# Patient Record
Sex: FEMALE | Race: WHITE | Employment: FULL TIME | URBAN - METROPOLITAN AREA
[De-identification: names, ages, dates, MRNs, and addresses within clinical notes are randomized per-mention and may not be internally consistent; named-entity substitution may affect disease eponyms.]

---

## 2017-08-30 PROBLEM — I35.8 AORTIC VALVE SCLEROSIS: Status: ACTIVE | Noted: 2017-08-30

## 2018-02-19 ENCOUNTER — TELEPHONE (OUTPATIENT)
Dept: CARDIOLOGY UNIT | Facility: HOSPITAL | Age: 63
End: 2018-02-19

## 2018-02-19 ENCOUNTER — HOSPITAL ENCOUNTER (OUTPATIENT)
Facility: HOSPITAL | Age: 63
Setting detail: OBSERVATION
Discharge: HOME | End: 2018-02-19
Attending: EMERGENCY MEDICINE | Admitting: HOSPITALIST
Payer: COMMERCIAL

## 2018-02-19 ENCOUNTER — APPOINTMENT (OUTPATIENT)
Dept: CV DIAGNOSTICS | Facility: HOSPITAL | Age: 63
End: 2018-02-19
Attending: HOSPITALIST
Payer: COMMERCIAL

## 2018-02-19 ENCOUNTER — APPOINTMENT (OUTPATIENT)
Dept: GENERAL RADIOLOGY | Facility: HOSPITAL | Age: 63
End: 2018-02-19
Attending: EMERGENCY MEDICINE
Payer: COMMERCIAL

## 2018-02-19 VITALS
WEIGHT: 189 LBS | OXYGEN SATURATION: 98 % | DIASTOLIC BLOOD PRESSURE: 85 MMHG | RESPIRATION RATE: 18 BRPM | TEMPERATURE: 98 F | HEART RATE: 67 BPM | HEIGHT: 63 IN | SYSTOLIC BLOOD PRESSURE: 143 MMHG | BODY MASS INDEX: 33.49 KG/M2

## 2018-02-19 DIAGNOSIS — R07.9 ACUTE CHEST PAIN: Primary | ICD-10-CM

## 2018-02-19 LAB
ALBUMIN SERPL-MCNC: 3.4 G/DL (ref 3.5–4.8)
ALP LIVER SERPL-CCNC: 88 U/L (ref 50–130)
ALT SERPL-CCNC: 24 U/L (ref 14–54)
AST SERPL-CCNC: 23 U/L (ref 15–41)
ATRIAL RATE: 80 BPM
BASOPHILS # BLD AUTO: 0.03 X10(3) UL (ref 0–0.1)
BASOPHILS NFR BLD AUTO: 0.4 %
BILIRUB SERPL-MCNC: 0.6 MG/DL (ref 0.1–2)
BUN BLD-MCNC: 16 MG/DL (ref 8–20)
CALCIUM BLD-MCNC: 8.9 MG/DL (ref 8.3–10.3)
CHLORIDE: 107 MMOL/L (ref 101–111)
CO2: 26 MMOL/L (ref 22–32)
CREAT BLD-MCNC: 0.98 MG/DL (ref 0.55–1.02)
D-DIMER: 0.29 UG/ML FEU (ref 0–0.49)
EOSINOPHIL # BLD AUTO: 0.25 X10(3) UL (ref 0–0.3)
EOSINOPHIL NFR BLD AUTO: 3.2 %
ERYTHROCYTE [DISTWIDTH] IN BLOOD BY AUTOMATED COUNT: 12.8 % (ref 11.5–16)
GLUCOSE BLD-MCNC: 112 MG/DL (ref 70–99)
HCT VFR BLD AUTO: 37.5 % (ref 34–50)
HGB BLD-MCNC: 12.7 G/DL (ref 12–16)
IMMATURE GRANULOCYTE COUNT: 0.02 X10(3) UL (ref 0–1)
IMMATURE GRANULOCYTE RATIO %: 0.3 %
LYMPHOCYTES # BLD AUTO: 2.18 X10(3) UL (ref 0.9–4)
LYMPHOCYTES NFR BLD AUTO: 27.8 %
M PROTEIN MFR SERPL ELPH: 7.3 G/DL (ref 6.1–8.3)
MCH RBC QN AUTO: 30.8 PG (ref 27–33.2)
MCHC RBC AUTO-ENTMCNC: 33.9 G/DL (ref 31–37)
MCV RBC AUTO: 90.8 FL (ref 81–100)
MONOCYTES # BLD AUTO: 0.85 X10(3) UL (ref 0.1–1)
MONOCYTES NFR BLD AUTO: 10.8 %
NEUTROPHIL ABS PRELIM: 4.51 X10 (3) UL (ref 1.3–6.7)
NEUTROPHILS # BLD AUTO: 4.51 X10(3) UL (ref 1.3–6.7)
NEUTROPHILS NFR BLD AUTO: 57.5 %
P AXIS: 64 DEGREES
P-R INTERVAL: 168 MS
PLATELET # BLD AUTO: 249 10(3)UL (ref 150–450)
POTASSIUM SERPL-SCNC: 3.6 MMOL/L (ref 3.6–5.1)
Q-T INTERVAL: 404 MS
QRS DURATION: 82 MS
QTC CALCULATION (BEZET): 465 MS
R AXIS: 19 DEGREES
RBC # BLD AUTO: 4.13 X10(6)UL (ref 3.8–5.1)
RED CELL DISTRIBUTION WIDTH-SD: 42.7 FL (ref 35.1–46.3)
SODIUM SERPL-SCNC: 140 MMOL/L (ref 136–144)
T AXIS: 42 DEGREES
TROPONIN: <0.046 NG/ML (ref ?–0.05)
VENTRICULAR RATE: 80 BPM
WBC # BLD AUTO: 7.8 X10(3) UL (ref 4–13)

## 2018-02-19 PROCEDURE — 84484 ASSAY OF TROPONIN QUANT: CPT | Performed by: EMERGENCY MEDICINE

## 2018-02-19 PROCEDURE — 99285 EMERGENCY DEPT VISIT HI MDM: CPT

## 2018-02-19 PROCEDURE — 84484 ASSAY OF TROPONIN QUANT: CPT | Performed by: HOSPITALIST

## 2018-02-19 PROCEDURE — 93018 CV STRESS TEST I&R ONLY: CPT | Performed by: HOSPITALIST

## 2018-02-19 PROCEDURE — 93017 CV STRESS TEST TRACING ONLY: CPT | Performed by: HOSPITALIST

## 2018-02-19 PROCEDURE — 36415 COLL VENOUS BLD VENIPUNCTURE: CPT

## 2018-02-19 PROCEDURE — 78452 HT MUSCLE IMAGE SPECT MULT: CPT | Performed by: HOSPITALIST

## 2018-02-19 PROCEDURE — 93010 ELECTROCARDIOGRAM REPORT: CPT

## 2018-02-19 PROCEDURE — 71045 X-RAY EXAM CHEST 1 VIEW: CPT | Performed by: EMERGENCY MEDICINE

## 2018-02-19 PROCEDURE — 85025 COMPLETE CBC W/AUTO DIFF WBC: CPT | Performed by: EMERGENCY MEDICINE

## 2018-02-19 PROCEDURE — 80053 COMPREHEN METABOLIC PANEL: CPT | Performed by: EMERGENCY MEDICINE

## 2018-02-19 PROCEDURE — 93005 ELECTROCARDIOGRAM TRACING: CPT

## 2018-02-19 PROCEDURE — 85378 FIBRIN DEGRADE SEMIQUANT: CPT | Performed by: EMERGENCY MEDICINE

## 2018-02-19 RX ORDER — LOTEPREDNOL ETABONATE 5 MG/ML
1 SUSPENSION/ DROPS OPHTHALMIC DAILY
COMMUNITY
End: 2019-07-10

## 2018-02-19 RX ORDER — SODIUM CHLORIDE 9 MG/ML
INJECTION, SOLUTION INTRAVENOUS CONTINUOUS
Status: DISCONTINUED | OUTPATIENT
Start: 2018-02-19 | End: 2018-02-19

## 2018-02-19 RX ORDER — ASPIRIN 81 MG/1
324 TABLET, CHEWABLE ORAL ONCE
Status: COMPLETED | OUTPATIENT
Start: 2018-02-19 | End: 2018-02-19

## 2018-02-19 RX ORDER — ONDANSETRON 2 MG/ML
4 INJECTION INTRAMUSCULAR; INTRAVENOUS EVERY 6 HOURS PRN
Status: DISCONTINUED | OUTPATIENT
Start: 2018-02-19 | End: 2018-02-19

## 2018-02-19 RX ORDER — POTASSIUM CHLORIDE 20 MEQ/1
40 TABLET, EXTENDED RELEASE ORAL EVERY 4 HOURS
Status: COMPLETED | OUTPATIENT
Start: 2018-02-19 | End: 2018-02-19

## 2018-02-19 RX ORDER — ACETAMINOPHEN 325 MG/1
650 TABLET ORAL EVERY 6 HOURS PRN
Status: DISCONTINUED | OUTPATIENT
Start: 2018-02-19 | End: 2018-02-19

## 2018-02-19 RX ORDER — ENOXAPARIN SODIUM 100 MG/ML
40 INJECTION SUBCUTANEOUS DAILY
Status: DISCONTINUED | OUTPATIENT
Start: 2018-02-19 | End: 2018-02-19

## 2018-02-19 NOTE — CONSULTS
[facility] Group Cardiology  Consultation Note      Génesismeaghan Ulloa Patient Status:  Observation    1955 MRN [id number]   [facility] 2NE-A Attending Ximena Barrera MD   Hosp Day # 0 PCP Donovan Schroeder MD     Outpatient cardi consultation was requested.     Medications:    Current Facility-Administered Medications:  0.9%  NaCl infusion  Intravenous Continuous   Enoxaparin Sodium (LOVENOX) 40 MG/0.4ML injection 40 mg 40 mg Subcutaneous Daily   acetaminophen (TYLENOL) tab 650 mg 6 Nausea and vomiting      Review of Systems:  Constitutional: negative for fevers  Eyes: negative for visual disturbance  Ears, nose, mouth, throat, and face: negative for epistaxis  Respiratory: negative for dyspnea on exertion  Cardiovascular: negative fo Results  Component Value Date   WBC 7.8 02/19/2018   HGB 12.7 02/19/2018   HCT 37.5 02/19/2018   .0 02/19/2018   CREATSERUM 0.98 02/19/2018   BUN 16 02/19/2018    02/19/2018   K 3.6 02/19/2018    02/19/2018   CO2 26.0 02/19/2018   GLU 11

## 2018-02-19 NOTE — ED INITIAL ASSESSMENT (HPI)
Pt presents with dizziness that makes her think she's about to pass out. (Not a vertigo dizziness, she reports.) There's also an intermittent \"throbbing\" chest pain that began at 2330 tonight. Tenderness to palpation of her chest wall, the left side.

## 2018-02-19 NOTE — ED PROVIDER NOTES
Patient Seen in: [facility] Emergency Department    History   Patient presents with:  Dizziness (neurologic)  Pain (neurologic)    Stated Complaint: shoulder pain/dizzy    HPI    Patient is a 23-year-old female comes emergency room for evaluation of Types: Cigarettes     Quit date: 5/27/1974  Smokeless tobacco: Never Used                      Alcohol use: Yes           1.2 oz/week     Glasses of wine: 2 per week     Comment: occasional      Review of Systems    Positive for stated complaint: aguila Normal    Narrative:     FEU = Fibrinogen Equivalent Units.     D-Dimer results of less than 0.5 ug/mL (FEU) have been shown to contribute to the exclusion of venous thromboembolism with a negative predictive value of approximately 95% when results are used am    Follow-up:  No follow-up provider specified.       Medications Prescribed:  Current Discharge Medication List        Present on Admission  Date Reviewed: 8/30/2017          ICD-10-CM Noted POA    Acute chest pain R07.9 2/19/2018 Unknown

## 2018-02-19 NOTE — H&P
General Medicine H&P     Patient presents with:  Dizziness (neurologic)  Pain (neurologic)       PCP: Jany Vázquez MD    Attempted to see pt earlier, was at stress    History of Present Illness: Patient is a 60year old female with PMH sig for lyme, History   Problem Relation Age of Onset   • Heart Disorder Father    • Depression Sister    • Kidney Disease Sister    • Heart Disorder Maternal Grandfather    • Heart Disorder Paternal Grandfather    • Breast Cancer Paternal Aunt 79   • Dementia Mother PLAN:    60year old female with PMH sig for lyme, OA, PTSD, who p/t [facility] ED c cp.     CP  -may be MSK, tenderness to palpitation, possible pericarditis  +fam hx, stress test done, returned negative  -CMP, CBC, trop, d-dimer CXR all ok  -pt had near fainting

## 2018-02-19 NOTE — PAYOR COMM NOTE
--------------  ADMISSION REVIEW     Payor: [facility] PPO  Subscriber #:  [id number]  Authorization Number: N/A    Admit date: N/A  Admit time: N/A       Admitting Physician: Leora Pisano MD  Attending Physician:  Leora Pisano MD  Primary Care P Past Surgical History:  No date: CARPAL TUNNEL RELEASE  No date: FOOT SURGERY  No date: [facility] NEEDLE LOCALIZATION W/ SPECIMEN 1 SITE LEFT      Comment: 1990's bn  1994: OTHER  No date: OTHER SURGICAL HISTORY      Comment: A RCR RIGHT SHOULDER   No date groups.   normal sensation, speech intact    ED Course     Labs Reviewed   COMP METABOLIC PANEL (14) - Abnormal; Notable for the following:        Result Value    Glucose 112 (*)     Albumin 3.4 (*)     All other components within normal limits   TROPONIN I complaints or concerns. Patient will be admitted to the hospital for further workup. Admission disposition: 2/19/2018  4:37 AM           Disposition and Plan     Clinical Impression:  Acute chest pain  (primary encounter diagnosis)    Disposition:   Ad

## 2018-02-20 NOTE — PLAN OF CARE
NURSING DISCHARGE NOTE    Discharged to home. Accompanied by RN. Belongings sent. DC instructions given, pt verbalizes understanding.

## 2018-05-15 PROCEDURE — 87077 CULTURE AEROBIC IDENTIFY: CPT | Performed by: INTERNAL MEDICINE

## 2018-05-15 PROCEDURE — 87046 STOOL CULTR AEROBIC BACT EA: CPT | Performed by: INTERNAL MEDICINE

## 2018-05-15 PROCEDURE — 89055 LEUKOCYTE ASSESSMENT FECAL: CPT | Performed by: INTERNAL MEDICINE

## 2018-05-15 PROCEDURE — 87209 SMEAR COMPLEX STAIN: CPT | Performed by: INTERNAL MEDICINE

## 2018-05-15 PROCEDURE — 87045 FECES CULTURE AEROBIC BACT: CPT | Performed by: INTERNAL MEDICINE

## 2018-05-15 PROCEDURE — 87329 GIARDIA AG IA: CPT | Performed by: INTERNAL MEDICINE

## 2018-05-15 PROCEDURE — 87177 OVA AND PARASITES SMEARS: CPT | Performed by: INTERNAL MEDICINE

## 2018-05-15 PROCEDURE — 87493 C DIFF AMPLIFIED PROBE: CPT | Performed by: INTERNAL MEDICINE

## 2018-05-15 PROCEDURE — 87272 CRYPTOSPORIDIUM AG IF: CPT | Performed by: INTERNAL MEDICINE

## 2019-07-15 PROBLEM — M18.12 PRIMARY OSTEOARTHRITIS OF FIRST CARPOMETACARPAL JOINT OF LEFT HAND: Status: ACTIVE | Noted: 2019-07-15

## 2019-08-12 PROBLEM — M67.432 GANGLION CYST OF VOLAR ASPECT OF LEFT WRIST: Status: ACTIVE | Noted: 2019-08-12

## 2019-08-12 PROBLEM — M19.032 ARTHRITIS OF LEFT WRIST: Status: ACTIVE | Noted: 2019-08-12

## 2019-10-17 PROBLEM — R42 VERTIGO: Status: ACTIVE | Noted: 2019-10-17

## 2020-08-31 PROBLEM — M67.432 GANGLION CYST OF VOLAR ASPECT OF LEFT WRIST: Status: RESOLVED | Noted: 2019-08-12 | Resolved: 2020-08-31

## 2020-08-31 PROBLEM — M19.032 ARTHRITIS OF LEFT WRIST: Status: RESOLVED | Noted: 2019-08-12 | Resolved: 2020-08-31

## 2020-08-31 PROBLEM — M18.12 PRIMARY OSTEOARTHRITIS OF FIRST CARPOMETACARPAL JOINT OF LEFT HAND: Status: RESOLVED | Noted: 2019-07-15 | Resolved: 2020-08-31

## 2020-09-03 ENCOUNTER — OFFICE VISIT (OUTPATIENT)
Dept: OBGYN CLINIC | Facility: CLINIC | Age: 65
End: 2020-09-03
Payer: MEDICARE

## 2020-09-03 VITALS
BODY MASS INDEX: 33 KG/M2 | WEIGHT: 184 LBS | HEART RATE: 71 BPM | DIASTOLIC BLOOD PRESSURE: 89 MMHG | SYSTOLIC BLOOD PRESSURE: 149 MMHG

## 2020-09-03 DIAGNOSIS — N88.8 SINGLE NABOTHIAN CYST: Primary | ICD-10-CM

## 2020-09-03 PROCEDURE — 99203 OFFICE O/P NEW LOW 30 MIN: CPT | Performed by: OBSTETRICS & GYNECOLOGY

## 2020-09-03 RX ORDER — POLYETHYLENE GLYCOL-3350 AND ELECTROLYTES 236; 6.74; 5.86; 2.97; 22.74 G/274.31G; G/274.31G; G/274.31G; G/274.31G; G/274.31G
POWDER, FOR SOLUTION ORAL
COMMUNITY
Start: 2020-09-02 | End: 2020-10-08

## 2020-09-03 NOTE — PROGRESS NOTES
Farzana NASH 1955       Patient presents with:  Gyn Problem: large polyp not sure where. .pap was done on the 2020 neg hpv neg  pt saw her pcp for a recent pap and there was a polyp seen on her cervix.   Her cotest at that time was normal. problem.     Diagnoses and all orders for this visit:    Single nabothian cyst    rtc in 1 year for annual exam either with me or with her pcp

## 2020-09-10 PROBLEM — M85.80 OSTEOPENIA, UNSPECIFIED LOCATION: Status: ACTIVE | Noted: 2020-09-10

## 2020-09-10 PROBLEM — E78.00 HIGH CHOLESTEROL: Status: ACTIVE | Noted: 2020-09-10

## 2020-09-10 PROBLEM — Z01.411 ABNORMAL PELVIC EXAM: Status: ACTIVE | Noted: 2020-09-10

## 2020-09-10 PROBLEM — L65.9 HAIR LOSS: Status: ACTIVE | Noted: 2020-09-10

## 2020-09-29 PROBLEM — M65.341 TRIGGER RING FINGER OF RIGHT HAND: Status: ACTIVE | Noted: 2020-09-29

## 2020-11-13 PROBLEM — Z01.411 ABNORMAL PELVIC EXAM: Status: RESOLVED | Noted: 2020-09-10 | Resolved: 2020-11-13

## 2023-06-20 ENCOUNTER — TELEPHONE (OUTPATIENT)
Dept: GASTROENTEROLOGY | Facility: CLINIC | Age: 68
End: 2023-06-20

## 2023-06-20 ENCOUNTER — OFFICE VISIT (OUTPATIENT)
Dept: GASTROENTEROLOGY | Facility: CLINIC | Age: 68
End: 2023-06-20
Payer: MEDICARE

## 2023-06-20 VITALS
BODY MASS INDEX: 32.6 KG/M2 | HEART RATE: 62 BPM | SYSTOLIC BLOOD PRESSURE: 157 MMHG | WEIGHT: 184 LBS | DIASTOLIC BLOOD PRESSURE: 84 MMHG | HEIGHT: 63 IN

## 2023-06-20 DIAGNOSIS — R10.12 LUQ PAIN: Primary | ICD-10-CM

## 2023-06-20 DIAGNOSIS — Z12.11 COLON CANCER SCREENING: Primary | ICD-10-CM

## 2023-06-20 PROCEDURE — 99203 OFFICE O/P NEW LOW 30 MIN: CPT | Performed by: INTERNAL MEDICINE

## 2023-06-20 RX ORDER — LOTEPREDNOL ETABONATE 5 MG/ML
1 SUSPENSION/ DROPS OPHTHALMIC 4 TIMES DAILY
COMMUNITY
Start: 2023-06-06

## 2023-06-20 RX ORDER — APIXABAN 5 MG/1
5 TABLET, FILM COATED ORAL 2 TIMES DAILY
COMMUNITY
Start: 2023-05-23

## 2023-06-20 RX ORDER — METOPROLOL SUCCINATE 50 MG/1
50 TABLET, EXTENDED RELEASE ORAL 2 TIMES DAILY
Qty: 60 TABLET | Refills: 11 | COMMUNITY
Start: 2022-08-18 | End: 2023-07-19

## 2023-06-20 NOTE — PATIENT INSTRUCTIONS
Colonoscopy for colon cancer screening  - Golytely and MAC sedation at the hospital GI lab  - get cardiac clearance and ok for holding anticoag 2 days before procedure    LUQ pain  - call to set up CT scan

## 2023-06-26 NOTE — TELEPHONE ENCOUNTER
ReScheduled for:  Colonoscopy [id number] , [address] [facility]   Provider Name:    Date: 12/08/2023  Location:  [facility]   Sedation:  Mac   Time: 11:15am (pt is aware to arrive at 10:15am)   Prep: Colyte  Prep instructions were given to pt in the office, I discuss prep Instructions with patient at the time of the appointment which she verbally understood and given the prep instructions at the time of the appointment  Meds/Allergies Reconciled?:  Physician reviewed      Diagnosis with codes:  Colon cancer screening Z12.11  Was patient informed to call insurance with codes (Y/N):  Yes, I confirmed MEDICARE  insurance with the patient. The patient also verbally understands to call her insurance to check for pre-cert, codes were given on prep instructions. Referral sent?:  Referral was sent at the time of electronic surgical scheduling. [facility] or [facility] notified?:  I sent an electronic request to Endo Scheduling and received a confirmation today. Medication Orders: This patient verbally confirmed that she is  taking:               Iron, blood thinners- Eliquis to hold x2 days prior to procedure , BP meds, and is not diabetic  Patient was inform to get cardiac clearance and ok for holding anticoag 2 days before procedure,to call  [facility] office at [phone] ,which patient will be seeing on July 5th for a follow up . Not latex allergy, Not PCN allergy and does not have a pacemaker Pt is aware to NOT take iron pills, herbal meds and diet supplements for 7 days before exam. Also to NOT take any form of alcohol, recreational drugs and any forms of ED meds 24 hours before exam.    Misc Orders:  Patient verbally understood & will await a phone call from [facility] to schedule. Further instructions given by staff: I discussed the prep instructions with the patient which she verbally understood and is aware that I will send the instructions today. via [employer]

## 2023-06-27 ENCOUNTER — HOSPITAL ENCOUNTER (OUTPATIENT)
Dept: CT IMAGING | Facility: HOSPITAL | Age: 68
Discharge: HOME | End: 2023-06-27
Attending: INTERNAL MEDICINE
Payer: MEDICARE

## 2023-06-27 DIAGNOSIS — R10.12 LUQ PAIN: ICD-10-CM

## 2023-06-27 LAB
CREAT BLD-MCNC: 0.9 MG/DL
GFR SERPLBLD BASED ON 1.73 SQ M-ARVRAT: 70 ML/MIN/1.73M2 (ref 60–?)

## 2023-06-27 PROCEDURE — 74177 CT ABD & PELVIS W/CONTRAST: CPT | Performed by: INTERNAL MEDICINE

## 2023-06-27 PROCEDURE — 82565 ASSAY OF CREATININE: CPT

## 2023-06-28 ENCOUNTER — TELEPHONE (OUTPATIENT)
Facility: CLINIC | Age: 68
End: 2023-06-28

## 2023-06-29 ENCOUNTER — TELEPHONE (OUTPATIENT)
Facility: CLINIC | Age: 68
End: 2023-06-29

## 2023-08-04 RX ORDER — PREDNISONE 10 MG/1
10 TABLET ORAL DAILY
COMMUNITY

## 2023-08-11 ENCOUNTER — HOSPITAL ENCOUNTER (OUTPATIENT)
Dept: INTERVENTIONAL RADIOLOGY/VASCULAR | Facility: HOSPITAL | Age: 68
Discharge: HOME | End: 2023-08-11
Attending: INTERNAL MEDICINE | Admitting: INTERNAL MEDICINE
Payer: MEDICARE

## 2023-08-11 VITALS
SYSTOLIC BLOOD PRESSURE: 158 MMHG | HEART RATE: 64 BPM | TEMPERATURE: 98 F | BODY MASS INDEX: 30.83 KG/M2 | DIASTOLIC BLOOD PRESSURE: 81 MMHG | RESPIRATION RATE: 19 BRPM | WEIGHT: 174 LBS | OXYGEN SATURATION: 97 % | HEIGHT: 63 IN

## 2023-08-11 DIAGNOSIS — R94.39 ABNORMAL STRESS TEST: ICD-10-CM

## 2023-08-11 LAB
ISTAT ACTIVATED CLOTTING TIME: 251 SECONDS (ref 125–137)
ISTAT ACTIVATED CLOTTING TIME: 257 SECONDS (ref 125–137)

## 2023-08-11 PROCEDURE — 93010 ELECTROCARDIOGRAM REPORT: CPT | Performed by: INTERNAL MEDICINE

## 2023-08-11 PROCEDURE — 85347 COAGULATION TIME ACTIVATED: CPT

## 2023-08-11 PROCEDURE — 93454 CORONARY ARTERY ANGIO S&I: CPT | Performed by: INTERNAL MEDICINE

## 2023-08-11 PROCEDURE — 93005 ELECTROCARDIOGRAM TRACING: CPT

## 2023-08-11 PROCEDURE — 027034Z DILATION OF CORONARY ARTERY, ONE ARTERY WITH DRUG-ELUTING INTRALUMINAL DEVICE, PERCUTANEOUS APPROACH: ICD-10-PCS | Performed by: INTERNAL MEDICINE

## 2023-08-11 PROCEDURE — B240ZZ3 ULTRASONOGRAPHY OF SINGLE CORONARY ARTERY, INTRAVASCULAR: ICD-10-PCS | Performed by: INTERNAL MEDICINE

## 2023-08-11 PROCEDURE — 92978 ENDOLUMINL IVUS OCT C 1ST: CPT | Performed by: INTERNAL MEDICINE

## 2023-08-11 PROCEDURE — 36415 COLL VENOUS BLD VENIPUNCTURE: CPT

## 2023-08-11 PROCEDURE — B2111ZZ FLUOROSCOPY OF MULTIPLE CORONARY ARTERIES USING LOW OSMOLAR CONTRAST: ICD-10-PCS | Performed by: INTERNAL MEDICINE

## 2023-08-11 RX ORDER — HEPARIN SODIUM 1000 [USP'U]/ML
INJECTION, SOLUTION INTRAVENOUS; SUBCUTANEOUS
Status: COMPLETED
Start: 2023-08-11 | End: 2023-08-11

## 2023-08-11 RX ORDER — ASPIRIN 81 MG/1
81 TABLET ORAL DAILY
Status: DISCONTINUED | OUTPATIENT
Start: 2023-08-12 | End: 2023-08-11

## 2023-08-11 RX ORDER — ONDANSETRON 2 MG/ML
INJECTION INTRAMUSCULAR; INTRAVENOUS
Status: COMPLETED
Start: 2023-08-11 | End: 2023-08-11

## 2023-08-11 RX ORDER — NITROGLYCERIN 0.4 MG/1
TABLET SUBLINGUAL
Status: COMPLETED
Start: 2023-08-11 | End: 2023-08-11

## 2023-08-11 RX ORDER — VERAPAMIL HYDROCHLORIDE 2.5 MG/ML
INJECTION, SOLUTION INTRAVENOUS
Status: COMPLETED
Start: 2023-08-11 | End: 2023-08-11

## 2023-08-11 RX ORDER — HYDRALAZINE HYDROCHLORIDE 20 MG/ML
10 INJECTION INTRAMUSCULAR; INTRAVENOUS ONCE
Status: COMPLETED | OUTPATIENT
Start: 2023-08-11 | End: 2023-08-11

## 2023-08-11 RX ORDER — SODIUM CHLORIDE 9 MG/ML
3 INJECTION, SOLUTION INTRAVENOUS ONCE
Status: COMPLETED | OUTPATIENT
Start: 2023-08-11 | End: 2023-08-11

## 2023-08-11 RX ORDER — SODIUM CHLORIDE 9 MG/ML
INJECTION, SOLUTION INTRAVENOUS CONTINUOUS
Status: DISCONTINUED | OUTPATIENT
Start: 2023-08-11 | End: 2023-08-11

## 2023-08-11 RX ORDER — LIDOCAINE HYDROCHLORIDE 20 MG/ML
INJECTION, SOLUTION EPIDURAL; INFILTRATION; INTRACAUDAL; PERINEURAL
Status: COMPLETED
Start: 2023-08-11 | End: 2023-08-11

## 2023-08-11 RX ORDER — PRASUGREL 10 MG/1
TABLET, FILM COATED ORAL
Status: COMPLETED
Start: 2023-08-11 | End: 2023-08-11

## 2023-08-11 RX ORDER — HYDRALAZINE HYDROCHLORIDE 20 MG/ML
10 INJECTION INTRAMUSCULAR; INTRAVENOUS ONCE
Status: DISCONTINUED | OUTPATIENT
Start: 2023-08-11 | End: 2023-08-11

## 2023-08-11 RX ORDER — PRASUGREL 10 MG/1
10 TABLET, FILM COATED ORAL DAILY
Status: DISCONTINUED | OUTPATIENT
Start: 2023-08-12 | End: 2023-08-11

## 2023-08-11 RX ORDER — MIDAZOLAM HYDROCHLORIDE 1 MG/ML
INJECTION INTRAMUSCULAR; INTRAVENOUS
Status: COMPLETED
Start: 2023-08-11 | End: 2023-08-11

## 2023-08-11 RX ORDER — HYDRALAZINE HYDROCHLORIDE 20 MG/ML
INJECTION INTRAMUSCULAR; INTRAVENOUS
Status: COMPLETED
Start: 2023-08-11 | End: 2023-08-11

## 2023-08-11 RX ORDER — PRASUGREL 10 MG/1
10 TABLET, FILM COATED ORAL DAILY
Qty: 90 TABLET | Refills: 3 | Status: SHIPPED | OUTPATIENT
Start: 2023-08-12

## 2023-08-11 RX ORDER — ASPIRIN 81 MG/1
324 TABLET, CHEWABLE ORAL ONCE
Status: DISCONTINUED | OUTPATIENT
Start: 2023-08-11 | End: 2023-08-11

## 2023-08-11 RX ORDER — NITROGLYCERIN 20 MG/100ML
INJECTION INTRAVENOUS
Status: COMPLETED
Start: 2023-08-11 | End: 2023-08-11

## 2023-08-11 RX ADMIN — SODIUM CHLORIDE: 9 INJECTION, SOLUTION INTRAVENOUS at 15:00:00

## 2023-08-11 RX ADMIN — SODIUM CHLORIDE 3 ML/KG/HR: 9 INJECTION, SOLUTION INTRAVENOUS at 10:00:00

## 2023-08-11 RX ADMIN — HYDRALAZINE HYDROCHLORIDE 10 MG: 20 INJECTION INTRAMUSCULAR; INTRAVENOUS at 13:57:00

## 2023-08-11 NOTE — PROCEDURES
[facility]    Cardiac Cath Procedure Note  Erik Munguia Patient Status:  Outpatient    1955 MRN [id number]   Location [facility] Attending Lisa Hussein, [address] Day # 0 PCP Ovidio Bernard MD       Cardiologist: Darvin Medina MD  Primary Proceduralist: Darvin eMdina MD  Procedure Performed: LHC, COR, LV, and IVUS guided PCI LAD  Date of Procedure: 2023   Indication: Abnormal stress test    Summary of procedure: Successful IVUS guided PCI of proximal to mid LAD due to long 95% stenosis. Recommendations:  History of atrial fibrillation on Eliquis, will perform outpatient monitor to assess A-fib burden to ensure that she needs the Eliquis, has been in sinus rhythm during my evaluation. Antiplatelet therapy: We will do Effient due to long LAD stented segment, no aspirin. Restarting Eliquis tomorrow. Left Ventriculography and hemodynamics:   LV EF not done  LV EDP 12 mmHg  No gradient across aortic valve        Coronary Angiography  RCA: Nondominant RCA, supplies significant marginal branch of the RV is nonobstructive    Left main:  Free of obstructive disease    Left anterior descending: Proximal vessel stenosis, angiographically appears to be a healed dissection to the mid segment, long 95% stenosis. Remainder the LAD is free of obstructive disease, supplies multiple diagonals which are non-obstructive    Circumflex: Dominant circumflex. Free of obstructive disease, supplies multiple OM branches which are patent      LAD intervention  Lesion Characteristics-severely torturous, moderately calcified. Type non-C lesion. Pre-intervention stenosis 95%, Post intervention stenosis 0%.   Pre MONICA 3, Post MONICA 3.      Guide Catheter: EBU 3.5  Wire: Yolanda blue  Intravascular ultrasound demonstrates atherosclerotic plaque throughout the entirety of the vessel, possible ulcerations and micro dissections however does not appear to be healed spontaneous coronary artery dissection lesion. Pre-dil: 2.5 x 20 mm balloon, used to deliver guide extension to the proximal LAD  Stent: 2.5 x 38 mm Synergy DEANN from proximal to mid LAD  Post-dil: 3 x 20 mm NC balloon mid and proximal portion of the stent  Intravascular ultrasound demonstrates distal edge plaque shift with dissection, confirmed on angiogram as well. Also mildly under sized proximal portion of the stent. Post dilation: 3.5 x 12 mm compliant balloon at 16 liam proximal portion of the stent  Stent: 3 x 20 mm Synergy DEANN mid LAD  Post dilation: 3 mm stent balloon throughout the entirety of stented segment        Summary of Case: After written informed consent was obtained from the patient, patient was brought to the cardiac catheterization laboratory. Patient was prepped and draped in the usual sterile fashion. Lidocaine 1% was used to infiltrate the right radial artery for local anesthesia and a 6 Kazakh introducer sheath was inserted into the right radial artery. Selective coronary angiography performed with JR4 catheter for RCA and JL3.5 catheter for LCA. Angiography performed in standard projections. 6 [place] Paula Lauth catheter placed in LV for hemodynamics. Specimen sent to: No specimen collected  Estimated blood loss: 10 cc  Closure:  TR band      IV was maintained by RN and moderate conscious sedation of versed and fentanyl was given. Patient was assessed and monitoring of oxygen, heart rate and blood pressure by nurse and myself during the exam 3 minutes.       Nina Dias MD  08/11/23

## 2023-08-11 NOTE — INTERVAL H&P NOTE
Pre-op Diagnosis: * No pre-op diagnosis entered *    The above referenced H&P was reviewed by Adan Elizabeth MD on 8/11/2023, the patient was examined and no significant changes have occurred in the patient's condition since the H&P was performed. I discussed with the patient and/or legal representative the potential benefits, risks and side effects of this procedure; the likelihood of the patient achieving goals; and potential problems that might occur during recuperation. I discussed reasonable alternatives to the procedure, including risks, benefits and side effects related to the alternatives and risks related to not receiving this procedure. We will proceed with procedure as planned.

## 2023-08-11 NOTE — DISCHARGE INSTRUCTIONS
TRANSRADIAL DISCHARGE INSTRUCTIONS AND HOME CARE FOLLOWING CORONARY ANGIOGRAPHY,  INSERTION OF STENT IN THE CORONARY    Activity    DO NOT drive after the procedure. You may resume driving late the following day according to the nurse or physician's instructions  Plan on resting and relaxing tonight and tomorrow  Resume your normal activity after 48 hours, or as instructed by your physician  Avoid drinking alcohol for the next 24 hours  Avoid wrist flexion, extension, and fine motor activities (i.e. texting, typing, using computer mouse, etc.) for 24 hours  Do not lift or pull anything heavier than 5 to 8  pounds with affected hand for 1 week    What is Normal?    A small lump at the procedure site associated with mild tenderness when touched  The procedure site may be bruised or discolored  There may be a small amount of drainage on the bandage    Special Instructions     Drink plenty of fluids during the next 24 hours to \"flush\" the contrast from your system  Keep the bandage clean and dry  After 24 hours, you must remove the bandage. Do not put ointment, powders, or creams to site.   You can shower after removing the bandage, and wash the procedure site gently with soap and water  DO NOT submerge the procedure site for 1 week (no bath tubs or pools)  If you choose to wear a bandage for a few days, make sure it remains clean and dry and that it is changed daily  For local swelling: apply ice  Bleeding can occur at the procedure site - both on the outside of the skin and/or beneath the surface of the skin        If bleeding occurs: Elevate hand above heart and apply local pressure  If the bleeding does not stop after 20 minutes, call 911 and continue to apply pressure  Swelling or a large lump at the procedure site can occur, which may be accompanied by moderate to severe pain    Additional Instructions:  Do not take glucophage/metformin containing products for at least 48 hours after procedure, unless otherwise directed by your physician. When to contact physician: Call Dr. Lang Maldonado at [phone] right away if you experience     Swelling, pain, or bleeding at the site that is not relieved by applying ice or pressure  Signs of infection: Redness, warmth, drainage at the site, chills, or temperature of 100.5 or greater  Changes in sensation, numbness, or tingling of affected hand    You Received a Stent:    You will remain on an antiplatelet drug and/or aspirin. Antiplatelet medications are usually taken for six months to one year and should not be stopped unless your cardiologist directs you to do so. These medications help to prevent blockage at the stent site. If another physician or dentist asks you to stop your antiplatelet medication, you need to consult your cardiologist first.  Together, your cardiologist and other physician can discuss the risks that may be involved if you are not taking the antiplatelet medication   If an MRI is necessary, it may be done 4-6 weeks after your procedure. Verify this with your cardiologist  Keep your stent card with you at all times! If you need an MRI in the future, your stent card will need to be shown to the technologist before performing the MRI. A duplicate card CANNOT be reproduced. Other    You may resume your present diet, unless otherwise specified by your physician. You may resume all of your medications as prescribed, unless otherwise directed by your physician. A list of your medications was provided to you at discharge. Gradually resume your previous aerobic exercise schedule as directed by your physician.       If you have any question or concern, please call the on-call nurse at [phone]

## 2023-08-11 NOTE — IVS NOTE
DISCHARGE NOTE     Pt is able to sit up and ambulate without difficulty. Pt voided and tolerated fluids and food. Right radial procedural site remains dry and intact with good circulation, motion and sensation. Armboard in place. No signs or symptoms of bleeding/hematoma noted. Pt denies any pain or discomfort at this time. IV access removed  Instruction provided, patient/family verbalizes understanding. Dr. Donna Carney spoke with patient/family post procedure. Cardiac rehab booklet reviewed with patient as well as cardiac diet handout. Left a message with cardiac rehab to follow-up with patient post-discharge.      Pt discharge via wheelchair to [address]      Follow up Appointment: Thursday, August 17th at 10:30am with Jose A William Prescription: Effient - confirmed with patient's pharmacy, [facility], prescription was received, will be available for pickup today and will be no cost
no

## 2023-08-12 LAB
ATRIAL RATE: 64 BPM
P AXIS: 56 DEGREES
P-R INTERVAL: 136 MS
Q-T INTERVAL: 420 MS
QRS DURATION: 80 MS
QTC CALCULATION (BEZET): 433 MS
R AXIS: 51 DEGREES
T AXIS: 60 DEGREES
VENTRICULAR RATE: 64 BPM

## 2023-08-28 ENCOUNTER — ORDER TRANSCRIPTION (OUTPATIENT)
Dept: CARDIAC REHAB | Facility: HOSPITAL | Age: 68
End: 2023-08-28

## 2023-08-28 DIAGNOSIS — Z95.5 STENTED CORONARY ARTERY: Primary | ICD-10-CM

## 2023-08-31 ENCOUNTER — CARDPULM VISIT (OUTPATIENT)
Dept: CARDIAC REHAB | Facility: HOSPITAL | Age: 68
End: 2023-08-31
Attending: INTERNAL MEDICINE
Payer: MEDICARE

## 2023-08-31 VITALS — BODY MASS INDEX: 32.76 KG/M2 | WEIGHT: 178 LBS | HEIGHT: 62 IN

## 2023-09-08 ENCOUNTER — CARDPULM VISIT (OUTPATIENT)
Dept: CARDIAC REHAB | Facility: HOSPITAL | Age: 68
End: 2023-09-08
Attending: INTERNAL MEDICINE
Payer: MEDICARE

## 2023-09-08 PROCEDURE — 93798 PHYS/QHP OP CAR RHAB W/ECG: CPT

## 2023-09-11 ENCOUNTER — CARDPULM VISIT (OUTPATIENT)
Dept: CARDIAC REHAB | Facility: HOSPITAL | Age: 68
End: 2023-09-11
Attending: INTERNAL MEDICINE
Payer: MEDICARE

## 2023-09-11 PROCEDURE — 93798 PHYS/QHP OP CAR RHAB W/ECG: CPT

## 2023-09-12 ENCOUNTER — APPOINTMENT (OUTPATIENT)
Dept: CARDIAC REHAB | Facility: HOSPITAL | Age: 68
End: 2023-09-12
Attending: INTERNAL MEDICINE
Payer: MEDICARE

## 2023-09-13 ENCOUNTER — CARDPULM VISIT (OUTPATIENT)
Dept: CARDIAC REHAB | Facility: HOSPITAL | Age: 68
End: 2023-09-13
Attending: INTERNAL MEDICINE
Payer: MEDICARE

## 2023-09-13 PROCEDURE — 93798 PHYS/QHP OP CAR RHAB W/ECG: CPT

## 2023-09-15 ENCOUNTER — CARDPULM VISIT (OUTPATIENT)
Dept: CARDIAC REHAB | Facility: HOSPITAL | Age: 68
End: 2023-09-15
Attending: INTERNAL MEDICINE
Payer: MEDICARE

## 2023-09-15 PROCEDURE — 93798 PHYS/QHP OP CAR RHAB W/ECG: CPT

## 2023-09-18 ENCOUNTER — CARDPULM VISIT (OUTPATIENT)
Dept: CARDIAC REHAB | Facility: HOSPITAL | Age: 68
End: 2023-09-18
Attending: INTERNAL MEDICINE
Payer: MEDICARE

## 2023-09-18 ENCOUNTER — TELEPHONE (OUTPATIENT)
Facility: CLINIC | Age: 68
End: 2023-09-18

## 2023-09-18 DIAGNOSIS — Z12.11 SCREENING FOR COLON CANCER: Primary | ICD-10-CM

## 2023-09-18 PROCEDURE — 93798 PHYS/QHP OP CAR RHAB W/ECG: CPT

## 2023-09-20 ENCOUNTER — CARDPULM VISIT (OUTPATIENT)
Dept: CARDIAC REHAB | Facility: HOSPITAL | Age: 68
End: 2023-09-20
Attending: INTERNAL MEDICINE
Payer: MEDICARE

## 2023-09-20 PROCEDURE — 93798 PHYS/QHP OP CAR RHAB W/ECG: CPT

## 2023-09-21 ENCOUNTER — OFFICE VISIT (OUTPATIENT)
Dept: FAMILY MEDICINE CLINIC | Facility: CLINIC | Age: 68
End: 2023-09-21
Payer: MEDICARE

## 2023-09-21 VITALS
WEIGHT: 182.13 LBS | HEART RATE: 69 BPM | OXYGEN SATURATION: 98 % | RESPIRATION RATE: 16 BRPM | TEMPERATURE: 98 F | DIASTOLIC BLOOD PRESSURE: 78 MMHG | BODY MASS INDEX: 32.27 KG/M2 | SYSTOLIC BLOOD PRESSURE: 124 MMHG | HEIGHT: 63 IN

## 2023-09-21 DIAGNOSIS — R39.9 UTI SYMPTOMS: Primary | ICD-10-CM

## 2023-09-21 LAB
APPEARANCE: CLEAR
BILIRUBIN: NEGATIVE
GLUCOSE (URINE DIPSTICK): NEGATIVE MG/DL
KETONES (URINE DIPSTICK): NEGATIVE MG/DL
MULTISTIX LOT#: ABNORMAL NUMERIC
NITRITE, URINE: NEGATIVE
PH, URINE: 7 (ref 4.5–8)
PROTEIN (URINE DIPSTICK): NEGATIVE MG/DL
SPECIFIC GRAVITY: 1.02 (ref 1–1.03)
URINE-COLOR: YELLOW
UROBILINOGEN,SEMI-QN: 0.2 MG/DL (ref 0–1.9)

## 2023-09-21 PROCEDURE — 87086 URINE CULTURE/COLONY COUNT: CPT | Performed by: NURSE PRACTITIONER

## 2023-09-21 PROCEDURE — 87088 URINE BACTERIA CULTURE: CPT | Performed by: NURSE PRACTITIONER

## 2023-09-21 PROCEDURE — 81003 URINALYSIS AUTO W/O SCOPE: CPT | Performed by: NURSE PRACTITIONER

## 2023-09-21 PROCEDURE — 87186 SC STD MICRODIL/AGAR DIL: CPT | Performed by: NURSE PRACTITIONER

## 2023-09-21 PROCEDURE — 99202 OFFICE O/P NEW SF 15 MIN: CPT | Performed by: NURSE PRACTITIONER

## 2023-09-21 RX ORDER — NITROFURANTOIN 25; 75 MG/1; MG/1
100 CAPSULE ORAL 2 TIMES DAILY
Qty: 14 CAPSULE | Refills: 0 | Status: CANCELLED | OUTPATIENT
Start: 2023-09-21

## 2023-09-21 RX ORDER — ASPIRIN 81 MG/1
TABLET ORAL
COMMUNITY
Start: 2023-09-06

## 2023-09-21 RX ORDER — METFORMIN HYDROCHLORIDE 750 MG/1
750 TABLET, EXTENDED RELEASE ORAL
COMMUNITY
Start: 2023-08-21

## 2023-09-21 RX ORDER — NITROFURANTOIN 25; 75 MG/1; MG/1
100 CAPSULE ORAL 2 TIMES DAILY
Qty: 14 CAPSULE | Refills: 0 | Status: SHIPPED | OUTPATIENT
Start: 2023-09-21 | End: 2023-09-28

## 2023-09-21 RX ORDER — ROSUVASTATIN CALCIUM 20 MG/1
20 TABLET, COATED ORAL DAILY
COMMUNITY
Start: 2023-09-06

## 2023-09-21 RX ORDER — METOPROLOL SUCCINATE 50 MG/1
TABLET, EXTENDED RELEASE ORAL
COMMUNITY
Start: 2023-07-11

## 2023-09-21 RX ORDER — PREDNISONE 5 MG/1
5 TABLET ORAL DAILY
COMMUNITY
Start: 2023-08-25

## 2023-09-21 RX ORDER — PREDNISONE 1 MG/1
TABLET ORAL
COMMUNITY
Start: 2023-09-20

## 2023-09-22 ENCOUNTER — CARDPULM VISIT (OUTPATIENT)
Dept: CARDIAC REHAB | Facility: HOSPITAL | Age: 68
End: 2023-09-22
Attending: INTERNAL MEDICINE
Payer: MEDICARE

## 2023-09-22 PROCEDURE — 93798 PHYS/QHP OP CAR RHAB W/ECG: CPT

## 2023-09-25 ENCOUNTER — CARDPULM VISIT (OUTPATIENT)
Dept: CARDIAC REHAB | Facility: HOSPITAL | Age: 68
End: 2023-09-25
Attending: INTERNAL MEDICINE
Payer: MEDICARE

## 2023-09-25 PROCEDURE — 93798 PHYS/QHP OP CAR RHAB W/ECG: CPT

## 2023-09-27 ENCOUNTER — HOSPITAL ENCOUNTER (OUTPATIENT)
Dept: MAMMOGRAPHY | Age: 68
Discharge: HOME | End: 2023-09-27
Attending: INTERNAL MEDICINE
Payer: MEDICARE

## 2023-09-27 ENCOUNTER — CARDPULM VISIT (OUTPATIENT)
Dept: CARDIAC REHAB | Facility: HOSPITAL | Age: 68
End: 2023-09-27
Attending: INTERNAL MEDICINE
Payer: MEDICARE

## 2023-09-27 DIAGNOSIS — Z12.31 ENCOUNTER FOR SCREENING MAMMOGRAM FOR MALIGNANT NEOPLASM OF BREAST: ICD-10-CM

## 2023-09-27 PROCEDURE — 77063 BREAST TOMOSYNTHESIS BI: CPT | Performed by: INTERNAL MEDICINE

## 2023-09-27 PROCEDURE — 77067 SCR MAMMO BI INCL CAD: CPT | Performed by: INTERNAL MEDICINE

## 2023-09-27 PROCEDURE — 93798 PHYS/QHP OP CAR RHAB W/ECG: CPT

## 2023-09-29 ENCOUNTER — CARDPULM VISIT (OUTPATIENT)
Dept: CARDIAC REHAB | Facility: HOSPITAL | Age: 68
End: 2023-09-29
Attending: INTERNAL MEDICINE
Payer: MEDICARE

## 2023-09-29 PROCEDURE — 93798 PHYS/QHP OP CAR RHAB W/ECG: CPT

## 2023-10-02 ENCOUNTER — CARDPULM VISIT (OUTPATIENT)
Dept: CARDIAC REHAB | Facility: HOSPITAL | Age: 68
End: 2023-10-02
Attending: INTERNAL MEDICINE
Payer: MEDICARE

## 2023-10-02 PROCEDURE — 93798 PHYS/QHP OP CAR RHAB W/ECG: CPT

## 2023-10-04 ENCOUNTER — CARDPULM VISIT (OUTPATIENT)
Dept: CARDIAC REHAB | Facility: HOSPITAL | Age: 68
End: 2023-10-04
Attending: INTERNAL MEDICINE
Payer: MEDICARE

## 2023-10-04 PROCEDURE — 93798 PHYS/QHP OP CAR RHAB W/ECG: CPT

## 2023-10-06 ENCOUNTER — APPOINTMENT (OUTPATIENT)
Dept: CARDIAC REHAB | Facility: HOSPITAL | Age: 68
End: 2023-10-06
Attending: INTERNAL MEDICINE
Payer: MEDICARE

## 2023-10-09 ENCOUNTER — APPOINTMENT (OUTPATIENT)
Dept: CARDIAC REHAB | Facility: HOSPITAL | Age: 68
End: 2023-10-09
Attending: INTERNAL MEDICINE
Payer: MEDICARE

## 2023-10-11 ENCOUNTER — CARDPULM VISIT (OUTPATIENT)
Dept: CARDIAC REHAB | Facility: HOSPITAL | Age: 68
End: 2023-10-11
Attending: INTERNAL MEDICINE
Payer: MEDICARE

## 2023-10-11 PROCEDURE — 93798 PHYS/QHP OP CAR RHAB W/ECG: CPT

## 2023-10-13 ENCOUNTER — CARDPULM VISIT (OUTPATIENT)
Dept: CARDIAC REHAB | Facility: HOSPITAL | Age: 68
End: 2023-10-13
Attending: INTERNAL MEDICINE
Payer: MEDICARE

## 2023-10-13 PROCEDURE — 93798 PHYS/QHP OP CAR RHAB W/ECG: CPT

## 2023-10-16 ENCOUNTER — CARDPULM VISIT (OUTPATIENT)
Dept: CARDIAC REHAB | Facility: HOSPITAL | Age: 68
End: 2023-10-16
Attending: INTERNAL MEDICINE
Payer: MEDICARE

## 2023-10-16 PROCEDURE — 93798 PHYS/QHP OP CAR RHAB W/ECG: CPT

## 2023-10-18 ENCOUNTER — CARDPULM VISIT (OUTPATIENT)
Dept: CARDIAC REHAB | Facility: HOSPITAL | Age: 68
End: 2023-10-18
Attending: INTERNAL MEDICINE
Payer: MEDICARE

## 2023-10-18 PROCEDURE — 93798 PHYS/QHP OP CAR RHAB W/ECG: CPT

## 2023-10-20 ENCOUNTER — APPOINTMENT (OUTPATIENT)
Dept: CARDIAC REHAB | Facility: HOSPITAL | Age: 68
End: 2023-10-20
Attending: INTERNAL MEDICINE
Payer: MEDICARE

## 2023-10-23 ENCOUNTER — CARDPULM VISIT (OUTPATIENT)
Dept: CARDIAC REHAB | Facility: HOSPITAL | Age: 68
End: 2023-10-23
Attending: INTERNAL MEDICINE
Payer: MEDICARE

## 2023-10-23 PROCEDURE — 93798 PHYS/QHP OP CAR RHAB W/ECG: CPT

## 2023-10-25 ENCOUNTER — CARDPULM VISIT (OUTPATIENT)
Dept: CARDIAC REHAB | Facility: HOSPITAL | Age: 68
End: 2023-10-25
Attending: INTERNAL MEDICINE
Payer: MEDICARE

## 2023-10-25 PROCEDURE — 93798 PHYS/QHP OP CAR RHAB W/ECG: CPT

## 2023-10-27 ENCOUNTER — CARDPULM VISIT (OUTPATIENT)
Dept: CARDIAC REHAB | Facility: HOSPITAL | Age: 68
End: 2023-10-27
Attending: INTERNAL MEDICINE
Payer: MEDICARE

## 2023-10-27 PROCEDURE — 93798 PHYS/QHP OP CAR RHAB W/ECG: CPT

## 2023-10-30 ENCOUNTER — CARDPULM VISIT (OUTPATIENT)
Dept: CARDIAC REHAB | Facility: HOSPITAL | Age: 68
End: 2023-10-30
Attending: INTERNAL MEDICINE
Payer: MEDICARE

## 2023-10-30 PROCEDURE — 93798 PHYS/QHP OP CAR RHAB W/ECG: CPT

## 2023-11-01 ENCOUNTER — CARDPULM VISIT (OUTPATIENT)
Dept: CARDIAC REHAB | Facility: HOSPITAL | Age: 68
End: 2023-11-01
Attending: INTERNAL MEDICINE
Payer: MEDICARE

## 2023-11-01 PROCEDURE — 93798 PHYS/QHP OP CAR RHAB W/ECG: CPT

## 2023-11-03 ENCOUNTER — APPOINTMENT (OUTPATIENT)
Dept: CARDIAC REHAB | Facility: HOSPITAL | Age: 68
End: 2023-11-03
Attending: INTERNAL MEDICINE
Payer: MEDICARE

## 2023-11-06 ENCOUNTER — APPOINTMENT (OUTPATIENT)
Dept: CARDIAC REHAB | Facility: HOSPITAL | Age: 68
End: 2023-11-06
Attending: INTERNAL MEDICINE
Payer: MEDICARE

## 2023-11-08 ENCOUNTER — APPOINTMENT (OUTPATIENT)
Dept: CARDIAC REHAB | Facility: HOSPITAL | Age: 68
End: 2023-11-08
Attending: INTERNAL MEDICINE
Payer: MEDICARE

## 2023-11-10 ENCOUNTER — APPOINTMENT (OUTPATIENT)
Dept: CARDIAC REHAB | Facility: HOSPITAL | Age: 68
End: 2023-11-10
Attending: INTERNAL MEDICINE
Payer: MEDICARE

## 2023-11-13 ENCOUNTER — APPOINTMENT (OUTPATIENT)
Dept: CARDIAC REHAB | Facility: HOSPITAL | Age: 68
End: 2023-11-13
Attending: INTERNAL MEDICINE
Payer: MEDICARE

## 2023-11-15 ENCOUNTER — APPOINTMENT (OUTPATIENT)
Dept: CARDIAC REHAB | Facility: HOSPITAL | Age: 68
End: 2023-11-15
Attending: INTERNAL MEDICINE
Payer: MEDICARE

## 2023-11-17 ENCOUNTER — CARDPULM VISIT (OUTPATIENT)
Dept: CARDIAC REHAB | Facility: HOSPITAL | Age: 68
End: 2023-11-17
Attending: INTERNAL MEDICINE
Payer: MEDICARE

## 2023-11-17 PROCEDURE — 93798 PHYS/QHP OP CAR RHAB W/ECG: CPT

## 2023-11-20 ENCOUNTER — APPOINTMENT (OUTPATIENT)
Dept: CARDIAC REHAB | Facility: HOSPITAL | Age: 68
End: 2023-11-20
Attending: INTERNAL MEDICINE
Payer: MEDICARE

## 2023-11-22 ENCOUNTER — CARDPULM VISIT (OUTPATIENT)
Dept: CARDIAC REHAB | Facility: HOSPITAL | Age: 68
End: 2023-11-22
Attending: INTERNAL MEDICINE
Payer: MEDICARE

## 2023-11-22 PROCEDURE — 93798 PHYS/QHP OP CAR RHAB W/ECG: CPT

## 2023-11-24 ENCOUNTER — CARDPULM VISIT (OUTPATIENT)
Dept: CARDIAC REHAB | Facility: HOSPITAL | Age: 68
End: 2023-11-24
Attending: INTERNAL MEDICINE
Payer: MEDICARE

## 2023-11-24 PROCEDURE — 93798 PHYS/QHP OP CAR RHAB W/ECG: CPT

## 2023-11-27 ENCOUNTER — CARDPULM VISIT (OUTPATIENT)
Dept: CARDIAC REHAB | Facility: HOSPITAL | Age: 68
End: 2023-11-27
Attending: INTERNAL MEDICINE
Payer: MEDICARE

## 2023-11-27 PROCEDURE — 93798 PHYS/QHP OP CAR RHAB W/ECG: CPT

## 2023-11-29 ENCOUNTER — CARDPULM VISIT (OUTPATIENT)
Dept: CARDIAC REHAB | Facility: HOSPITAL | Age: 68
End: 2023-11-29
Attending: INTERNAL MEDICINE
Payer: MEDICARE

## 2023-11-29 PROCEDURE — 93798 PHYS/QHP OP CAR RHAB W/ECG: CPT

## 2023-12-01 ENCOUNTER — CARDPULM VISIT (OUTPATIENT)
Dept: CARDIAC REHAB | Facility: HOSPITAL | Age: 68
End: 2023-12-01
Attending: INTERNAL MEDICINE
Payer: MEDICARE

## 2023-12-01 PROCEDURE — 93798 PHYS/QHP OP CAR RHAB W/ECG: CPT

## 2023-12-04 ENCOUNTER — HOSPITAL ENCOUNTER (OUTPATIENT)
Age: 68
Discharge: HOME | End: 2023-12-04
Payer: MEDICARE

## 2023-12-04 ENCOUNTER — APPOINTMENT (OUTPATIENT)
Dept: CARDIAC REHAB | Facility: HOSPITAL | Age: 68
End: 2023-12-04
Attending: INTERNAL MEDICINE
Payer: MEDICARE

## 2023-12-04 ENCOUNTER — APPOINTMENT (OUTPATIENT)
Dept: GENERAL RADIOLOGY | Age: 68
End: 2023-12-04
Attending: NURSE PRACTITIONER
Payer: MEDICARE

## 2023-12-04 VITALS
HEART RATE: 60 BPM | DIASTOLIC BLOOD PRESSURE: 78 MMHG | SYSTOLIC BLOOD PRESSURE: 134 MMHG | RESPIRATION RATE: 22 BRPM | TEMPERATURE: 97 F | OXYGEN SATURATION: 96 %

## 2023-12-04 DIAGNOSIS — S60.211A CONTUSION OF RIGHT WRIST, INITIAL ENCOUNTER: ICD-10-CM

## 2023-12-04 DIAGNOSIS — S69.90XA WRIST INJURY: Primary | ICD-10-CM

## 2023-12-04 PROCEDURE — 99213 OFFICE O/P EST LOW 20 MIN: CPT | Performed by: NURSE PRACTITIONER

## 2023-12-04 PROCEDURE — 73110 X-RAY EXAM OF WRIST: CPT | Performed by: NURSE PRACTITIONER

## 2023-12-04 NOTE — DISCHARGE INSTRUCTIONS
RICE:     Rest and elevate the affected extremity. Apply ice 4 times daily with a cloth barrier to reduce swelling. You may wear an Ace bandage for comfort and support and to help reduce swelling. You may take  Tylenol 1000mg every 6 hours as needed for pain. Follow-up with your primary care physician in 2-3 days as needed. Return to the emergency room if you develop new or worsening symptoms.

## 2023-12-06 ENCOUNTER — CARDPULM VISIT (OUTPATIENT)
Dept: CARDIAC REHAB | Facility: HOSPITAL | Age: 68
End: 2023-12-06
Attending: INTERNAL MEDICINE
Payer: MEDICARE

## 2023-12-06 PROCEDURE — 93798 PHYS/QHP OP CAR RHAB W/ECG: CPT

## 2023-12-08 ENCOUNTER — CARDPULM VISIT (OUTPATIENT)
Dept: CARDIAC REHAB | Facility: HOSPITAL | Age: 68
End: 2023-12-08
Attending: INTERNAL MEDICINE
Payer: MEDICARE

## 2023-12-08 PROCEDURE — 93798 PHYS/QHP OP CAR RHAB W/ECG: CPT

## 2023-12-11 ENCOUNTER — CARDPULM VISIT (OUTPATIENT)
Dept: CARDIAC REHAB | Facility: HOSPITAL | Age: 68
End: 2023-12-11
Attending: INTERNAL MEDICINE
Payer: MEDICARE

## 2023-12-11 PROCEDURE — 93798 PHYS/QHP OP CAR RHAB W/ECG: CPT

## 2023-12-13 ENCOUNTER — CARDPULM VISIT (OUTPATIENT)
Dept: CARDIAC REHAB | Facility: HOSPITAL | Age: 68
End: 2023-12-13
Attending: INTERNAL MEDICINE
Payer: MEDICARE

## 2023-12-13 PROCEDURE — 93798 PHYS/QHP OP CAR RHAB W/ECG: CPT

## 2023-12-15 ENCOUNTER — CARDPULM VISIT (OUTPATIENT)
Dept: CARDIAC REHAB | Facility: HOSPITAL | Age: 68
End: 2023-12-15
Attending: INTERNAL MEDICINE
Payer: MEDICARE

## 2023-12-15 PROCEDURE — 93798 PHYS/QHP OP CAR RHAB W/ECG: CPT

## 2023-12-18 ENCOUNTER — APPOINTMENT (OUTPATIENT)
Dept: CARDIAC REHAB | Facility: HOSPITAL | Age: 68
End: 2023-12-18
Attending: INTERNAL MEDICINE
Payer: MEDICARE

## 2023-12-20 ENCOUNTER — APPOINTMENT (OUTPATIENT)
Dept: CARDIAC REHAB | Facility: HOSPITAL | Age: 68
End: 2023-12-20
Attending: INTERNAL MEDICINE
Payer: MEDICARE

## 2023-12-20 PROCEDURE — 93798 PHYS/QHP OP CAR RHAB W/ECG: CPT

## 2023-12-22 ENCOUNTER — CARDPULM VISIT (OUTPATIENT)
Dept: CARDIAC REHAB | Facility: HOSPITAL | Age: 68
End: 2023-12-22
Attending: INTERNAL MEDICINE
Payer: MEDICARE

## 2023-12-22 PROCEDURE — 93798 PHYS/QHP OP CAR RHAB W/ECG: CPT

## 2023-12-27 ENCOUNTER — CARDPULM VISIT (OUTPATIENT)
Dept: CARDIAC REHAB | Facility: HOSPITAL | Age: 68
End: 2023-12-27
Attending: INTERNAL MEDICINE
Payer: MEDICARE

## 2023-12-27 PROCEDURE — 93798 PHYS/QHP OP CAR RHAB W/ECG: CPT

## 2023-12-29 ENCOUNTER — CARDPULM VISIT (OUTPATIENT)
Dept: CARDIAC REHAB | Facility: HOSPITAL | Age: 68
End: 2023-12-29
Attending: INTERNAL MEDICINE
Payer: MEDICARE

## 2023-12-29 PROCEDURE — 93798 PHYS/QHP OP CAR RHAB W/ECG: CPT

## 2024-01-03 ENCOUNTER — CARDPULM VISIT (OUTPATIENT)
Dept: CARDIAC REHAB | Facility: HOSPITAL | Age: 69
End: 2024-01-03
Attending: INTERNAL MEDICINE
Payer: MEDICARE

## 2024-01-03 PROCEDURE — 93798 PHYS/QHP OP CAR RHAB W/ECG: CPT

## 2024-01-05 ENCOUNTER — CARDPULM VISIT (OUTPATIENT)
Dept: CARDIAC REHAB | Facility: HOSPITAL | Age: 69
End: 2024-01-05
Attending: INTERNAL MEDICINE
Payer: MEDICARE

## 2024-01-05 PROCEDURE — 93798 PHYS/QHP OP CAR RHAB W/ECG: CPT

## 2024-02-02 ENCOUNTER — LAB ENCOUNTER (OUTPATIENT)
Dept: LAB | Age: 69
End: 2024-02-02
Attending: NURSE PRACTITIONER
Payer: MEDICARE

## 2024-02-02 ENCOUNTER — OFFICE VISIT (OUTPATIENT)
Dept: FAMILY MEDICINE CLINIC | Facility: CLINIC | Age: 69
End: 2024-02-02
Payer: MEDICARE

## 2024-02-02 VITALS
BODY MASS INDEX: 32.43 KG/M2 | WEIGHT: 183 LBS | HEART RATE: 73 BPM | HEIGHT: 63 IN | SYSTOLIC BLOOD PRESSURE: 130 MMHG | TEMPERATURE: 99 F | OXYGEN SATURATION: 98 % | RESPIRATION RATE: 16 BRPM | DIASTOLIC BLOOD PRESSURE: 68 MMHG

## 2024-02-02 DIAGNOSIS — I48.0 PAROXYSMAL ATRIAL FIBRILLATION (HCC): Primary | ICD-10-CM

## 2024-02-02 DIAGNOSIS — J11.1 INFLUENZA-LIKE ILLNESS: Primary | ICD-10-CM

## 2024-02-02 DIAGNOSIS — C92.60 AML WITH T(9;11)(P22;Q23); MLLT3-MLL (HCC): ICD-10-CM

## 2024-02-02 DIAGNOSIS — R00.0 TACHYCARDIA, UNSPECIFIED: ICD-10-CM

## 2024-02-02 DIAGNOSIS — Z20.822 EXPOSURE TO COVID-19 VIRUS: ICD-10-CM

## 2024-02-02 LAB
ANION GAP SERPL CALC-SCNC: 2 MMOL/L (ref 0–18)
BUN BLD-MCNC: 17 MG/DL (ref 9–23)
CALCIUM BLD-MCNC: 9.5 MG/DL (ref 8.5–10.1)
CHLORIDE SERPL-SCNC: 110 MMOL/L (ref 98–112)
CO2 SERPL-SCNC: 30 MMOL/L (ref 21–32)
CREAT BLD-MCNC: 0.82 MG/DL
EGFRCR SERPLBLD CKD-EPI 2021: 77 ML/MIN/1.73M2 (ref 60–?)
FASTING STATUS PATIENT QL REPORTED: NO
GLUCOSE BLD-MCNC: 91 MG/DL (ref 70–99)
OPERATOR ID: NORMAL
OSMOLALITY SERPL CALC.SUM OF ELEC: 295 MOSM/KG (ref 275–295)
POTASSIUM SERPL-SCNC: 4.9 MMOL/L (ref 3.5–5.1)
RAPID SARS-COV-2 BY PCR: NOT DETECTED
SODIUM SERPL-SCNC: 142 MMOL/L (ref 136–145)

## 2024-02-02 PROCEDURE — 87637 SARSCOV2&INF A&B&RSV AMP PRB: CPT | Performed by: NURSE PRACTITIONER

## 2024-02-02 PROCEDURE — 80048 BASIC METABOLIC PNL TOTAL CA: CPT

## 2024-02-02 PROCEDURE — 99202 OFFICE O/P NEW SF 15 MIN: CPT | Performed by: NURSE PRACTITIONER

## 2024-02-02 PROCEDURE — U0002 COVID-19 LAB TEST NON-CDC: HCPCS | Performed by: NURSE PRACTITIONER

## 2024-02-02 RX ORDER — LOSARTAN POTASSIUM 25 MG/1
25 TABLET ORAL DAILY
COMMUNITY
Start: 2024-01-17

## 2024-02-02 NOTE — PROGRESS NOTES
HPI:   Katelyn Gutierrez is a 68 year old female who presents with ill symptoms for  2  days. Patient reports headache, sweating. Has tried nothing for relief. Exposure to Covid with coworker in car positive for Covid, less than 6 days ago.     Current Outpatient Medications   Medication Sig Dispense Refill    aspirin 81 MG Oral Tab EC aspirin 81 mg tablet,delayed release, [RxNorm: 201667]      metFORMIN  MG Oral Tablet 24 Hr Take 1 tablet (750 mg total) by mouth daily with breakfast.      Coenzyme Q10 (CO Q-10 OR) Take by mouth.      Omega-3 Fatty Acids (FISH OIL BURP-LESS OR) Take by mouth.      TURMERIC OR Take by mouth.      prasugrel 10 MG Oral Tab Take 1 tablet (10 mg total) by mouth daily. 90 tablet 3    L-methylfolate Calcium 15 MG Oral Tab Take 1 tablet (15 mg total) by mouth daily.      metoprolol succinate ER 50 MG Oral Tablet 24 Hr metoprolol succinate ER 50 mg tablet,extended release 24 hr, [RxNorm: 051930]       No current facility-administered medications for this visit.      Past Medical History:   Diagnosis Date    Abnormal pelvic exam 9/10/2020    Cervical lesion 3/20/2014    Fuchs' corneal dystrophy     Lyme disease     OSTEOARTHRITIS     Post-operative nausea and vomiting     pt states she often wakes up during surgery    PTSD (post-traumatic stress disorder)     9/11    Rotator cuff injury       Past Surgical History:   Procedure Laterality Date    BENIGN BIOPSY LEFT Left 1993    CARPAL TUNNEL RELEASE      EYE SURGERY      FOOT SURGERY      HAND/FINGER SURGERY UNLISTED Right 10/19/20--Dr. Paulo Shay    ring finger A1 pulley release/trigger finger release    BRETT LOCALIZATION WIRE 1 SITE LEFT (CPT=19281)      1990's bn    OTHER  1994    OTHER SURGICAL HISTORY      A RCR RIGHT SHOULDER     OTHER SURGICAL HISTORY      TRIGGER RELEASE RIGHT THUMB    OTHER SURGICAL HISTORY      CTR BILATERAL      OTHER SURGICAL HISTORY      NEUROMA REMOVED FOOT     REPAIR ROTATOR CUFF,ACUTE        Family  History   Problem Relation Age of Onset    Heart Disorder Father     Depression Sister     Kidney Disease Sister     Heart Disorder Maternal Grandfather     Heart Disorder Paternal Grandfather     Breast Cancer Paternal Aunt 70        Dx Breast CA age 70    Dementia Mother     Alcohol and Other Disorders Associated Mother     Bleeding Disorders Neg     Colon Cancer Neg     Ovarian Cancer Neg       Social History     Socioeconomic History    Marital status: Life Partner    Number of children: 1    Years of education: 17   Occupational History    Occupation: business      Comment: [employer]    Occupation: unemployed now   Tobacco Use    Smoking status: Former     Packs/day: 1.00     Years: 2.00     Additional pack years: 0.00     Total pack years: 2.00     Types: Cigarettes     Quit date: 1974     Years since quittin.7    Smokeless tobacco: Never   Vaping Use    Vaping Use: Never used   Substance and Sexual Activity    Alcohol use: Not Currently     Comment: occasional    Drug use: No    Sexual activity: Yes     Partners: Female, Male     Comment: patient has one child    Other Topics Concern    Seat Belt Yes   Social History Narrative    ** Merged History Encounter **              REVIEW OF SYSTEMS:   GENERAL: feels well otherwise, fatigued as above, unsure if from coming off medications  HEENT: not really congested, as above in HPI  LUNGS: denies shortness of breath with exertion  CARDIOVASCULAR: denies chest pain on exertion  GI: no nausea or abdominal pain, appetite down  NEURO: admits to headaches    EXAM:   /82   Pulse 73   Temp 98.8 °F (37.1 °C) (Oral)   Resp 16   Ht 5' 3\" (1.6 m)   Wt 183 lb (83 kg)   LMP  (LMP Unknown)   SpO2 98%   BMI 32.42 kg/m²   GENERAL: well developed, well nourished,in no apparent distress  HEENT: atraumatic, normocephalic,ears clear, nares with minimal mucus, throat normal appearing. [place]la midline.  No sinus tenderness with  palpation.  NECK: supple,no adenopathy  LUNGS: clear to auscultation  CARDIO: RRR without murmur  Results for orders placed or performed in visit on 02/02/24   COVID-19 LAB TEST NON-CDC    Collection Time: 02/02/24  2:14 PM    Specimen: Nares   Result Value Ref Range    Rapid SARS-CoV-2 by PCR Not Detected Not Detected    POCT Lot Number [id number]     POCT Expiration Date 10/6/25     POCT Procedure Control Control Valid Control Valid     ,704          ASSESSMENT AND PLAN:    PLAN:Katelyn was seen today for headache.    Diagnoses and all orders for this visit:    Influenza-like illness  -     COVID-19 LAB TEST NON-CDC  -     SARS-CoV-2/Flu A and B/RSV by PCR ([facility])    Exposure to COVID-19 virus  -     COVID-19 LAB TEST NON-CDC  -     SARS-CoV-2/Flu A and B/RSV by PCR ([facility])      Negative rapid Covid. Sent viral quad panel to lab . Self care discussed. Reassurance given. Medication use and risk/benefit discussed. Patient is advised to follow up with PCP if not improving with treatment plan or seek immediate care if symptoms worsen.  The patient indicates understanding of these issues and agrees to the plan.  Patient Instructions   Please continue to rest, stay well hydrated. Covid today is negative, we will send a viral panel to check for Influenza and RSV. If negative you may start your cholesterol injections when feeling better.  Follow up with your primary care doctor if symptoms persist or seek immediate care if symptoms worsen.

## 2024-02-02 NOTE — PATIENT INSTRUCTIONS
Please continue to rest, stay well hydrated. Covid today is negative, we will send a viral panel to check for Influenza and RSV. If negative you may start your cholesterol injections when feeling better.  Follow up with your primary care doctor if symptoms persist or seek immediate care if symptoms worsen.

## 2024-02-03 LAB
FLUAV + FLUBV RNA SPEC NAA+PROBE: NOT DETECTED
FLUAV + FLUBV RNA SPEC NAA+PROBE: NOT DETECTED
RSV RNA SPEC NAA+PROBE: NOT DETECTED
SARS-COV-2 RNA RESP QL NAA+PROBE: NOT DETECTED

## 2024-02-08 ENCOUNTER — OFFICE VISIT (OUTPATIENT)
Dept: FAMILY MEDICINE CLINIC | Facility: CLINIC | Age: 69
End: 2024-02-08
Payer: MEDICARE

## 2024-02-08 VITALS
SYSTOLIC BLOOD PRESSURE: 120 MMHG | WEIGHT: 183 LBS | BODY MASS INDEX: 32.43 KG/M2 | HEIGHT: 63 IN | TEMPERATURE: 97 F | RESPIRATION RATE: 18 BRPM | HEART RATE: 90 BPM | DIASTOLIC BLOOD PRESSURE: 70 MMHG

## 2024-02-08 DIAGNOSIS — E66.9 CLASS 1 OBESITY WITH BODY MASS INDEX (BMI) OF 32.0 TO 32.9 IN ADULT, UNSPECIFIED OBESITY TYPE, UNSPECIFIED WHETHER SERIOUS COMORBIDITY PRESENT: ICD-10-CM

## 2024-02-08 DIAGNOSIS — R79.89 ELEVATED TSH: ICD-10-CM

## 2024-02-08 DIAGNOSIS — I10 HYPERTENSION, UNSPECIFIED TYPE: Primary | ICD-10-CM

## 2024-02-08 DIAGNOSIS — Z95.5 S/P CORONARY ARTERY STENT PLACEMENT: ICD-10-CM

## 2024-02-08 DIAGNOSIS — M79.10 MYALGIA DUE TO STATIN: ICD-10-CM

## 2024-02-08 DIAGNOSIS — T46.6X5A MYALGIA DUE TO STATIN: ICD-10-CM

## 2024-02-08 DIAGNOSIS — R73.03 PREDIABETES: ICD-10-CM

## 2024-02-08 DIAGNOSIS — R73.9 ELEVATED BLOOD SUGAR: ICD-10-CM

## 2024-02-08 PROBLEM — E78.00 HIGH BLOOD CHOLESTEROL: Status: ACTIVE | Noted: 2020-09-10

## 2024-02-08 PROBLEM — Z98.890 HISTORY OF THUMB SURGERY: Status: ACTIVE | Noted: 2021-02-21

## 2024-02-08 PROBLEM — I70.0 THORACIC AORTA ATHEROSCLEROSIS (HCC): Status: ACTIVE | Noted: 2023-09-11

## 2024-02-08 PROBLEM — D68.69 SECONDARY HYPERCOAGULABLE STATE (HCC): Status: ACTIVE | Noted: 2023-09-11

## 2024-02-08 PROBLEM — Q23.81 BICUSPID AORTIC VALVE: Status: ACTIVE | Noted: 2022-08-08

## 2024-02-08 PROBLEM — I65.23 BILATERAL CAROTID ARTERY STENOSIS: Status: ACTIVE | Noted: 2023-08-01

## 2024-02-08 PROBLEM — Q23.81 BICUSPID AORTIC VALVE: Status: RESOLVED | Noted: 2022-08-08 | Resolved: 2024-02-08

## 2024-02-08 PROBLEM — H10.13 ALLERGIC CONJUNCTIVITIS OF BOTH EYES: Status: ACTIVE | Noted: 2022-05-18

## 2024-02-08 PROBLEM — R10.12 LUQ PAIN: Status: ACTIVE | Noted: 2023-07-05

## 2024-02-08 PROBLEM — Q23.1 BICUSPID AORTIC VALVE: Status: RESOLVED | Noted: 2022-08-08 | Resolved: 2024-02-08

## 2024-02-08 PROBLEM — Q23.1 BICUSPID AORTIC VALVE: Status: ACTIVE | Noted: 2022-08-08

## 2024-02-08 PROBLEM — H04.129 TEAR FILM INSUFFICIENCY: Status: ACTIVE | Noted: 2017-04-21

## 2024-02-08 PROBLEM — I25.10 CORONARY ARTERY DISEASE INVOLVING NATIVE CORONARY ARTERY OF NATIVE HEART WITHOUT ANGINA PECTORIS: Status: ACTIVE | Noted: 2023-09-28

## 2024-02-08 PROBLEM — I47.10 SVT (SUPRAVENTRICULAR TACHYCARDIA) (HCC): Status: ACTIVE | Noted: 2022-07-18

## 2024-02-08 PROBLEM — R00.0 TACHYCARDIA: Status: ACTIVE | Noted: 2023-07-05

## 2024-02-08 PROBLEM — Q23.1 BICUSPID AORTIC VALVE (HCC): Status: ACTIVE | Noted: 2022-08-08

## 2024-02-08 PROBLEM — F32.0 CURRENT MILD EPISODE OF MAJOR DEPRESSIVE DISORDER WITHOUT PRIOR EPISODE (HCC): Status: ACTIVE | Noted: 2022-10-06

## 2024-02-08 PROBLEM — Q23.1 BICUSPID AORTIC VALVE (HCC): Status: RESOLVED | Noted: 2022-08-08 | Resolved: 2024-02-08

## 2024-02-08 PROBLEM — R79.82 ELEVATED C-REACTIVE PROTEIN (CRP): Status: ACTIVE | Noted: 2023-08-01

## 2024-02-08 PROBLEM — M35.3 PMR (POLYMYALGIA RHEUMATICA) (HCC): Status: ACTIVE | Noted: 2023-09-28

## 2024-02-08 PROBLEM — I48.0 PAROXYSMAL A-FIB (HCC): Status: ACTIVE | Noted: 2023-07-05

## 2024-02-08 PROBLEM — I47.10 SVT (SUPRAVENTRICULAR TACHYCARDIA): Status: ACTIVE | Noted: 2022-07-18

## 2024-02-08 PROBLEM — M25.50 POLYARTHRALGIA: Status: ACTIVE | Noted: 2023-02-22

## 2024-02-08 PROBLEM — R70.0 ESR RAISED: Status: ACTIVE | Noted: 2023-08-01

## 2024-02-08 PROBLEM — M85.80 OSTEOPENIA: Status: ACTIVE | Noted: 2020-09-10

## 2024-02-08 PROBLEM — F32.0 CURRENT MILD EPISODE OF MAJOR DEPRESSIVE DISORDER WITHOUT PRIOR EPISODE: Status: ACTIVE | Noted: 2022-10-06

## 2024-02-08 PROBLEM — I70.0 THORACIC AORTA ATHEROSCLEROSIS: Status: ACTIVE | Noted: 2023-09-11

## 2024-02-08 RX ORDER — EVOLOCUMAB 140 MG/ML
INJECTION, SOLUTION SUBCUTANEOUS
COMMUNITY
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
## 2024-03-01 ENCOUNTER — OFFICE VISIT (OUTPATIENT)
Dept: FAMILY MEDICINE CLINIC | Facility: CLINIC | Age: 69
End: 2024-03-01
Payer: MEDICARE

## 2024-03-01 VITALS
BODY MASS INDEX: 32.43 KG/M2 | WEIGHT: 183 LBS | OXYGEN SATURATION: 99 % | DIASTOLIC BLOOD PRESSURE: 64 MMHG | RESPIRATION RATE: 16 BRPM | TEMPERATURE: 98 F | HEIGHT: 63 IN | HEART RATE: 76 BPM | SYSTOLIC BLOOD PRESSURE: 136 MMHG

## 2024-03-01 DIAGNOSIS — M25.562 ACUTE PAIN OF LEFT KNEE: ICD-10-CM

## 2024-03-01 DIAGNOSIS — N30.01 ACUTE CYSTITIS WITH HEMATURIA: Primary | ICD-10-CM

## 2024-03-01 LAB
APPEARANCE: CLEAR
BILIRUBIN: NEGATIVE
GLUCOSE (URINE DIPSTICK): NEGATIVE MG/DL
KETONES (URINE DIPSTICK): NEGATIVE MG/DL
MULTISTIX LOT#: ABNORMAL NUMERIC
NITRITE, URINE: NEGATIVE
PH, URINE: 6 (ref 4.5–8)
PROTEIN (URINE DIPSTICK): NEGATIVE MG/DL
SPECIFIC GRAVITY: 1.01 (ref 1–1.03)
URINE-COLOR: YELLOW
UROBILINOGEN,SEMI-QN: 0.2 MG/DL (ref 0–1.9)

## 2024-03-01 PROCEDURE — 87086 URINE CULTURE/COLONY COUNT: CPT | Performed by: PHYSICIAN ASSISTANT

## 2024-03-01 PROCEDURE — 99214 OFFICE O/P EST MOD 30 MIN: CPT | Performed by: PHYSICIAN ASSISTANT

## 2024-03-01 PROCEDURE — 87088 URINE BACTERIA CULTURE: CPT | Performed by: PHYSICIAN ASSISTANT

## 2024-03-01 PROCEDURE — 87186 SC STD MICRODIL/AGAR DIL: CPT | Performed by: PHYSICIAN ASSISTANT

## 2024-03-01 PROCEDURE — 81003 URINALYSIS AUTO W/O SCOPE: CPT | Performed by: PHYSICIAN ASSISTANT

## 2024-03-01 RX ORDER — NITROFURANTOIN 25; 75 MG/1; MG/1
100 CAPSULE ORAL 2 TIMES DAILY
Qty: 14 CAPSULE | Refills: 0 | Status: SHIPPED | OUTPATIENT
Start: 2024-03-01 | End: 2024-03-08

## 2024-03-01 NOTE — PROGRESS NOTES
CHIEF COMPLAINT:     Chief Complaint   Patient presents with    UTI     UTI may just beginning and knee pain. - Entered by patient  x 2 nights ago was trying to urinate and nothing came out, lack of out put, did take cranberry pills, x 1 week ago 2/27, left leg just swelled up and was in extreme pain, saw Rheum on Friday and was put on steroids and and injection, knee pain goes up leg into groin        HPI:   Katelyn Gutierrez is a 68 year old female who presents with symptoms of UTI. Complaining of urinary frequency, urgency, dysuria for last 2 days. Denies flank pain, abdominal pain, back pain,  fever, hematuria, nausea, or vomiting.  Denies unusual vaginal discharge or itching, new sexual partners in the last 3 months, or recent unprotected sexual intercourse.     Since 2/21/24 has had left knee swelling/pain. No injury. Saw rheumatology 2/22/24 and steroid injection done and started on prednisone. Still on prednisone. Swelling and pain improving, but still there     Current Outpatient Medications   Medication Sig Dispense Refill    nitrofurantoin monohydrate macro 100 MG Oral Cap Take 1 capsule (100 mg total) by mouth 2 (two) times daily for 7 days. 14 capsule 0    Evolocumab (REPATHA SURECLICK) 140 MG/ML Subcutaneous Solution Auto-injector Inject into the skin.      losartan 25 MG Oral Tab Take 1 tablet (25 mg total) by mouth daily.      aspirin 81 MG Oral Tab EC aspirin 81 mg tablet,delayed release, [RxNorm: 391905]      metFORMIN  MG Oral Tablet 24 Hr Take 1 tablet (750 mg total) by mouth daily with breakfast.      Coenzyme Q10 (CO Q-10 OR) Take by mouth.      Omega-3 Fatty Acids (FISH OIL BURP-LESS OR) Take by mouth.      TURMERIC OR Take by mouth.      prasugrel 10 MG Oral Tab Take 1 tablet (10 mg total) by mouth daily. 90 tablet 3    L-methylfolate Calcium 15 MG Oral Tab Take 1 tablet (15 mg total) by mouth daily.      loteprednol 0.5 % Ophthalmic Suspension Place 1 drop into both eyes 4 (four)  times daily.        Past Medical History:   Diagnosis Date    Abnormal pelvic exam 09/10/2020    Bicuspid aortic valve (HCC) 2022    Formatting of this note might be different from the original.   Formatting of this note might be different from the original.   TTE  w/o change   F/u with cardiology for serial monitoring    Cervical lesion 2014    Fuchs' corneal dystrophy     Lyme disease     OSTEOARTHRITIS     Post-operative nausea and vomiting     pt states she often wakes up during surgery    PTSD (post-traumatic stress disorder)         Rotator cuff injury       Social History:  Social History     Socioeconomic History    Marital status: Life Partner    Number of children: 1    Years of education: 17   Occupational History    Occupation: business      Comment: [employer]    Occupation: unemployed now   Tobacco Use    Smoking status: Former     Packs/day: 1.00     Years: 2.00     Additional pack years: 0.00     Total pack years: 2.00     Types: Cigarettes     Quit date: 1974     Years since quittin.7    Smokeless tobacco: Never   Vaping Use    Vaping Use: Never used   Substance and Sexual Activity    Alcohol use: Not Currently     Comment: occasional    Drug use: No    Sexual activity: Yes     Partners: Female, Male     Comment: patient has one child    Other Topics Concern    Seat Belt Yes   Social History Narrative    ** Merged History Encounter **              REVIEW OF SYSTEMS:   GENERAL: Denies fever, chills, or body aches  SKIN: no rashes, no skin wounds or ulcers.  CARDIOVASCULAR: denies chest pain or palpitations  LUNGS: denies shortness of breath, cough, or wheezing  GI: See HPI. No N/V/C/D.   : See HPI.  Musculo: No back pain     EXAM:   /64   Pulse 76   Temp 98.3 °F (36.8 °C) (Oral)   Resp 16   Ht 5' 3\" (1.6 m)   Wt 183 lb (83 kg)   LMP  (LMP Unknown)   SpO2 99%   BMI 32.42 kg/m²   Physical Exam  Constitutional:       General:  She is not in acute distress.     Appearance: Normal appearance.   HENT:      Head: Normocephalic and atraumatic.   Cardiovascular:      Rate and Rhythm: Normal rate and regular rhythm.      Heart sounds: Normal heart sounds. No murmur heard.  Pulmonary:      Effort: Pulmonary effort is normal.      Breath sounds: Normal breath sounds. No wheezing or rhonchi.   Abdominal:      General: Abdomen is flat. Bowel sounds are normal. There is no distension.      Palpations: Abdomen is soft. There is no mass.      Tenderness: There is no abdominal tenderness. There is no right CVA tenderness, left CVA tenderness or guarding.   Musculoskeletal:      Right knee: Normal.      Left knee: No swelling (no swelling noted compared to right knee), deformity, effusion or erythema. Normal range of motion (FROM but with pain). Tenderness (medial aspect of the knee) present. Normal pulse.      Comments: No left calf swelling, erythema, or tenderness    Neurological:      Mental Status: She is alert.           Recent Results (from the past 24 hour(s))   URINALYSIS, AUTO, W/O SCOPE    Collection Time: 03/01/24 11:15 AM   Result Value Ref Range    Glucose Urine Negative Negative mg/dL    Bilirubin Urine Negative Negative    Ketones, UA Negative Negative - Trace mg/dL    Spec Gravity 1.015 1.005 - 1.030    Blood Urine Moderate (A) Negative    PH Urine 6.0 5.0 - 8.0    Protein Urine Negative Negative - Trace mg/dL    Urobilinogen Urine 0.2 0.2 - 1.0 mg/dL    Nitrite Urine Negative Negative    Leukocyte Esterase Urine Large (A) Negative    APPEARANCE clear Clear    Color Urine yellow Yellow    Multistix Lot# [id number] Numeric    Multistix Expiration Date 8/31/2024 Date         ASSESSMENT AND PLAN:   Katelyn Gutierrez is a 68 year old female presents with UTI symptoms.    ASSESSMENT:  Encounter Diagnoses   Name Primary?    Acute cystitis with hematuria Yes    Acute pain of left knee        PLAN: Meds as listed below.  Comfort measures as  described in Patient Instructions. Will send urine culture.     Meds & Refills for this Visit:  Requested Prescriptions     Signed Prescriptions Disp Refills    nitrofurantoin monohydrate macro 100 MG Oral Cap 14 capsule 0     Sig: Take 1 capsule (100 mg total) by mouth 2 (two) times daily for 7 days.       Risk and benefits of medication discussed.   Stressed importance of completing full course of antibiotic unless told otherwise.     The patient indicates understanding of these issues and agrees to the plan.  The patient is asked to see PCP in 3 days if not better. Seek care immediately for new onset of fever, vomiting, worsening symptoms.    Patient Instructions   Push fluids   Will call or MyChart with culture results   Close follow up with PCP/rheumatology/or ortho for further evaluation and treatment of knee pain   Please follow up with PCP if no improvement or if symptoms worsen

## 2024-03-01 NOTE — PATIENT INSTRUCTIONS
Push fluids   Will call or MyChart with culture results   Close follow up with PCP/rheumatology/or ortho for further evaluation and treatment of knee pain   Please follow up with PCP if no improvement or if symptoms worsen

## 2024-03-04 ENCOUNTER — APPOINTMENT (OUTPATIENT)
Dept: GENERAL RADIOLOGY | Age: 69
End: 2024-03-04
Attending: PHYSICIAN ASSISTANT
Payer: MEDICARE

## 2024-03-04 ENCOUNTER — TELEPHONE (OUTPATIENT)
Dept: FAMILY MEDICINE CLINIC | Facility: CLINIC | Age: 69
End: 2024-03-04

## 2024-03-04 ENCOUNTER — APPOINTMENT (OUTPATIENT)
Dept: ULTRASOUND IMAGING | Age: 69
End: 2024-03-04
Attending: PHYSICIAN ASSISTANT
Payer: MEDICARE

## 2024-03-04 ENCOUNTER — HOSPITAL ENCOUNTER (OUTPATIENT)
Age: 69
Discharge: HOME | End: 2024-03-04
Payer: MEDICARE

## 2024-03-04 VITALS
RESPIRATION RATE: 18 BRPM | HEART RATE: 84 BPM | WEIGHT: 182 LBS | BODY MASS INDEX: 32.25 KG/M2 | SYSTOLIC BLOOD PRESSURE: 115 MMHG | HEIGHT: 63 IN | DIASTOLIC BLOOD PRESSURE: 68 MMHG | OXYGEN SATURATION: 97 % | TEMPERATURE: 98 F

## 2024-03-04 DIAGNOSIS — M25.562 ACUTE PAIN OF LEFT KNEE: Primary | ICD-10-CM

## 2024-03-04 PROCEDURE — 93971 EXTREMITY STUDY: CPT | Performed by: PHYSICIAN ASSISTANT

## 2024-03-04 PROCEDURE — 99215 OFFICE O/P EST HI 40 MIN: CPT

## 2024-03-04 PROCEDURE — 73562 X-RAY EXAM OF KNEE 3: CPT | Performed by: PHYSICIAN ASSISTANT

## 2024-03-04 PROCEDURE — 99214 OFFICE O/P EST MOD 30 MIN: CPT

## 2024-03-04 RX ORDER — PREDNISONE 5 MG/1
5 TABLET ORAL DAILY
COMMUNITY
Start: 2024-02-22

## 2024-03-04 NOTE — TELEPHONE ENCOUNTER
S/w pt on this    Sts swelling starting to go down  Has pain still over a week  Has taken half bottle of the abx    Wonders if I have a blood infection  Sts had infection in blood as a kid    No streaks on her skin  Pain was going to groin, not as much as before, still in pain and ankle    No imaging has been done    Sts in hindsight \"I should have gone to ER, but I was not dying\"    Reviewed recommendations below with her. She agrees to go to [facility] care today for further evaluation. I provided info for the [place] location.    She voiced understanding

## 2024-03-04 NOTE — TELEPHONE ENCOUNTER
Patient should be evaluated but for an infection in her knee would be better suited for IC, or ER if severe pain. Thank you.

## 2024-03-04 NOTE — ED INITIAL ASSESSMENT (HPI)
On 2/21 pt states L leg was swollen - seen by rheum and given injection to L knee, started taking prednisone (6mg, then 10mg), pain worsened last week - pain /swelling is worse to L medial knee down to L ankle - pain also radiates up to L groin    Seen at [facility] on 3/1/24 for urinary symptoms- diagnosed with UTI and on macrobid until 3/8

## 2024-03-04 NOTE — DISCHARGE INSTRUCTIONS
Tylenol Extra Strength every 4-6 hours as needed for pain      The best treatment for minor injuries is R.I.C.E.       R.I.C.E. = Rest  Ice  Compression  Elevation      Rest: Do not use the injured body part unnecessarily.  If this is a lower extremity, do not take long walks, run or do anything that causes increased pain.  Gradually progress to your normal activity, using pain as your guide.       Ice: Apply cold compresses to the injured area. The area that is injured is inflammed. Inflammation causes warmth, so ice may give some relief.  You may ice through a brace or ace wrap.      Compression: Compression to the area will help control swelling. An ace wrap is the most simple form of compression. You can also wear a brace.      Elevation: Raise the injured extremity above heart level. This will reduce throbbing pain and swelling associated with injures.  Prop the injured extremity up with pillows while lying down.

## 2024-03-04 NOTE — TELEPHONE ENCOUNTER
Pt was in [facility] 3/1/24 for UTI and knee pain. She would like follow up appt today if possible. She thinks she has a \"blood infection\" in her knee. Please advise. Thank you.

## 2024-03-04 NOTE — TELEPHONE ENCOUNTER
See notes from [place] and her rheum-she had injection with them 2/22    Per rheum notes 2/22  female with a history of PMR, who is doing well until a few days ago when she developed sudden pain in her left knee with swelling of the left leg. I suspect a possible ruptured Baker's cyst as the cause of her symptoms. I injected the joint for symptom relief, she will be know if this does not help.    Per [facility] notes 3/1    Close follow up with PCP/rheumatology/or ortho for further evaluation and treatment of knee pain   Please follow up with PCP if no improvement or if symptoms worsen     Please advise if you can see pt today for this or can she see another open provider?

## 2024-03-04 NOTE — ED PROVIDER NOTES
Patient Seen in: [facility]      History     Chief Complaint   Patient presents with    Swelling Edema    Leg Pain     Stated Complaint: Leg Pain, Swelling    Subjective:   HPI    68-year-old female who comes in today stating that on 221 she was getting off the train and felt pain in her knee.  She now has pain in the knee that extends down into the calf and also into the thigh.  Most of the pain to the medial aspect of the knee.  She was seen by rheumatologist as she does have a known history of PMR patient had injection to the left knee and is now taking prednisone as prescribed by her rheumatologist she states the pain is not improving and is actually worse.  Denies history of DVT.  Denies shortness of breath or chest pain.  Denies axial low back pain.    Objective:   Past Medical History:   Diagnosis Date    Abnormal pelvic exam 09/10/2020    Bicuspid aortic valve (HCC) 08/08/2022    Formatting of this note might be different from the original.   Formatting of this note might be different from the original.   TTE 2022 w/o change   F/u with cardiology for serial monitoring    Cervical lesion 03/20/2014    Fuchs' corneal dystrophy     Lyme disease     OSTEOARTHRITIS     Post-operative nausea and vomiting     pt states she often wakes up during surgery    PTSD (post-traumatic stress disorder)     9/11    Rotator cuff injury               Past Surgical History:   Procedure Laterality Date    BENIGN BIOPSY LEFT Left 1993    CARPAL TUNNEL RELEASE      EYE SURGERY      FOOT SURGERY      HAND/FINGER SURGERY UNLISTED Right 10/19/20--Dr. Paulo Shay    ring finger A1 pulley release/trigger finger release    BRETT LOCALIZATION WIRE 1 SITE LEFT (CPT=19281)      1990's bn    OTHER  1994    OTHER ACCESSORY      dissected LAD, double stented 8/11/23    OTHER SURGICAL HISTORY      A RCR RIGHT SHOULDER     OTHER SURGICAL HISTORY      TRIGGER RELEASE RIGHT THUMB    OTHER SURGICAL HISTORY      CTR BILATERAL       OTHER SURGICAL HISTORY      NEUROMA REMOVED FOOT     REPAIR ROTATOR CUFF,ACUTE                  Social History     Socioeconomic History    Marital status: Life Partner    Number of children: 1    Years of education: 17   Occupational History    Occupation: business      Comment: [employer]    Occupation: unemployed now   Tobacco Use    Smoking status: Former     Packs/day: 1.00     Years: 2.00     Additional pack years: 0.00     Total pack years: 2.00     Types: Cigarettes     Quit date: 1974     Years since quittin.8    Smokeless tobacco: Never   Vaping Use    Vaping Use: Never used   Substance and Sexual Activity    Alcohol use: Not Currently     Comment: occasional    Drug use: No    Sexual activity: Yes     Partners: Female, Male     Comment: patient has one child    Other Topics Concern    Seat Belt Yes   Social History Narrative    ** Merged History Encounter **                   Review of Systems    Positive for stated complaint: Leg Pain, Swelling  Other systems are as noted in HPI.  Constitutional and vital signs reviewed.      All other systems reviewed and negative except as noted above.    Physical Exam     ED Triage Vitals [24 1046]   /68   Pulse 84   Resp 18   Temp 98.3 °F (36.8 °C)   Temp src Temporal   SpO2 97 %   O2 Device None (Room air)       Current:/68   Pulse 84   Temp 98.3 °F (36.8 °C) (Temporal)   Resp 18   Ht 160 cm (5' 3\")   Wt 82.6 kg   LMP  (LMP Unknown)   SpO2 97%   BMI 32.24 kg/m²         Physical Exam  Vitals and nursing note reviewed.   Constitutional:       General: She is not in acute distress.     Appearance: Normal appearance. She is well-developed. She is not diaphoretic.   HENT:      Head: Normocephalic and atraumatic.   Cardiovascular:      Rate and Rhythm: Normal rate and regular rhythm.   Pulmonary:      Effort: Pulmonary effort is normal.      Breath sounds: Normal breath sounds.   Musculoskeletal:       Cervical back: Normal range of motion.      Right hip: Normal.      Left hip: Normal.      Right upper leg: Normal.      Left upper leg: Normal.      Left knee: Swelling present. No bony tenderness or crepitus. Normal range of motion. Tenderness present over the medial joint line. No MCL, LCL or ACL tenderness. No LCL laxity, MCL laxity or ACL laxity.     Instability Tests: Anterior drawer test negative. Posterior drawer test negative.      Right lower leg: Normal.      Left lower leg: Tenderness present. No swelling. No edema.      Left foot: Normal.      Comments: Negative Homans sign bilaterally    Skin:     General: Skin is warm and dry.      Coloration: Skin is not pale.      Findings: No erythema or rash.   Neurological:      Mental Status: She is alert and oriented to person, place, and time.      Motor: No abnormal muscle tone.      Coordination: Coordination normal.      Deep Tendon Reflexes: Reflexes are normal and symmetric.   Psychiatric:         Behavior: Behavior normal.         Thought Content: Thought content normal.         Judgment: Judgment normal.             ED Course   Labs Reviewed - No data to display     XR KNEE (3 VIEWS), LEFT (CPT=73562)    Result Date: 3/4/2024  PROCEDURE:  XR KNEE ROUTINE (3 VIEWS), LEFT (CPT=73562)  TECHNIQUE:  Three views were obtained including patellar view.  COMPARISON:  None.  INDICATIONS:  Medial knee pain  PATIENT STATED HISTORY: (As transcribed by Technologist)  Patient states swelling throughout knee and medial pain since the 21st of Feburary after hopping off a train. No specific injury. Pain radiates at times down to her left foot and up the left leg.                CONCLUSION:   Negative for fracture or malalignment.  Normal mineralization.  Joint spaces are preserved.  No patellar subluxation.  No joint effusion or focal soft tissue swelling.     LOCATION:  [id number]   Dictated by (CST): Salma Carrera MD on 3/04/2024 at 12:33 PM     Finalized by (CST): Debi  MD Salma on 3/04/2024 at 12:33 PM       US VENOUS DOPPLER LEG LEFT - DIAG IMG (CPT=93971)    Result Date: 3/4/2024  PROCEDURE:  US VENOUS DOPPLER LEG LEFT - DIAG IMG (CPT=93971)  COMPARISON:  None.  INDICATIONS:  Leg Pain, Swelling  TECHNIQUE:  Real time, grey scale, and duplex ultrasound was used to evaluate the lower extremity venous system. B-mode two-dimensional images of the vascular structures, Doppler spectral analysis, and color flow.  Doppler imaging were performed.  The following veins were imaged:  Common, deep, and superficial femoral, popliteal, sapheno-femoral junction, posterior tibial veins, and the contralateral common femoral vein.  PATIENT STATED HISTORY: (As transcribed by Technologist)     FINDINGS:  EXTREMITY EXAMINED:  Left lower extremity. SAPHENOFEMORAL JUNCTION:  No reflux. THROMBI:  None visible. COMPRESSION:  Normal compressibility, phasicity, and augmentation. OTHER:  Negative.            CONCLUSION:  Negative exam, no evidence of left lower extremity DVT.   LOCATION:  [facility]    Dictated by (CST): Taylor Espana DO on 3/04/2024 at 11:39 AM     Finalized by (CST): Taylor Espana DO on 3/04/2024 at 11:39 AM             MDM             Medical Decision Making  68-year-old female with left lower extremity pain mostly around the knee but is having some swelling and pain in the calf and the thigh.  Denies shortness of breath or chest pain.  Has appointment with cardiology today.  Had a cortisone injection by rheumatology and is on prednisone for her PMR from rheumatology    Problems Addressed:  Acute pain of left knee: acute illness or injury    Amount and/or Complexity of Data Reviewed  Radiology: ordered and independent interpretation performed. Decision-making details documented in ED Course.     Details: I personally reviewed US report no evidence of DVT in left lower extremity     I personally reviewed the patients xray and no evidence of fracture or dislocation    Risk  OTC drugs.  Risk  Details: Clinical Impression: Left knee pain      The differential diagnosis before testing included left knee sprain, DVT, knee fracture, which is a medical condition that poses a threat to life/function.        a neoprene knee brace, discussed RICE instructions and Tylenol close follow-up          Disposition and Plan     Clinical Impression:  1. Acute pain of left knee         Disposition:  Discharge  3/4/2024 12:41 pm    Follow-up:  aKylen Markham PA  [address] [place] [id number]  [phone]    Schedule an appointment as soon as possible for a visit in 1 week            Medications Prescribed:  Discharge Medication List as of 3/4/2024 12:53 PM            This report has been produced using speech recognition software and may contain errors related to that system including, but not limited to, errors in grammar, punctuation, and spelling, as well as words and phrases that possibly may have been recognized inappropriately.  If there are any questions or concerns, contact the dictating provider for clarification.     NOTE: The 21st Century Cares Act makes medical notes available to patients.  Be advised that this is a medical document written in medical language and may contain abbreviations or verbiage that is unfamiliar or direct.  It is primarily intended to carry relevant historical information, physical exam findings, and the clinical assessment of the physician.

## 2024-03-21 ENCOUNTER — TELEPHONE (OUTPATIENT)
Dept: FAMILY MEDICINE CLINIC | Facility: CLINIC | Age: 69
End: 2024-03-21
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
## 2024-04-12 ENCOUNTER — TELEPHONE (OUTPATIENT)
Dept: FAMILY MEDICINE CLINIC | Facility: CLINIC | Age: 69
End: 2024-04-12

## 2024-04-12 NOTE — TELEPHONE ENCOUNTER
Pt informed of below and voiced understanding and agreed with plan.    Dr. Kenia Rand  [address] [address] [phone]

## 2024-04-12 NOTE — TELEPHONE ENCOUNTER
Pt has a cyst in mid-spine, in bra line area. Area of lump is uncomfortable but pt denies pain. Pt scheduled with dermatologist, RADHA Simons for Dr. Zamudio. Pt was informed that clinic is all booked out and was told to find another MD. Pt not happy with services for Dr. Zamudio's office. Pt needs I&D of the cysts. Pt wants to know if you have other recommendations or pt wants to know if you can see her to assess the cyst.

## 2024-04-12 NOTE — TELEPHONE ENCOUNTER
If it is overlying the spine I would prefer patient to see dermatology, can see Dr. Kenia Rand with [facility] as an alternative. Thank you.

## 2024-04-18 ENCOUNTER — PATIENT MESSAGE (OUTPATIENT)
Dept: FAMILY MEDICINE CLINIC | Facility: CLINIC | Age: 69
End: 2024-04-18

## 2024-04-18 NOTE — TELEPHONE ENCOUNTER
From: Katelyn Gutierrez  To: Kenia Lamar  Sent: 4/18/2024 9:20 AM CDT  Subject: 3 Blood tests by May 8th    Do I need to fast prior to the 3 blood panels ordered?    Katelyn Gutierrez  [phone]

## 2024-05-02 ENCOUNTER — TELEPHONE (OUTPATIENT)
Dept: INTERNAL MEDICINE CLINIC | Facility: CLINIC | Age: 69
End: 2024-05-02

## 2024-05-02 NOTE — TELEPHONE ENCOUNTER
Denied    Payer: KEESHA Gustavo Case ID: [id number]    [phone]    [phone]  Electronic appeal: Supported  View History  Medication Being Authorized    semaglutide-weight management 0.25 MG/0.5ML Subcutaneous Solution Auto-injector  Inject 0.5 mL (0.25 mg total) into the skin once a week for 4 doses.  Dispense: 2 mL Refills: 0   Start: 4/18/2024 End: 5/10/2024   Class: Normal   This order has been released to its destination.  To be filled at: [facility]/pharm[facility] #[id number] - [place], [place] - [address] [phone], [phone]

## 2024-05-04 ENCOUNTER — TELEPHONE (OUTPATIENT)
Dept: INTERNAL MEDICINE CLINIC | Facility: CLINIC | Age: 69
End: 2024-05-04

## 2024-05-04 DIAGNOSIS — Z15.89 MTHFR GENE MUTATION: Primary | ICD-10-CM

## 2024-05-06 RX ORDER — LEVOMEFOLATE CALCIUM 15 MG
15 TABLET ORAL DAILY
Qty: 90 TABLET | Refills: 3 | Status: SHIPPED | OUTPATIENT
Start: 2024-05-06

## 2024-05-08 ENCOUNTER — LAB ENCOUNTER (OUTPATIENT)
Dept: LAB | Age: 69
End: 2024-05-08
Attending: STUDENT IN AN ORGANIZED HEALTH CARE EDUCATION/TRAINING PROGRAM
Payer: MEDICARE

## 2024-05-08 DIAGNOSIS — R73.9 ELEVATED BLOOD SUGAR: ICD-10-CM

## 2024-05-08 DIAGNOSIS — M79.10 MYALGIA DUE TO STATIN: ICD-10-CM

## 2024-05-08 DIAGNOSIS — T46.6X5A MYALGIA DUE TO STATIN: ICD-10-CM

## 2024-05-08 DIAGNOSIS — R79.89 ELEVATED TSH: ICD-10-CM

## 2024-05-08 LAB
ALBUMIN SERPL-MCNC: 3.5 G/DL (ref 3.4–5)
ALP LIVER SERPL-CCNC: 71 U/L
ALT SERPL-CCNC: 29 U/L
AST SERPL-CCNC: 32 U/L (ref 15–37)
BILIRUB DIRECT SERPL-MCNC: 0.2 MG/DL (ref 0–0.2)
BILIRUB SERPL-MCNC: 0.6 MG/DL (ref 0.1–2)
EST. AVERAGE GLUCOSE BLD GHB EST-MCNC: 128 MG/DL (ref 68–126)
HBA1C MFR BLD: 6.1 % (ref ?–5.7)
PROT SERPL-MCNC: 6.8 G/DL (ref 6.4–8.2)
T4 FREE SERPL-MCNC: 0.8 NG/DL (ref 0.8–1.7)
TSI SER-ACNC: 1.58 MIU/ML (ref 0.36–3.74)

## 2024-05-08 PROCEDURE — 80076 HEPATIC FUNCTION PANEL: CPT

## 2024-05-08 PROCEDURE — 84443 ASSAY THYROID STIM HORMONE: CPT

## 2024-05-08 PROCEDURE — 83036 HEMOGLOBIN GLYCOSYLATED A1C: CPT

## 2024-05-08 PROCEDURE — 84439 ASSAY OF FREE THYROXINE: CPT

## 2024-05-09 ENCOUNTER — MED REC SCAN ONLY (OUTPATIENT)
Dept: FAMILY MEDICINE CLINIC | Facility: CLINIC | Age: 69
End: 2024-05-09

## 2024-05-29 ENCOUNTER — OFFICE VISIT (OUTPATIENT)
Dept: FAMILY MEDICINE CLINIC | Facility: CLINIC | Age: 69
End: 2024-05-29

## 2024-05-29 VITALS
RESPIRATION RATE: 16 BRPM | HEART RATE: 84 BPM | DIASTOLIC BLOOD PRESSURE: 80 MMHG | WEIGHT: 189 LBS | HEIGHT: 62.5 IN | TEMPERATURE: 97 F | BODY MASS INDEX: 33.91 KG/M2 | SYSTOLIC BLOOD PRESSURE: 138 MMHG

## 2024-05-29 DIAGNOSIS — Z12.11 SCREENING FOR COLON CANCER: ICD-10-CM

## 2024-05-29 DIAGNOSIS — I70.0 THORACIC AORTA ATHEROSCLEROSIS (HCC): ICD-10-CM

## 2024-05-29 DIAGNOSIS — Z78.0 POST-MENOPAUSAL: ICD-10-CM

## 2024-05-29 DIAGNOSIS — D68.69 SECONDARY HYPERCOAGULABLE STATE (HCC): ICD-10-CM

## 2024-05-29 DIAGNOSIS — H18.513 FUCHS' CORNEAL DYSTROPHY OF BOTH EYES: ICD-10-CM

## 2024-05-29 DIAGNOSIS — Z00.00 ENCOUNTER FOR MEDICARE ANNUAL WELLNESS EXAM: Primary | ICD-10-CM

## 2024-05-29 DIAGNOSIS — Z12.31 ENCOUNTER FOR SCREENING MAMMOGRAM FOR MALIGNANT NEOPLASM OF BREAST: ICD-10-CM

## 2024-05-29 DIAGNOSIS — R73.03 PREDIABETES: ICD-10-CM

## 2024-05-29 DIAGNOSIS — M35.3 PMR (POLYMYALGIA RHEUMATICA) (HCC): ICD-10-CM

## 2024-05-29 DIAGNOSIS — I10 HYPERTENSION, UNSPECIFIED TYPE: ICD-10-CM

## 2024-05-29 DIAGNOSIS — E66.9 CLASS 1 OBESITY WITH SERIOUS COMORBIDITY AND BODY MASS INDEX (BMI) OF 34.0 TO 34.9 IN ADULT, UNSPECIFIED OBESITY TYPE: ICD-10-CM

## 2024-05-29 DIAGNOSIS — Z11.59 NEED FOR HEPATITIS C SCREENING TEST: ICD-10-CM

## 2024-05-29 PROBLEM — F32.0 CURRENT MILD EPISODE OF MAJOR DEPRESSIVE DISORDER WITHOUT PRIOR EPISODE (HCC): Status: RESOLVED | Noted: 2022-10-06 | Resolved: 2024-05-29

## 2024-05-29 PROBLEM — F32.0 CURRENT MILD EPISODE OF MAJOR DEPRESSIVE DISORDER WITHOUT PRIOR EPISODE: Status: RESOLVED | Noted: 2022-10-06 | Resolved: 2024-05-29

## 2024-05-29 PROCEDURE — G0439 PPPS, SUBSEQ VISIT: HCPCS | Performed by: STUDENT IN AN ORGANIZED HEALTH CARE EDUCATION/TRAINING PROGRAM

## 2024-05-29 PROCEDURE — 99213 OFFICE O/P EST LOW 20 MIN: CPT | Performed by: STUDENT IN AN ORGANIZED HEALTH CARE EDUCATION/TRAINING PROGRAM

## 2024-05-29 RX ORDER — GARLIC EXTRACT 500 MG
1 CAPSULE ORAL DAILY
COMMUNITY

## 2024-05-29 RX ORDER — MULTIVITAMIN
TABLET ORAL
COMMUNITY

## 2024-05-29 RX ORDER — METHOTREXATE 2.5 MG/1
2.5 TABLET ORAL WEEKLY
COMMUNITY
Start: 2024-05-09
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
## 2024-05-29 NOTE — PATIENT INSTRUCTIONS
Katelyn Gutierrez's SCREENING SCHEDULE   Tests on this list are recommended by your physician but may not be covered, or covered at this frequency, by your insurer.   Please check with your insurance carrier before scheduling to verify coverage.   PREVENTATIVE SERVICES FREQUENCY &  COVERAGE DETAILS LAST COMPLETION DATE   Diabetes Screening    Fasting Blood Sugar /  Glucose    One screening every 12 months if never tested or if previously tested but not diagnosed with pre-diabetes   One screening every 6 months if diagnosed with pre-diabetes Lab Results   Component Value Date    GLU 91 02/02/2024        Cardiovascular Disease Screening    Lipid Panel  Cholesterol  Lipoprotein (HDL)  Triglycerides Covered every 5 years for all Medicare beneficiaries without apparent signs or symptoms of cardiovascular disease Lab Results   Component Value Date    CHOLEST 292.00 (H) 08/31/2020    HDL 60.1 08/31/2020     (H) 08/31/2020    TRIG 115.00 08/31/2020         Electrocardiogram (EKG)   Covered if needed at Welcome to Medicare, and non-screening if indicated for medical reasons 08/12/2023      Ultrasound Screening for Abdominal Aortic Aneurysm (AAA) Covered once in a lifetime for one of the following risk factors    Men who are 65-75 years old and have ever smoked    Anyone with a family history -     Colorectal Cancer Screening  Covered for ages 50-85; only need ONE of the following:    Colonoscopy   Covered every 10 years    Covered every 2 years if patient is at high risk or previous colonoscopy was abnormal -    Health Maintenance   Topic Date Due    Colorectal Cancer Screening  Never done       Flexible Sigmoidoscopy   Covered every 4 years -    Fecal Occult Blood Test Covered annually -   Bone Density Screening    Bone density screening    Covered every 2 years after age 65 if diagnosed with risk of osteoporosis or estrogen deficiency.    Covered yearly for long-term glucocorticoid medication use (Steroids) Last  Dexa Scan:    XR DEXA BONE DENSITY AXIAL (CPT=77080) 09/01/2020      No recommendations at this time   Pap and Pelvic    Pap   Covered every 2 years for women at normal risk; Annually if at high risk 08/31/2020  No recommendations at this time    Chlamydia Annually if high risk -  No recommendations at this time   Screening Mammogram    Mammogram     Recommend annually for all female patients aged 40 and older    One baseline mammogram covered for patients aged 35-39 09/27/2023    Health Maintenance   Topic Date Due    Mammogram  09/27/2024       Immunizations    Influenza Covered once per flu season  Please get every year 12/19/2023  No recommendations at this time    Pneumococcal Each vaccine ([id number] & [id number]) covered once after 65 Prevnar 13: -    [id number]: 10/08/2020     No recommendations at this time    Hepatitis B One screening covered for patients with certain risk factors   -  No recommendations at this time    Tetanus Toxoid Not covered by Medicare Part B unless medically necessary (cut with metal); may be covered with your pharmacy prescription benefits -    Tetanus, Diptheria and Pertusis TD and TDaP Not covered by Medicare Part B -  No recommendations at this time    Zoster Not covered by Medicare Part B; may be covered with your pharmacy  prescription benefits -  Zoster Vaccines(1 of 2) Never done     Annual Monitoring of Persistent Medications (ACE/ARB, digoxin diuretics, anticonvulsants)    Potassium Annually Lab Results   Component Value Date    K 4.9 02/02/2024         Creatinine   Annually Lab Results   Component Value Date    CREATSERUM 0.82 02/02/2024         BUN Annually Lab Results   Component Value Date    BUN 17 02/02/2024       Drug Serum Conc Annually No results found for: \"DIGOXIN\", \"DIG\", \"VALP\"           As of March 1, 2024, the CDC noted that \"people ages 65 years and older should receive 1 additional dose of any updated (0422-8251 Formula) COVID-19 vaccine (i.e., Moderna,  Novavax, Pfizer-BioNTech) at least 4 months following the previous dose of updated (4073-5170 Formula) COVID-19 vaccine. \"      Information about healthcare power of     [email or web address]    [email or web address]

## 2024-05-31 ENCOUNTER — LAB ENCOUNTER (OUTPATIENT)
Dept: LAB | Age: 69
End: 2024-05-31
Attending: STUDENT IN AN ORGANIZED HEALTH CARE EDUCATION/TRAINING PROGRAM
Payer: MEDICARE

## 2024-05-31 DIAGNOSIS — R73.03 PREDIABETES: ICD-10-CM

## 2024-05-31 DIAGNOSIS — I10 HYPERTENSION, UNSPECIFIED TYPE: ICD-10-CM

## 2024-05-31 DIAGNOSIS — Z11.59 NEED FOR HEPATITIS C SCREENING TEST: ICD-10-CM

## 2024-05-31 LAB
CHOLEST SERPL-MCNC: 212 MG/DL (ref ?–200)
EST. AVERAGE GLUCOSE BLD GHB EST-MCNC: 126 MG/DL (ref 68–126)
FASTING PATIENT LIPID ANSWER: YES
HBA1C MFR BLD: 6 % (ref ?–5.7)
HCV AB SERPL QL IA: NONREACTIVE
HDLC SERPL-MCNC: 81 MG/DL (ref 40–59)
LDLC SERPL CALC-MCNC: 109 MG/DL (ref ?–100)
NONHDLC SERPL-MCNC: 131 MG/DL (ref ?–130)
TRIGL SERPL-MCNC: 130 MG/DL (ref 30–149)
VLDLC SERPL CALC-MCNC: 22 MG/DL (ref 0–30)

## 2024-05-31 PROCEDURE — 86803 HEPATITIS C AB TEST: CPT

## 2024-05-31 PROCEDURE — 80061 LIPID PANEL: CPT

## 2024-05-31 PROCEDURE — 83036 HEMOGLOBIN GLYCOSYLATED A1C: CPT

## 2024-06-09 ENCOUNTER — HOSPITAL ENCOUNTER (EMERGENCY)
Facility: HOSPITAL | Age: 69
Discharge: HOME | End: 2024-06-09
Attending: EMERGENCY MEDICINE
Payer: MEDICARE

## 2024-06-09 ENCOUNTER — APPOINTMENT (OUTPATIENT)
Dept: CT IMAGING | Facility: HOSPITAL | Age: 69
End: 2024-06-09
Attending: EMERGENCY MEDICINE
Payer: MEDICARE

## 2024-06-09 ENCOUNTER — APPOINTMENT (OUTPATIENT)
Dept: MRI IMAGING | Facility: HOSPITAL | Age: 69
End: 2024-06-09
Attending: EMERGENCY MEDICINE
Payer: MEDICARE

## 2024-06-09 VITALS
BODY MASS INDEX: 32.6 KG/M2 | WEIGHT: 184 LBS | DIASTOLIC BLOOD PRESSURE: 83 MMHG | HEART RATE: 63 BPM | HEIGHT: 63 IN | OXYGEN SATURATION: 98 % | SYSTOLIC BLOOD PRESSURE: 149 MMHG | RESPIRATION RATE: 20 BRPM | TEMPERATURE: 97 F

## 2024-06-09 DIAGNOSIS — R20.2 PARESTHESIAS: ICD-10-CM

## 2024-06-09 DIAGNOSIS — R55 SYNCOPE, NEAR: Primary | ICD-10-CM

## 2024-06-09 LAB
ANION GAP SERPL CALC-SCNC: 5 MMOL/L (ref 0–18)
BASOPHILS # BLD AUTO: 0.02 X10(3) UL (ref 0–0.2)
BASOPHILS NFR BLD AUTO: 0.4 %
BUN BLD-MCNC: 13 MG/DL (ref 9–23)
BUN/CREAT SERPL: 14.3 (ref 10–20)
CALCIUM BLD-MCNC: 9.6 MG/DL (ref 8.7–10.4)
CHLORIDE SERPL-SCNC: 108 MMOL/L (ref 98–112)
CO2 SERPL-SCNC: 28 MMOL/L (ref 21–32)
CREAT BLD-MCNC: 0.91 MG/DL
DEPRECATED RDW RBC AUTO: 45.2 FL (ref 35.1–46.3)
EGFRCR SERPLBLD CKD-EPI 2021: 68 ML/MIN/1.73M2 (ref 60–?)
EOSINOPHIL # BLD AUTO: 0.19 X10(3) UL (ref 0–0.7)
EOSINOPHIL NFR BLD AUTO: 3.6 %
ERYTHROCYTE [DISTWIDTH] IN BLOOD BY AUTOMATED COUNT: 13.2 % (ref 11–15)
GLUCOSE BLD-MCNC: 87 MG/DL (ref 70–99)
HCT VFR BLD AUTO: 36.8 %
HGB BLD-MCNC: 12.3 G/DL
IMM GRANULOCYTES # BLD AUTO: 0.01 X10(3) UL (ref 0–1)
IMM GRANULOCYTES NFR BLD: 0.2 %
LYMPHOCYTES # BLD AUTO: 1.62 X10(3) UL (ref 1–4)
LYMPHOCYTES NFR BLD AUTO: 31 %
MCH RBC QN AUTO: 31.6 PG (ref 26–34)
MCHC RBC AUTO-ENTMCNC: 33.4 G/DL (ref 31–37)
MCV RBC AUTO: 94.6 FL
MONOCYTES # BLD AUTO: 0.45 X10(3) UL (ref 0.1–1)
MONOCYTES NFR BLD AUTO: 8.6 %
NEUTROPHILS # BLD AUTO: 2.94 X10 (3) UL (ref 1.5–7.7)
NEUTROPHILS # BLD AUTO: 2.94 X10(3) UL (ref 1.5–7.7)
NEUTROPHILS NFR BLD AUTO: 56.2 %
OSMOLALITY SERPL CALC.SUM OF ELEC: 291 MOSM/KG (ref 275–295)
PLATELET # BLD AUTO: 248 10(3)UL (ref 150–450)
POTASSIUM SERPL-SCNC: 3.9 MMOL/L (ref 3.5–5.1)
RBC # BLD AUTO: 3.89 X10(6)UL
SODIUM SERPL-SCNC: 141 MMOL/L (ref 136–145)
TROPONIN I SERPL HS-MCNC: 10 NG/L
WBC # BLD AUTO: 5.2 X10(3) UL (ref 4–11)

## 2024-06-09 PROCEDURE — 36415 COLL VENOUS BLD VENIPUNCTURE: CPT

## 2024-06-09 PROCEDURE — 80048 BASIC METABOLIC PNL TOTAL CA: CPT | Performed by: EMERGENCY MEDICINE

## 2024-06-09 PROCEDURE — 99285 EMERGENCY DEPT VISIT HI MDM: CPT

## 2024-06-09 PROCEDURE — 93010 ELECTROCARDIOGRAM REPORT: CPT

## 2024-06-09 PROCEDURE — 93005 ELECTROCARDIOGRAM TRACING: CPT

## 2024-06-09 PROCEDURE — 70450 CT HEAD/BRAIN W/O DYE: CPT | Performed by: EMERGENCY MEDICINE

## 2024-06-09 PROCEDURE — 85025 COMPLETE CBC W/AUTO DIFF WBC: CPT | Performed by: EMERGENCY MEDICINE

## 2024-06-09 PROCEDURE — 84484 ASSAY OF TROPONIN QUANT: CPT | Performed by: EMERGENCY MEDICINE

## 2024-06-09 PROCEDURE — 70551 MRI BRAIN STEM W/O DYE: CPT | Performed by: EMERGENCY MEDICINE

## 2024-06-09 NOTE — ED INITIAL ASSESSMENT (HPI)
Pt arrives ambulatory to ED for c/o numbness in bottom L half of her lip radiating to her neck accompanied by intermittent Has starting at 3pm. Pt states she was also c/o SOB today while she was playing mini golf. Denies hx of stroke. +blood thinners. Aox4, speaking in full sentences.     -FAST exam. No stroke alert at this time, per Owen FRANCIS.

## 2024-06-10 LAB
ATRIAL RATE: 73 BPM
P AXIS: 53 DEGREES
P-R INTERVAL: 148 MS
Q-T INTERVAL: 404 MS
QRS DURATION: 80 MS
QTC CALCULATION (BEZET): 445 MS
R AXIS: 44 DEGREES
T AXIS: 58 DEGREES
VENTRICULAR RATE: 73 BPM

## 2024-06-10 NOTE — ED PROVIDER NOTES
Patient Seen in: [facility] Emergency Department    History     Chief Complaint   Patient presents with    Numbness    Headache    Shortness Of Breath       HPI    The patient presents to the ED complaining of numbness to her left lip that radiates to her cheek and neck starting around 3 PM today.  She also states that she was playing mini golf today with her family became dizzy and lightheaded and thought that she would pass out.  Recovered after sitting on a bench for some time.  Denies other complaints.    History reviewed.   Past Medical History:    Abnormal pelvic exam    Bicuspid aortic valve (HCC)    Formatting of this note might be different from the original.   Formatting of this note might be different from the original.   TTE 2022 w/o change   F/u with cardiology for serial monitoring    Cervical lesion    Current mild episode of major depressive disorder without prior episode (HCC)    Formatting of this note might be different from the original.   Mood is improved.  Stable with therapy.   No si/hi.   PTSD, EMDR.   No pharmacologic rx  Formatting of this note might be different from the original.   Formatting of this note might be different from the original.   Mood is improved.  Stable with therapy.   No si/hi.   PTSD, EMDR.   No pharmacologic rx      Fuchs' corneal dystrophy    Lyme disease    OSTEOARTHRITIS    Post-operative nausea and vomiting    pt states she often wakes up during surgery    PTSD (post-traumatic stress disorder)    9/11    Rotator cuff injury       History reviewed.   Past Surgical History:   Procedure Laterality Date    Benign biopsy left Left 1993    Carpal tunnel release      Eye surgery      Foot surgery      Hand/finger surgery unlisted Right 10/19/20--Dr. Paulo Shay    ring finger A1 pulley release/trigger finger release    Jose localization wire 1 site left (cpt=19281)      1990's bn    Other  1994    Other accessory      dissected LAD, double stented 8/11/23     Other surgical history      A RCR RIGHT SHOULDER     Other surgical history      TRIGGER RELEASE RIGHT THUMB    Other surgical history      CTR BILATERAL      Other surgical history      NEUROMA REMOVED FOOT     Repair rotator cuff,acute           Medications :  (Not in a hospital admission)       Family History   Problem Relation Age of Onset    Heart Disorder Father     Depression Sister     Kidney Disease Sister     Heart Disorder Maternal Grandfather     Heart Disorder Paternal Grandfather     Breast Cancer Paternal Aunt 70        Dx Breast CA age 70    Dementia Mother     Alcohol and Other Disorders Associated Mother     Bleeding Disorders Neg     Colon Cancer Neg     Ovarian Cancer Neg        Smoking Status:   Social History     Socioeconomic History    Marital status: Life Partner    Number of children: 1    Years of education: 17   Occupational History    Occupation: business      Comment: [employer]    Occupation: unemployed now   Tobacco Use    Smoking status: Former     Current packs/day: 0.00     Average packs/day: 1 pack/day for 2.0 years (2.0 ttl pk-yrs)     Types: Cigarettes     Start date: 1972     Quit date: 1974     Years since quittin.0    Smokeless tobacco: Never   Vaping Use    Vaping status: Never Used   Substance and Sexual Activity    Alcohol use: Yes     Comment: occasional    Drug use: No    Sexual activity: Yes     Partners: Female, Male     Comment: patient has one child    Other Topics Concern    Caffeine Concern Yes     Comment: 1 c. coffee in the morning    Exercise Yes     Comment: 1-3 miles/day    Seat Belt Yes       Constitutional and vital signs reviewed.      Social History and Family History elements reviewed from today, pertinent positives to the presenting problem noted.    Physical Exam     ED Triage Vitals [24 1604]   BP (!) 168/82   Pulse 69   Resp 18   Temp 97.3 °F (36.3 °C)   Temp src Temporal   SpO2 97 %   O2 Device  None (Room air)       All measures to prevent infection transmission during my interaction with the patient were taken. Handwashing was performed prior to and after the exam.  Stethoscope and any equipment used during my examination was cleaned with super sani-cloth germicidal wipes following the exam.     Physical Exam  Vitals and nursing note reviewed.   Constitutional:       General: She is not in acute distress.     Appearance: She is well-developed. She is not ill-appearing or toxic-appearing.   HENT:      Head: Normocephalic and atraumatic.   Eyes:      General: No visual field deficit.        Right eye: No discharge.         Left eye: No discharge.      Conjunctiva/sclera: Conjunctivae normal.   Neck:      Trachea: No tracheal deviation.   Cardiovascular:      Rate and Rhythm: Normal rate.   Pulmonary:      Effort: Pulmonary effort is normal. No respiratory distress.      Breath sounds: No stridor.   Abdominal:      General: There is no distension.      Palpations: Abdomen is soft.   Musculoskeletal:         General: No deformity.   Skin:     General: Skin is warm and dry.   Neurological:      Mental Status: She is alert and oriented to person, place, and time.      Cranial Nerves: No cranial nerve deficit, dysarthria or facial asymmetry.      Sensory: No sensory deficit.      Motor: No weakness.      Coordination: Romberg sign negative. Coordination normal.      Gait: Gait normal.   Psychiatric:         Mood and Affect: Mood normal.         Behavior: Behavior normal.         ED Course        Labs Reviewed   BASIC METABOLIC PANEL (8) - Normal   TROPONIN I HIGH SENSITIVITY - Normal   CBC WITH DIFFERENTIAL WITH PLATELET    Narrative:     The following orders were created for panel order CBC With Differential With Platelet.                  Procedure                               Abnormality         Status                                     ---------                               -----------         ------                                      CBC W/ DIFFERENTIAL[[id number]]                              Final result                                                 Please view results for these tests on the individual orders.   CBC W/ DIFFERENTIAL     EKG    Rate, intervals and axes as noted on EKG Report.  Rate: Normal, 73 bpm  Rhythm: Sinus Rhythm  Reading: Normal intervals, normal ST segments, normal EKG           As Interpreted by me    Imaging Results Available and Reviewed while in ED: MRI BRAIN WO ACUTE (3) SEQUENCE (CPT=70551)    Result Date: 6/9/2024  CONCLUSION:   No acute intracranial abnormality.  Previously described small focus of hypoattenuation involving the anterior limb of the left internal capsule on the head CT dated 06/09/2024 likely corresponds to a small dilated perivascular space.  Lesser incidental findings described above.    Dictated by (CST): Matheus Madera MD on 6/09/2024 at 8:44 PM     Finalized by (CST): Matheus Madera MD on 6/09/2024 at 8:48 PM          CT BRAIN OR HEAD (92092)    Result Date: 6/9/2024  CONCLUSION:   Small focus of hypoattenuation involving the anterior limb of the left internal capsule, which reflects age-indeterminate ischemic change.  Recommend further evaluation with an MRI brain.  Mild global parenchymal volume loss.  Intracranial atherosclerotic disease.  Lesser incidental findings described above.    Dictated by (CST): Matheus Madera MD on 6/09/2024 at 6:20 PM     Finalized by (CST): Matheus Madera MD on 6/09/2024 at 6:27 PM         ED Medications Administered: Medications - No data to display      MDM     Vitals:    06/09/24 1604 06/09/24 1830 06/09/24 1915 06/09/24 2100   BP: (!) 168/82 (!) 170/84 160/77 149/83   Pulse: 69 57 62 63   Resp: 18  18 20   Temp: 97.3 °F (36.3 °C)      TempSrc: Temporal      SpO2: 97% 96% 99% 98%   Weight: 83.5 kg      Height: 160 cm (5' 3\")        *I personally reviewed and interpreted all ED vitals.    Pulse Ox: 98%, Room air, Normal      Monitor Interpretation:   normal sinus rhythm as interpreted by me.  The cardiac monitor was ordered to monitor heart rate.    Differential Diagnosis/ Diagnostic Considerations: Near syncope, stroke, ICH, other    Complicating Factors: The patient already has does not have any pertinent problems on file. to contribute to the complexity of this ED evaluation.    Medical Decision Making  The patient presents to the ED after reporting a near syncopal event while playing mini golf today in the heat.  She now feels improved and denies any chest pain or palpitations however has some mild tingling to her left lip and cheek.  Nondistressed on examination and otherwise neurologically intact.  CT brain with possible abnormality.  MRI obtained which shows no acute abnormalities.  Laboratory testing without abnormality and patient feeling well on reevaluation.  Low risk on [place] syncope rule.  She feels comfortable at home at this time will follow-up with her doctor outpatient.    Problems Addressed:  Paresthesias: acute illness or injury that poses a threat to life or bodily functions  Syncope, near: acute illness or injury that poses a threat to life or bodily functions    Amount and/or Complexity of Data Reviewed  Labs: ordered. Decision-making details documented in ED Course.  Radiology: ordered and independent interpretation performed. Decision-making details documented in ED Course.     Details: I personally reviewed the patient's CT brain images and noted no ICH  ECG/medicine tests: ordered and independent interpretation performed. Decision-making details documented in ED Course.        Condition upon leaving the department: Stable    Disposition and Plan     Clinical Impression:  1. Syncope, near    2. Paresthesias        Disposition:  Discharge    Follow-up:  Kenia Lamar MD  [address] [place] [id number]  [phone]    Schedule an appointment as soon as possible for a visit in 3  day(s)        Medications Prescribed:  Discharge Medication List as of 6/9/2024  9:00 PM

## 2024-06-17 ENCOUNTER — OFFICE VISIT (OUTPATIENT)
Dept: DERMATOLOGY CLINIC | Facility: CLINIC | Age: 69
End: 2024-06-17

## 2024-06-17 DIAGNOSIS — L30.4 INTERTRIGO: ICD-10-CM

## 2024-06-17 DIAGNOSIS — L72.0 EPIDERMAL CYST: Primary | ICD-10-CM

## 2024-06-17 DIAGNOSIS — L82.1 SK (SEBORRHEIC KERATOSIS): ICD-10-CM

## 2024-06-17 DIAGNOSIS — D23.9 BENIGN NEOPLASM OF SKIN, UNSPECIFIED LOCATION: ICD-10-CM

## 2024-06-17 PROCEDURE — 99203 OFFICE O/P NEW LOW 30 MIN: CPT | Performed by: DERMATOLOGY

## 2024-06-17 RX ORDER — NYSTATIN 100000 U/G
OINTMENT TOPICAL
Qty: 30 G | Refills: 5 | Status: SHIPPED | OUTPATIENT
Start: 2024-06-17

## 2024-06-17 NOTE — PROGRESS NOTES
Katelyn Gutierrez is a 68 year old female.    Chief Complaint   Patient presents with    Lesion     New Patient presents with a cyst-like lesion of concern on the Mid-Back (near bra strap) x several years. Cyst has grown and is itchy. 8 months ago, cyst did burst thick, odorous white substance. Pt had it evaluated in the past by 2 dermatologists without treatment.              Codeine, Monosodium glutamate, Monosodium glutamate, Sulfa antibiotics, Atorvastatin, Fentanyl, Hydrocodone, Hydrocodone-acetaminophen, Jardiance [empagliflozin], Augmentin [amoxicillin-pot clavulanate], Propoxyphene, Ezetimibe, and Propoxyphene n-apap  Current Outpatient Medications   Medication Sig Dispense Refill    nystatin 100,000 Units/g External Ointment Apply bid to rash in groin 30 g 5    methotrexate 2.5 MG Oral Tab Take 1 tablet (2.5 mg total) by mouth once a week.      Multiple Vitamin (MULTI-VITAMIN DAILY) Oral Tab Take by mouth.      acidophilus-pectin Oral Cap Take 1 capsule by mouth daily.      Evolocumab (REPATHA SURECLICK) 140 MG/ML Subcutaneous Solution Auto-injector Inject into the skin.      losartan 25 MG Oral Tab Take 1 tablet (25 mg total) by mouth daily.      aspirin 81 MG Oral Tab EC aspirin 81 mg tablet,delayed release, [RxNorm: 479251]      metFORMIN  MG Oral Tablet 24 Hr Take 1 tablet (750 mg total) by mouth daily with breakfast.      Coenzyme Q10 (CO Q-10 OR) Take by mouth.      Omega-3 Fatty Acids (FISH OIL BURP-LESS OR) Take by mouth.      TURMERIC OR Take by mouth.      prasugrel 10 MG Oral Tab Take 1 tablet (10 mg total) by mouth daily. 90 tablet 3    L-methylfolate Calcium 15 MG Oral Tab Take 1 tablet (15 mg total) by mouth daily.      loteprednol 0.5 % Ophthalmic Suspension Place 1 drop into both eyes 4 (four) times daily. 1% per pt        Past Medical History:    Abnormal pelvic exam    Bicuspid aortic valve (HCC)    Formatting of this note might be different from the original.   Formatting of  this note might be different from the original.   TTE  w/o change   F/u with cardiology for serial monitoring    Cervical lesion    Current mild episode of major depressive disorder without prior episode (HCC)    Formatting of this note might be different from the original.   Mood is improved.  Stable with therapy.   No si/hi.   PTSD, EMDR.   No pharmacologic rx  Formatting of this note might be different from the original.   Formatting of this note might be different from the original.   Mood is improved.  Stable with therapy.   No si/hi.   PTSD, EMDR.   No pharmacologic rx      Fuchs' corneal dystrophy    Lyme disease    OSTEOARTHRITIS    Post-operative nausea and vomiting    pt states she often wakes up during surgery    PTSD (post-traumatic stress disorder)        Rotator cuff injury      Social History:  Social History     Socioeconomic History    Marital status: Life Partner    Number of children: 1    Years of education: 17   Occupational History    Occupation: business      Comment: [employer]    Occupation: unemployed now   Tobacco Use    Smoking status: Former     Current packs/day: 0.00     Average packs/day: 1 pack/day for 2.0 years (2.0 ttl pk-yrs)     Types: Cigarettes     Start date: 1972     Quit date: 1974     Years since quittin.1    Smokeless tobacco: Never   Vaping Use    Vaping status: Never Used   Substance and Sexual Activity    Alcohol use: Yes     Comment: occasional    Drug use: No    Sexual activity: Yes     Partners: Female, Male     Comment: patient has one child    Other Topics Concern    Caffeine Concern Yes     Comment: 1 c. coffee in the morning    Exercise Yes     Comment: 1-3 miles/day    Seat Belt Yes    Reaction to local anesthetic No     Comment: Heart palpatations at Dentist    Pt has a pacemaker No    Pt has a defibrillator No   Social History Narrative    ** Merged History Encounter **                      Current  Outpatient Medications   Medication Sig Dispense Refill    nystatin 100,000 Units/g External Ointment Apply bid to rash in groin 30 g 5    methotrexate 2.5 MG Oral Tab Take 1 tablet (2.5 mg total) by mouth once a week.      Multiple Vitamin (MULTI-VITAMIN DAILY) Oral Tab Take by mouth.      acidophilus-pectin Oral Cap Take 1 capsule by mouth daily.      Evolocumab (REPATHA SURECLICK) 140 MG/ML Subcutaneous Solution Auto-injector Inject into the skin.      losartan 25 MG Oral Tab Take 1 tablet (25 mg total) by mouth daily.      aspirin 81 MG Oral Tab EC aspirin 81 mg tablet,delayed release, [RxNorm: 226061]      metFORMIN  MG Oral Tablet 24 Hr Take 1 tablet (750 mg total) by mouth daily with breakfast.      Coenzyme Q10 (CO Q-10 OR) Take by mouth.      Omega-3 Fatty Acids (FISH OIL BURP-LESS OR) Take by mouth.      TURMERIC OR Take by mouth.      prasugrel 10 MG Oral Tab Take 1 tablet (10 mg total) by mouth daily. 90 tablet 3    L-methylfolate Calcium 15 MG Oral Tab Take 1 tablet (15 mg total) by mouth daily.      loteprednol 0.5 % Ophthalmic Suspension Place 1 drop into both eyes 4 (four) times daily. 1% per pt       Allergies:   Allergies   Allergen Reactions    Codeine UNKNOWN     Vomittingall pain medications  Vomittingall pain medications    Monosodium Glutamate SWELLING    Monosodium Glutamate SWELLING     swelling    Sulfa Antibiotics HIVES    Atorvastatin MYALGIA    Fentanyl NAUSEA AND VOMITING    Hydrocodone NAUSEA AND VOMITING    Hydrocodone-Acetaminophen NAUSEA AND VOMITING     vomittingall pain medications  vomittingall pain medications  vomittingall pain medications  vomittingall pain medications    Jardiance [Empagliflozin] DIZZINESS    Augmentin [Amoxicillin-Pot Clavulanate]      Myalgia  Ringing in ear    Propoxyphene UNKNOWN     Vomittingall pain medications  Vomittingall pain medications    Ezetimibe NAUSEA ONLY    Propoxyphene N-Apap NAUSEA ONLY     Most pain medications cause nausea        Past Medical History:    Abnormal pelvic exam    Bicuspid aortic valve (HCC)    Formatting of this note might be different from the original.   Formatting of this note might be different from the original.   TTE 2022 w/o change   F/u with cardiology for serial monitoring    Cervical lesion    Current mild episode of major depressive disorder without prior episode (HCC)    Formatting of this note might be different from the original.   Mood is improved.  Stable with therapy.   No si/hi.   PTSD, EMDR.   No pharmacologic rx  Formatting of this note might be different from the original.   Formatting of this note might be different from the original.   Mood is improved.  Stable with therapy.   No si/hi.   PTSD, EMDR.   No pharmacologic rx      Fuchs' corneal dystrophy    Lyme disease    OSTEOARTHRITIS    Post-operative nausea and vomiting    pt states she often wakes up during surgery    PTSD (post-traumatic stress disorder)    9/11    Rotator cuff injury     Past Surgical History:   Procedure Laterality Date    Benign biopsy left Left 1993    Carpal tunnel release      Eye surgery      Foot surgery      Hand/finger surgery unlisted Right 10/19/20--Dr. Paulo Shay    ring finger A1 pulley release/trigger finger release    Jose localization wire 1 site left (cpt=19281)      1990's bn    Other  1994    Other accessory      dissected LAD, double stented 8/11/23    Other surgical history      A RCR RIGHT SHOULDER     Other surgical history      TRIGGER RELEASE RIGHT THUMB    Other surgical history      CTR BILATERAL      Other surgical history      NEUROMA REMOVED FOOT     Repair rotator cuff,acute       Social History     Socioeconomic History    Marital status: Life Partner     Spouse name: Not on file    Number of children: 1    Years of education: 17    Highest education level: Not on file   Occupational History    Occupation: business      Comment: [employer]    Occupation:  unemployed now   Tobacco Use    Smoking status: Former     Current packs/day: 0.00     Average packs/day: 1 pack/day for 2.0 years (2.0 ttl pk-yrs)     Types: Cigarettes     Start date: 1972     Quit date: 1974     Years since quittin.1    Smokeless tobacco: Never   Vaping Use    Vaping status: Never Used   Substance and Sexual Activity    Alcohol use: Yes     Comment: occasional    Drug use: No    Sexual activity: Yes     Partners: Female, Male     Comment: patient has one child    Other Topics Concern     Service Not Asked    Blood Transfusions Not Asked    Caffeine Concern Yes     Comment: 1 c. coffee in the morning    Occupational Exposure Not Asked    Hobby Hazards Not Asked    Sleep Concern Not Asked    Stress Concern Not Asked    Weight Concern Not Asked    Special Diet Not Asked    Back Care Not Asked    Exercise Yes     Comment: 1-3 miles/day    Bike Helmet Not Asked    Seat Belt Yes    Self-Exams Not Asked    Grew up on a farm Not Asked    History of tanning Not Asked    Outdoor occupation Not Asked    Breast feeding Not Asked    Reaction to local anesthetic No     Comment: Heart palpatations at Dentist    Pt has a pacemaker No    Pt has a defibrillator No   Social History Narrative    ** Merged History Encounter **          Social Determinants of Health     Financial Resource Strain: Not on file   Food Insecurity: Not on file   Transportation Needs: Not on file   Physical Activity: Not on file   Stress: Not on file   Social Connections: Not on file   Housing Stability: Not on file     Family History   Problem Relation Age of Onset    Heart Disorder Father     Depression Sister     Kidney Disease Sister     Heart Disorder Maternal Grandfather     Heart Disorder Paternal Grandfather     Breast Cancer Paternal Aunt 70        Dx Breast CA age 70    Dementia Mother     Alcohol and Other Disorders Associated Mother     Bleeding Disorders Neg     Colon Cancer Neg     Ovarian Cancer Neg                        HPI :      Chief Complaint   Patient presents with    Lesion     New Patient presents with a cyst-like lesion of concern on the Mid-Back (near bra strap) x several years. Cyst has grown and is itchy. 8 months ago, cyst did burst thick, odorous white substance. Pt had it evaluated in the past by 2 dermatologists without treatment.      Patient would like definitive management of cyst anxious regarding possible flare.    History of hair loss.  Has noted thinning hair posttreatment for left anterior descending dissection chronically managed.  On multiple medications.  History of intertrigo nystatin prescribed previously which had been helpful  Patient presents with concerns above.    Past notes/ records and appropriate/relevant lab results including pathology and past body maps reviewed. Updated and new information noted in current visit.       ROS:    Denies any other systemic complaints.  No fevers, chills, night sweats, sensitivity to the sun, deeper lumps or bumps.  No other skin complaints.  History, medications, allergies as noted.    Physical examination: Patient  well-developed well-nourished, alert oriented in no acute distress.  Exam of involved, appropriate areas of skin performed, including scalp, head, neck, face,nails, hair, external eyes, including conjunctival mucosa, eyelids, lips, external ears, back, chest, abdomen, arms, legs, palms.  Remarkable for lesions as noted   See map for details     Cystic nodule deep 2.5 cm central posterior back     multiple lentigines keratoses nevi no suspicious lesions     History of intertrigo flares during the summer      Decreased hair density diffuse over the scalp  ASSESSMENT AND PLAN:     Encounter Diagnoses   Name Primary?    Epidermal cyst Yes    SK (seborrheic keratosis)     Intertrigo     Benign neoplasm of skin, unspecified location        Assessment / plan:      Epidermal cyst refer to general surgery for excision given the location and  depth size previous inflamed nature may be more difficult to remove completely no antibiotics at this time  See referral    Lentigines keratoses nevi reassurance  Benign-appearing nevi, no atypical features on dermoscopy reassurance given monitor.    Waxy tan keratotic papules lesions in areas of concern as noted reassurance given.  Benign nature discussed.  Possibility of cryo, alphahydroxy acids over-the-counter retinol's discussed.    No other susupicious lesions on todays  exam.    Intertrigo nystatin  Pathophysiology discussed with patient.  Therapeutic options reviewed.  See  Medications in grid.  Instructions reviewed at length.      Hair loss likely multifactorial will follow-up for reevaluation of this.  Supportive care  Labs reviewed hemoglobin A1c 6.0 thyroid normal chemistries normal CBC normal  Recheck at follow-up      Pathophysiology discussed with patient.  Therapeutic options reviewed.  See  Medications in grid.  Instructions reviewed at length.     General skin care questions answered.   Reassurance regarding benign skin lesions.Signs and symptoms of skin cancer, ABCDE's of melanoma briefly reviewed.  Sunscreen use (broad-spectrum, ideally mineral, UVA UVB coverage SPF 30 or greater recommended), sun protection, encouraged.  Followup as noted in rtc or p.r.n.    Encounter Times new patient  Including precharting, reviewing chart, prior notes obtaining history: 10 minutes, medical exam :10 minutes, notes on body map, plan, counseling 10minutes My total time spent caring for the patient on the day of the encounter: 30 minutes     The patient indicates understanding of these issues and agrees to the plan.  The patient is asked to return as noted in follow-up /as noted above    This note was generated using Dragon voice recognition software.  Please contact me regarding any confusion resulting from errors in recognition.  Note to patient and family: The 21st Century Cures Act makes medical notes like  these available to patients. However, be advised this is a medical document. It is intended as [id number] communication and monitoring of a patient's care needs. It is written in medical language and may contain abbreviations or verbiage that are unfamiliar. It may appear blunt or direct. Medical documents are intended to carry relevant information, facts as evident and the clinical opinion of the practitioner.  No orders of the defined types were placed in this encounter.      Meds & Refills for this Visit:   Requested Prescriptions     Signed Prescriptions Disp Refills    nystatin 100,000 Units/g External Ointment 30 g 5     Sig: Apply bid to rash in groin       Encounter Diagnosis   Name Primary?    Epidermal cyst Yes       No orders of the defined types were placed in this encounter.      Results From Past 48 Hours:  No results found for this or any previous visit (from the past 48 hour(s)).    Meds This Visit:      Imaging Orders:  SURGERY - INTERNAL     Referral Orders:  Orders Placed This Encounter   Procedures    Surgery Referral - [place] ([facility]) - Adult & Peds

## 2024-06-18 ENCOUNTER — OFFICE VISIT (OUTPATIENT)
Dept: INTERNAL MEDICINE CLINIC | Facility: CLINIC | Age: 69
End: 2024-06-18

## 2024-06-18 DIAGNOSIS — Z51.81 ENCOUNTER FOR THERAPEUTIC DRUG MONITORING: ICD-10-CM

## 2024-06-18 DIAGNOSIS — F43.10 PTSD (POST-TRAUMATIC STRESS DISORDER): ICD-10-CM

## 2024-06-18 DIAGNOSIS — E66.9 CLASS II OBESITY: ICD-10-CM

## 2024-06-18 DIAGNOSIS — I35.8 AORTIC VALVE SCLEROSIS: ICD-10-CM

## 2024-06-18 DIAGNOSIS — E66.9 OBESITY (BMI 30.0-34.9): Primary | ICD-10-CM

## 2024-06-18 PROCEDURE — G0447 BEHAVIOR COUNSEL OBESITY 15M: HCPCS | Performed by: DIETITIAN, REGISTERED

## 2024-06-19 VITALS — WEIGHT: 188 LBS | BODY MASS INDEX: 33 KG/M2

## 2024-06-19 NOTE — PROGRESS NOTES
INITIAL OUTPATIENT NUTRITION CONSULTATIONNutrition Assessment    Medical Diagnosis: Obesity, Hypertension, and Prediabetes, elevated C reactive protein, polymyalgia    Physical Findings: joint pain, fatigue    Client Age and Gender: 68 year old female    Pertinent social hx:. Currently not working due to health hx.  Interested in finding employment      Labs:   No components found for: \"HGBA1C\"  Triglycerides   Date Value Ref Range Status   05/31/2024 130 30 - 149 mg/dL Final     Comment:     Reference interval for fasting triglycerides  Desirable: <150 mg/dL  Borderline: 150-199 mg/dL  High: 200-499 mg/dL  Very High: >=500 mg/dL         08/31/2020 115.00 <=150.00 mg/dL Final     LDL Cholesterol   Date Value Ref Range Status   05/31/2024 109 (H) <100 mg/dL Final     Comment:     Desirable <100 mg/dL   Borderline 100-129 mg/dL   High     >=130mg/dL         Calculated LDL   Date Value Ref Range Status   08/31/2020 209 (H) <=130 mg/dL Final     HDL Cholesterol   Date Value Ref Range Status   05/31/2024 81 (H) 40 - 59 mg/dL Final     Comment:     Interpretive Information:   An HDL cholesterol <40 mg/dL is low and constitutes a coronary heart disease risk factor. An HDL cholesterol >60 mg/dL is a negative risk factor for coronary heart disease.         Direct HDL   Date Value Ref Range Status   08/31/2020 60.1 >=40 mg/dL Final     Comment:     Guidelines for high density lipoprotein (HDL) are adapted from the National Cholesterol Education Program (NCEP).Values >=40 mg/dL are considered a negative risk factor for coronary heart disease (CHD) and are considered protective.     AST   Date Value Ref Range Status   05/08/2024 32 15 - 37 U/L Final   10/09/2020 22 0 - 32 U/L Final   03/17/2014 20 15 - 41 U/L Final     ALT   Date Value Ref Range Status   05/08/2024 29 13 - 56 U/L Final   10/09/2020 19 0 - 33 U/L Final   03/17/2014 29 14 - 54 U/L Final         Height:  Ht Readings from Last 1 Encounters:   06/09/24 5'  3\" (1.6 m)       Weight:   Wt Readings from Last 2 Encounters:   06/18/24 188 lb (85.3 kg)   06/09/24 184 lb (83.5 kg)       BMI Readings from Last 1 Encounters:   06/18/24 33.30 kg/m²       Weight change: Increase of 4 lbs in the past 2 months    Diet/Weight History: Max weight of 190 lbs.   Weight gain related to health issues and prednisone therapy.  Had seen [facility] and lost weight on Ozempic. Also wore a CGM to understand BG management.  Lost insurance coverage for medication.  Switched from [employer] system due to issues with cardiology.  Weight recurrence occurred.    Current Diet: Protein at all meals.  Inadequate fruits and vegetables.  Proteins can be high fat.  Pt focusing on reduced carb diet    Food/Beverage Intake: oral recall  Breakfast: 2 eggs and low carb waffle with butter and 1/4 tsp syrup  Lunch: Afternoon-[employer] or nuts  Dinner: Organic hot dog without buns/ 1/2 ham and cheese or ancient grains bread from [employer]/tacos and corn chips  Snacks: Almonds or pecans when BG is low  Beverages: No sugary drinks     Meal pattern: 2-3 meals/d, 1-2 snacks/d    Number of meals/week eaten at restaurants: not discussed        Alcohol Intake: 1-2 drinks/wk.  Beer or wine    Estimated current caloric intake: 1900 cals/d    Estimated caloric needs for weight loss: 1400 cals/d for 1 pounds/week weight loss    Macronutrient targets:   Carbohydrates: 122 gms or less (35%)  Protein: 87 gms (25%)  Fat: 62 gms or less heart healthy fats (40%)    Physical Activity: Hx of walking but no longer doing this due to heat and joint pain    Food Journal: no    Spent 60 minutes in consultation with the patient.    Assess: Pt with weight gain related to illness, stress, medication, and high fat diet.  Diet is high in saturated fat.    Advised: Pt that focus on weight loss should also prioritize heart health.  Reducing saturated fat was encouraged.  A Mediterranean style of eating high in plant foods and unsaturated fats was  recommended.  Tracking food intake to gain awareness of nutrition was recommended.    Assisted: Pt with determining nutrient and calorie needs. Suggested Lose It vivian for tracking. Provided healthy sample meal and snack ideas     Agreed: Pt agreed to track intake on an vivian and reduce high fat meats and cheese    Arranged:  Follow up appointment was scheduled    Westley Blanchard MS, RD, LDN

## 2024-06-27 ENCOUNTER — OFFICE VISIT (OUTPATIENT)
Dept: INTERNAL MEDICINE CLINIC | Facility: CLINIC | Age: 69
End: 2024-06-27
Payer: MEDICARE

## 2024-06-27 VITALS
SYSTOLIC BLOOD PRESSURE: 120 MMHG | HEART RATE: 67 BPM | RESPIRATION RATE: 16 BRPM | BODY MASS INDEX: 33.37 KG/M2 | WEIGHT: 186 LBS | HEIGHT: 62.5 IN | DIASTOLIC BLOOD PRESSURE: 70 MMHG

## 2024-06-27 DIAGNOSIS — E66.9 OBESITY (BMI 30.0-34.9): ICD-10-CM

## 2024-06-27 DIAGNOSIS — I70.0 THORACIC AORTA ATHEROSCLEROSIS (HCC): ICD-10-CM

## 2024-06-27 DIAGNOSIS — I35.8 AORTIC VALVE SCLEROSIS: ICD-10-CM

## 2024-06-27 DIAGNOSIS — Z91.89 AT RISK FOR CARDIOVASCULAR EVENT: ICD-10-CM

## 2024-06-27 DIAGNOSIS — F43.10 PTSD (POST-TRAUMATIC STRESS DISORDER): ICD-10-CM

## 2024-06-27 DIAGNOSIS — R06.02 SHORTNESS OF BREATH: ICD-10-CM

## 2024-06-27 DIAGNOSIS — Z51.81 ENCOUNTER FOR THERAPEUTIC DRUG MONITORING: Primary | ICD-10-CM

## 2024-06-27 DIAGNOSIS — I25.10 CORONARY ARTERY DISEASE INVOLVING NATIVE CORONARY ARTERY OF NATIVE HEART WITHOUT ANGINA PECTORIS: ICD-10-CM

## 2024-06-27 PROCEDURE — 99214 OFFICE O/P EST MOD 30 MIN: CPT | Performed by: INTERNAL MEDICINE

## 2024-06-27 RX ORDER — LEVOMEFOLATE CALCIUM 15 MG
15 TABLET ORAL DAILY
Qty: 90 TABLET | Refills: 3 | Status: SHIPPED | OUTPATIENT
Start: 2024-06-27

## 2024-06-27 NOTE — PROGRESS NOTES
HISTORY OF PRESENT ILLNESS  Chief Complaint   Patient presents with    Weight Check     Up 2        Katelyn Gutierrez is a 68 year old female here for follow up in medical weight loss program.     Denies chest pain, shortness of breath, dizziness, blurred vision, headache, paresthesia, nausea/vomiting.   Reviewed cardiology visit and recommended to start blood pressure check at home   Would like to resume L methl folate   Did find that it was extremely effective  Reviewed recommendations with [facility]  Recently needed to have PET scan   Having shortness of breath and presyncope on June 5th  Sents placed August 11       Wt Readings from Last 6 Encounters:   06/27/24 186 lb (84.4 kg)   06/18/24 188 lb (85.3 kg)   06/09/24 184 lb (83.5 kg)   05/29/24 189 lb (85.7 kg)   04/18/24 184 lb (83.5 kg)   03/04/24 182 lb (82.6 kg)            Breakfast Lunch Dinner Snacks Fluids              REVIEW OF SYSTEMS  GENERAL HEALTH: feels well otherwise, denied any fevers chills or night sweats   RESPIRATORY: denies shortness of breath   CARDIOVASCULAR: denies chest pain  GI: denies abdominal pain    EXAM  /70   Pulse 67   Resp 16   Ht 5' 2.5\" (1.588 m)   Wt 186 lb (84.4 kg)   LMP  (LMP Unknown)   BMI 33.48 kg/m²   GENERAL: well developed, well nourished,in no apparent distress, A/O x3  SKIN: no rashes,no suspicious lesions  HEENT: atraumatic, normocephalic, OP-clear, PERRL  NECK: supple,no adenopathy  LUNGS: clear to auscultation bilaterally   CARDIO: RRR without murmur  GI: good BS's,NT/ND, no masses or HSM  EXTREMITIES: no cyanosis, no clubbing, no edema    Lab Results   Component Value Date    WBC 5.2 06/09/2024    RBC 3.89 06/09/2024    HGB 12.3 06/09/2024    HCT 36.8 06/09/2024    MCV 94.6 06/09/2024    MCH 31.6 06/09/2024    MCHC 33.4 06/09/2024    RDW 13.2 06/09/2024    .0 06/09/2024     Lab Results   Component Value Date    GLU 87 06/09/2024    BUN 13 06/09/2024    BUNCREA 14.3 06/09/2024    CREATSERUM 0.91  06/09/2024    ANIONGAP 5 06/09/2024    GFRNAA 62 02/19/2018    GFRAA 71 02/19/2018    CA 9.6 06/09/2024    OSMOCALC 291 06/09/2024    ALKPHO 71 05/08/2024    AST 32 05/08/2024    ALT 29 05/08/2024    BILT 0.6 05/08/2024    TP 6.8 05/08/2024    ALB 3.5 05/08/2024     06/09/2024    K 3.9 06/09/2024     06/09/2024    CO2 28.0 06/09/2024     Lab Results   Component Value Date     05/31/2024    A1C 6.0 (H) 05/31/2024     Lab Results   Component Value Date    CHOLEST 212 (H) 05/31/2024    TRIG 130 05/31/2024    HDL 81 (H) 05/31/2024     (H) 05/31/2024    VLDL 22 05/31/2024    NONHDLC 131 (H) 05/31/2024     Lab Results   Component Value Date    T4F 0.8 05/08/2024    TSH 1.580 05/08/2024     No results found for: \"B12\", \"VITB12\"  Lab Results   Component Value Date    VITD 43.39 01/21/2022       Current Outpatient Medications on File Prior to Visit   Medication Sig Dispense Refill    methotrexate 2.5 MG Oral Tab Take 1 tablet (2.5 mg total) by mouth once a week.      Multiple Vitamin (MULTI-VITAMIN DAILY) Oral Tab Take by mouth.      Evolocumab (REPATHA SURECLICK) 140 MG/ML Subcutaneous Solution Auto-injector Inject into the skin.      losartan 25 MG Oral Tab Take 1 tablet (25 mg total) by mouth daily.      aspirin 81 MG Oral Tab EC aspirin 81 mg tablet,delayed release, [RxNorm: 507728]      metFORMIN  MG Oral Tablet 24 Hr Take 1 tablet (750 mg total) by mouth daily with breakfast.      Coenzyme Q10 (CO Q-10 OR) Take by mouth.      Omega-3 Fatty Acids (FISH OIL BURP-LESS OR) Take by mouth.      TURMERIC OR Take by mouth.      prasugrel 10 MG Oral Tab Take 1 tablet (10 mg total) by mouth daily. 90 tablet 3    loteprednol 0.5 % Ophthalmic Suspension Place 1 drop into both eyes 4 (four) times daily. 1% per pt       No current facility-administered medications on file prior to visit.       ASSESSMENT  Analyzed weight data:       Diagnoses and all orders for this visit:    Encounter for therapeutic  drug monitoring  -     semaglutide-weight management 0.25 MG/0.5ML Subcutaneous Solution Auto-injector; Inject 0.5 mL (0.25 mg total) into the skin once a week for 4 doses.    Coronary artery disease involving native coronary artery of native heart without angina pectoris  -     semaglutide-weight management 0.25 MG/0.5ML Subcutaneous Solution Auto-injector; Inject 0.5 mL (0.25 mg total) into the skin once a week for 4 doses.    PTSD (post-traumatic stress disorder)  -     semaglutide-weight management 0.25 MG/0.5ML Subcutaneous Solution Auto-injector; Inject 0.5 mL (0.25 mg total) into the skin once a week for 4 doses.    Obesity (BMI 30.0-34.9)  -     semaglutide-weight management 0.25 MG/0.5ML Subcutaneous Solution Auto-injector; Inject 0.5 mL (0.25 mg total) into the skin once a week for 4 doses.    Aortic valve sclerosis  -     semaglutide-weight management 0.25 MG/0.5ML Subcutaneous Solution Auto-injector; Inject 0.5 mL (0.25 mg total) into the skin once a week for 4 doses.    At risk for cardiovascular event  -     semaglutide-weight management 0.25 MG/0.5ML Subcutaneous Solution Auto-injector; Inject 0.5 mL (0.25 mg total) into the skin once a week for 4 doses.    Shortness of breath  -     semaglutide-weight management 0.25 MG/0.5ML Subcutaneous Solution Auto-injector; Inject 0.5 mL (0.25 mg total) into the skin once a week for 4 doses.    Thoracic aorta atherosclerosis (HCC)  -     semaglutide-weight management 0.25 MG/0.5ML Subcutaneous Solution Auto-injector; Inject 0.5 mL (0.25 mg total) into the skin once a week for 4 doses.    Other orders  -     L-methylfolate 15 MG Oral Tab; Take 1 tablet (15 mg total) by mouth daily.  -     Discontinue: semaglutide-weight management 0.25 MG/0.5ML Subcutaneous Solution Auto-injector; Inject 0.5 mL (0.25 mg total) into the skin once a week for 4 doses.        PLAN   Consult 4/18/24 : 184 lb   Refill l methyl folate  Total time spent on chart review, pre-charting,  obtaining history, counseling, and educating, reviewing labs was 30 minutes.  -denies any personal or family history of pancreatitis, pancreatic cancer, thyroid cancer, MEN2 - discussed MOA, advised side effects and adverse effects of medication.    Sample given for wegovy , will assess for insurance coverage   Reviewed role in CV risk reduction   -advised of side effects and adverse effects of this medication  Nutrition: low carb diet/ recommended to eat breakfast daily/ regular protein intake  Medication use and side effects reviewed with patient.  Medication contraindications: n/a  Follow up with dietitian and psychologist as recommended.  Discussed the role of sleep and stress in weight management.  Counseled on comprehensive weight loss plan including attention to nutrition, exercise and behavior/stress management for success. See patient instruction below for more details.  Discussed strategies to overcome barriers to successful weight loss and weight maintenance  FITTE: ACSM recommendations (150-300 minutes/ week in active weight loss)   Weight Loss consent to treat reviewed and signed    There are no Patient Instructions on file for this visit.    No follow-ups on file.    Patient verbalizes understanding.    Alexandria Hassan MD  '

## 2024-07-01 ENCOUNTER — TELEPHONE (OUTPATIENT)
Dept: INTERNAL MEDICINE CLINIC | Facility: CLINIC | Age: 69
End: 2024-07-01

## 2024-07-19 ENCOUNTER — MED REC SCAN ONLY (OUTPATIENT)
Dept: FAMILY MEDICINE CLINIC | Facility: CLINIC | Age: 69
End: 2024-07-19

## 2024-07-19 ENCOUNTER — OFFICE VISIT (OUTPATIENT)
Dept: INTEGRATIVE MEDICINE | Facility: CLINIC | Age: 69
End: 2024-07-19
Payer: MEDICARE

## 2024-07-19 VITALS
DIASTOLIC BLOOD PRESSURE: 66 MMHG | SYSTOLIC BLOOD PRESSURE: 112 MMHG | BODY MASS INDEX: 33.48 KG/M2 | WEIGHT: 186.63 LBS | HEART RATE: 86 BPM | OXYGEN SATURATION: 97 % | HEIGHT: 62.5 IN

## 2024-07-19 DIAGNOSIS — F43.10 PTSD (POST-TRAUMATIC STRESS DISORDER): ICD-10-CM

## 2024-07-19 DIAGNOSIS — M25.50 POLYARTHRALGIA: ICD-10-CM

## 2024-07-19 DIAGNOSIS — I65.23 BILATERAL CAROTID ARTERY STENOSIS: ICD-10-CM

## 2024-07-19 DIAGNOSIS — R00.0 TACHYCARDIA: ICD-10-CM

## 2024-07-19 DIAGNOSIS — R63.5 WEIGHT GAIN: ICD-10-CM

## 2024-07-19 DIAGNOSIS — R29.90 NEUROLOGICAL DYSFUNCTION: ICD-10-CM

## 2024-07-19 DIAGNOSIS — R65.10 SYSTEMIC INFLAMMATORY RESPONSE SYNDROME (HCC): Primary | ICD-10-CM

## 2024-07-19 DIAGNOSIS — R53.82 CHRONIC FATIGUE: ICD-10-CM

## 2024-07-19 DIAGNOSIS — H04.123 INSUFFICIENCY OF TEAR FILM OF BOTH EYES: ICD-10-CM

## 2024-07-19 DIAGNOSIS — Z86.19 HISTORY OF LYME DISEASE: ICD-10-CM

## 2024-07-19 DIAGNOSIS — R79.82 ELEVATED C-REACTIVE PROTEIN (CRP): ICD-10-CM

## 2024-07-19 DIAGNOSIS — M35.3 PMR (POLYMYALGIA RHEUMATICA) (HCC): ICD-10-CM

## 2024-07-19 DIAGNOSIS — E66.9 CLASS 1 OBESITY WITH SERIOUS COMORBIDITY AND BODY MASS INDEX (BMI) OF 33.0 TO 33.9 IN ADULT, UNSPECIFIED OBESITY TYPE: ICD-10-CM
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
## 2024-07-19 NOTE — PATIENT INSTRUCTIONS
Follow up in 8 weeks after blood work and cardiology appointments     The following website can be utilized to purchase supplements.     [email or web address]       Blood work is printed   Please get it drawn at [facility]

## 2024-07-27 ENCOUNTER — HOSPITAL ENCOUNTER (OUTPATIENT)
Age: 69
Discharge: HOME | End: 2024-07-27
Payer: MEDICARE

## 2024-07-27 VITALS
DIASTOLIC BLOOD PRESSURE: 86 MMHG | HEART RATE: 72 BPM | SYSTOLIC BLOOD PRESSURE: 136 MMHG | OXYGEN SATURATION: 98 % | RESPIRATION RATE: 21 BRPM | TEMPERATURE: 97 F

## 2024-07-27 DIAGNOSIS — J06.9 VIRAL URI: Primary | ICD-10-CM

## 2024-07-27 LAB
S PYO AG THROAT QL: NEGATIVE
SARS-COV-2 RNA RESP QL NAA+PROBE: NOT DETECTED

## 2024-07-27 PROCEDURE — 99213 OFFICE O/P EST LOW 20 MIN: CPT | Performed by: PHYSICIAN ASSISTANT

## 2024-07-27 PROCEDURE — U0002 COVID-19 LAB TEST NON-CDC: HCPCS | Performed by: PHYSICIAN ASSISTANT

## 2024-07-27 PROCEDURE — 87880 STREP A ASSAY W/OPTIC: CPT | Performed by: PHYSICIAN ASSISTANT

## 2024-07-27 NOTE — ED INITIAL ASSESSMENT (HPI)
Pt is here because this am she began with a sore throat and a possible blister/exudate on her tonsils.  She sometimes has a productive cough, some muscle aches

## 2024-07-27 NOTE — ED PROVIDER NOTES
Patient Seen in: [facility]      History     Chief Complaint   Patient presents with    Sore Throat     Stated Complaint: sore throat    Subjective:   HPI    68 YO female presents to immediate care for evaluation of sore throat and infrequent nonproductive cough starting this morning.  Patient request COVID testing.  She denies fever/chills, SOB or chest pain.        Objective:   Past Medical History:    Abnormal pelvic exam    Bicuspid aortic valve (HCC)    Formatting of this note might be different from the original.   Formatting of this note might be different from the original.   TTE 2022 w/o change   F/u with cardiology for serial monitoring    Cervical lesion    Current mild episode of major depressive disorder without prior episode (HCC)    Formatting of this note might be different from the original.   Mood is improved.  Stable with therapy.   No si/hi.   PTSD, EMDR.   No pharmacologic rx  Formatting of this note might be different from the original.   Formatting of this note might be different from the original.   Mood is improved.  Stable with therapy.   No si/hi.   PTSD, EMDR.   No pharmacologic rx      Fuchs' corneal dystrophy    Lyme disease    OSTEOARTHRITIS    Post-operative nausea and vomiting    pt states she often wakes up during surgery    PTSD (post-traumatic stress disorder)    9/11    Rotator cuff injury              Past Surgical History:   Procedure Laterality Date    Benign biopsy left Left 1993    Carpal tunnel release      Eye surgery      Foot surgery      Hand/finger surgery unlisted Right 10/19/20--Dr. Paulo Shay    ring finger A1 pulley release/trigger finger release    Jose localization wire 1 site left (cpt=19281)      1990's bn    Other  1994    Other accessory      dissected LAD, double stented 8/11/23    Other surgical history      A RCR RIGHT SHOULDER     Other surgical history      TRIGGER RELEASE RIGHT THUMB    Other surgical history      CTR BILATERAL       Other surgical history      NEUROMA REMOVED FOOT     Repair rotator cuff,acute                  Social History     Socioeconomic History    Marital status: Life Partner    Number of children: 1    Years of education: 17   Occupational History    Occupation: business      Comment: [employer]    Occupation: unemployed now   Tobacco Use    Smoking status: Former     Current packs/day: 0.00     Average packs/day: 1 pack/day for 2.0 years (2.0 ttl pk-yrs)     Types: Cigarettes     Start date: 1972     Quit date: 1974     Years since quittin.2    Smokeless tobacco: Never   Vaping Use    Vaping status: Never Used   Substance and Sexual Activity    Alcohol use: Yes     Comment: occasional    Drug use: No    Sexual activity: Yes     Partners: Female, Male     Comment: patient has one child    Other Topics Concern    Caffeine Concern Yes     Comment: 1 c. coffee in the morning    Exercise Yes     Comment: 1-3 miles/day    Seat Belt Yes    Reaction to local anesthetic No     Comment: Heart palpatations at [facility]    Pt has a pacemaker No    Pt has a defibrillator No   Social History Narrative    ** Merged History Encounter **                   Review of Systems    Positive for stated Chief Complaint: Sore Throat    Other systems are as noted in HPI.  Constitutional and vital signs reviewed.      All other systems reviewed and negative except as noted above.    Physical Exam     ED Triage Vitals [24 0958]   /86   Pulse 72   Resp 21   Temp 97.1 °F (36.2 °C)   Temp src Temporal   SpO2 98 %   O2 Device None (Room air)       Current Vitals:   Vital Signs  BP: 136/86  Pulse: 72  Resp: 21  Temp: 97.1 °F (36.2 °C)  Temp src: Temporal    Oxygen Therapy  SpO2: 98 %  O2 Device: None (Room air)            Physical Exam  Vitals and nursing note reviewed.   Constitutional:       General: She is not in acute distress.     Appearance: Normal appearance. She is not ill-appearing,  toxic-appearing or diaphoretic.   HENT:      Mouth/Throat:      Mouth: Mucous membranes are moist.      Pharynx: Posterior oropharyngeal erythema present. No pharyngeal swelling or oropharyngeal exudate.   Cardiovascular:      Rate and Rhythm: Normal rate.   Pulmonary:      Effort: Pulmonary effort is normal. No respiratory distress.      Breath sounds: Normal breath sounds. No wheezing.   Neurological:      Mental Status: She is alert and oriented to person, place, and time.   Psychiatric:         Mood and Affect: Mood normal.         Behavior: Behavior normal.         ED Course     Labs Reviewed   POCT RAPID STREP - Normal   RAPID SARS-COV-2 BY PCR - Normal       MDM      Differential diagnosis considered but not limited to COVID, other viral illness, strep pharyngitis    Erythema at posterior pharynx.  Physical exam is otherwise unremarkable.  Rapid strep and COVID-negative.  Supportive care discussed for likely viral illness. Patient declined prescription for Tessalon Perles.  She feels comfortable taking OTC cough/cold medications at this time.        Medical Decision Making  Amount and/or Complexity of Data Reviewed  Labs: ordered. Decision-making details documented in ED Course.    Risk  OTC drugs.        Disposition and Plan     Clinical Impression:  1. Viral URI         Disposition:  Discharge  7/27/2024 10:14 am    Follow-up:  [facility] [address] [address] [phone]    If symptoms worsen          Medications Prescribed:  Current Discharge Medication List

## 2024-07-29 ENCOUNTER — EKG ENCOUNTER (OUTPATIENT)
Dept: LAB | Age: 69
End: 2024-07-29
Attending: INTERNAL MEDICINE
Payer: MEDICARE

## 2024-07-29 ENCOUNTER — LAB ENCOUNTER (OUTPATIENT)
Dept: LAB | Age: 69
End: 2024-07-29
Attending: INTERNAL MEDICINE
Payer: MEDICARE

## 2024-07-29 ENCOUNTER — TELEPHONE (OUTPATIENT)
Dept: INTEGRATIVE MEDICINE | Facility: CLINIC | Age: 69
End: 2024-07-29

## 2024-07-29 ENCOUNTER — HOSPITAL ENCOUNTER (OUTPATIENT)
Dept: GENERAL RADIOLOGY | Age: 69
Discharge: HOME | End: 2024-07-29
Attending: INTERNAL MEDICINE
Payer: MEDICARE

## 2024-07-29 VITALS — WEIGHT: 185 LBS | BODY MASS INDEX: 32.78 KG/M2 | HEIGHT: 63 IN

## 2024-07-29 DIAGNOSIS — Z01.818 PRE-OP TESTING: ICD-10-CM

## 2024-07-29 LAB
ANION GAP SERPL CALC-SCNC: 6 MMOL/L (ref 0–18)
ATRIAL RATE: 71 BPM
BUN BLD-MCNC: 15 MG/DL (ref 9–23)
CALCIUM BLD-MCNC: 9.8 MG/DL (ref 8.7–10.4)
CHLORIDE SERPL-SCNC: 106 MMOL/L (ref 98–112)
CO2 SERPL-SCNC: 26 MMOL/L (ref 21–32)
CREAT BLD-MCNC: 0.94 MG/DL
EGFRCR SERPLBLD CKD-EPI 2021: 65 ML/MIN/1.73M2 (ref 60–?)
ERYTHROCYTE [DISTWIDTH] IN BLOOD BY AUTOMATED COUNT: 13 %
FASTING STATUS PATIENT QL REPORTED: NO
GLUCOSE BLD-MCNC: 115 MG/DL (ref 70–99)
HCT VFR BLD AUTO: 36.3 %
HGB BLD-MCNC: 12.3 G/DL
MCH RBC QN AUTO: 31.5 PG (ref 26–34)
MCHC RBC AUTO-ENTMCNC: 33.9 G/DL (ref 31–37)
MCV RBC AUTO: 92.8 FL
OSMOLALITY SERPL CALC.SUM OF ELEC: 288 MOSM/KG (ref 275–295)
P AXIS: 70 DEGREES
P-R INTERVAL: 138 MS
PLATELET # BLD AUTO: 252 10(3)UL (ref 150–450)
POTASSIUM SERPL-SCNC: 4.1 MMOL/L (ref 3.5–5.1)
Q-T INTERVAL: 416 MS
QRS DURATION: 76 MS
QTC CALCULATION (BEZET): 452 MS
R AXIS: 73 DEGREES
RBC # BLD AUTO: 3.91 X10(6)UL
SODIUM SERPL-SCNC: 138 MMOL/L (ref 136–145)
T AXIS: 78 DEGREES
VENTRICULAR RATE: 71 BPM
WBC # BLD AUTO: 6.7 X10(3) UL (ref 4–11)

## 2024-07-29 PROCEDURE — 93010 ELECTROCARDIOGRAM REPORT: CPT | Performed by: INTERNAL MEDICINE

## 2024-07-29 PROCEDURE — 71046 X-RAY EXAM CHEST 2 VIEWS: CPT | Performed by: INTERNAL MEDICINE

## 2024-07-29 PROCEDURE — 85027 COMPLETE CBC AUTOMATED: CPT

## 2024-07-29 PROCEDURE — 93005 ELECTROCARDIOGRAM TRACING: CPT

## 2024-07-29 PROCEDURE — 80048 BASIC METABOLIC PNL TOTAL CA: CPT

## 2024-07-29 RX ORDER — CHLORHEXIDINE GLUCONATE 40 MG/ML
SOLUTION TOPICAL
Status: CANCELLED | OUTPATIENT
Start: 2024-07-30 | End: 2024-07-30

## 2024-07-29 RX ORDER — SODIUM CHLORIDE 9 MG/ML
3 INJECTION, SOLUTION INTRAVENOUS
Status: CANCELLED | OUTPATIENT
Start: 2024-07-30 | End: 2024-07-30

## 2024-07-29 RX ORDER — ASPIRIN 81 MG/1
324 TABLET, CHEWABLE ORAL ONCE
Status: CANCELLED | OUTPATIENT
Start: 2024-07-29 | End: 2024-07-29

## 2024-07-30 ENCOUNTER — OFFICE VISIT (OUTPATIENT)
Dept: INTERNAL MEDICINE CLINIC | Facility: CLINIC | Age: 69
End: 2024-07-30
Payer: MEDICARE

## 2024-07-30 VITALS — WEIGHT: 185 LBS | BODY MASS INDEX: 33 KG/M2

## 2024-07-30 DIAGNOSIS — E66.9 OBESITY (BMI 30.0-34.9): Primary | ICD-10-CM

## 2024-07-30 PROCEDURE — G0447 BEHAVIOR COUNSEL OBESITY 15M: HCPCS | Performed by: DIETITIAN, REGISTERED

## 2024-07-30 NOTE — PROGRESS NOTES
FOLLOW UP NUTRITION CONSULTATION  Nutrition Assessment    Diagnosis:Obesity, polymalgia, dissected LAD, prediabetes, PTSD, HTN, elevated D-reactive protein    Number of consultations with dietitian: 2    Height:  Ht Readings from Last 1 Encounters:   07/19/24 5' 2.5\" (1.588 m)       Weight:   Wt Readings from Last 2 Encounters:   07/30/24 185 lb (83.9 kg)   07/19/24 186 lb 9.6 oz (84.6 kg)       BMI:  BMI Readings from Last 1 Encounters:   07/30/24 32.77 kg/m²       Weight change: Decrease of 3 lbs in the past month        Current Diet/Changes in Eating Habits: Reduced saturated fat and sugar      Diet/Lifestyle Modifications Following Previous Nutrition Consultation      Food journal: no    Physical activity: Limited to myalgias    Spent 45 minutes in consultation with the patient.    Assess:  Pt with net weight loss due to reduced fat diet. Fish intake has increased while beef and cheese are reduced.  Despite celebrating her birthday this month with celebratory foods pt was able to lose weight.    Advised: Pt  was educated regarding benefits of reduced saturated fat including it not only improves heart health, but reduces risk of certain cancers and reduces caloric intake.   Pt has limited physical activity related to myalgias and budget does not allow for fitness center or .  Encouraged pt to consider home exercise. Pt encouraged to incorporate more fruits and vegetables into diet.      Assisted: Pt has sources of added sugar in her diet such as granola, jello, ice cream.  Suggested lower sugar, lower calorie alternatives.  Provided alternative gentle exercise recommendations including YouTube physical therapy instructor with exercise for fibromyalgia.  Provided  healthy recipe resources that focus on increased plant foods.    Agreed: Pt agreed to try recipe websites and YouTube exercise resources.  Continuing a low saturated fat diet was agreed upon.  Swapping high sugar foods in diet for healthier  alternatives.    Arranged: Follow up Sept 10    Westley Blanchard MS, RD, LDN

## 2024-08-02 ENCOUNTER — HOSPITAL ENCOUNTER (OUTPATIENT)
Dept: INTERVENTIONAL RADIOLOGY/VASCULAR | Facility: HOSPITAL | Age: 69
Discharge: HOME | End: 2024-08-02
Attending: INTERNAL MEDICINE | Admitting: INTERNAL MEDICINE
Payer: MEDICARE

## 2024-08-02 VITALS
OXYGEN SATURATION: 95 % | RESPIRATION RATE: 16 BRPM | HEIGHT: 63 IN | HEART RATE: 61 BPM | SYSTOLIC BLOOD PRESSURE: 142 MMHG | DIASTOLIC BLOOD PRESSURE: 71 MMHG | TEMPERATURE: 97 F | BODY MASS INDEX: 32.78 KG/M2 | WEIGHT: 185 LBS

## 2024-08-02 DIAGNOSIS — R94.39 ABNORMAL STRESS TEST: ICD-10-CM

## 2024-08-02 DIAGNOSIS — Z95.5 HX OF HEART ARTERY STENT: ICD-10-CM

## 2024-08-02 LAB — GLUCOSE BLDC GLUCOMTR-MCNC: 101 MG/DL (ref 70–99)

## 2024-08-02 PROCEDURE — 36415 COLL VENOUS BLD VENIPUNCTURE: CPT

## 2024-08-02 PROCEDURE — 93458 L HRT ARTERY/VENTRICLE ANGIO: CPT | Performed by: INTERNAL MEDICINE

## 2024-08-02 PROCEDURE — 4A023N8 MEASUREMENT OF CARDIAC SAMPLING AND PRESSURE, BILATERAL, PERCUTANEOUS APPROACH: ICD-10-PCS | Performed by: INTERNAL MEDICINE

## 2024-08-02 PROCEDURE — 99152 MOD SED SAME PHYS/QHP 5/>YRS: CPT | Performed by: INTERNAL MEDICINE

## 2024-08-02 PROCEDURE — 82962 GLUCOSE BLOOD TEST: CPT

## 2024-08-02 PROCEDURE — B2111ZZ FLUOROSCOPY OF MULTIPLE CORONARY ARTERIES USING LOW OSMOLAR CONTRAST: ICD-10-PCS | Performed by: INTERNAL MEDICINE

## 2024-08-02 RX ORDER — SODIUM CHLORIDE 9 MG/ML
3 INJECTION, SOLUTION INTRAVENOUS
Status: COMPLETED | OUTPATIENT
Start: 2024-08-02 | End: 2024-08-02

## 2024-08-02 RX ORDER — ASPIRIN 81 MG/1
324 TABLET, CHEWABLE ORAL ONCE
Status: DISCONTINUED | OUTPATIENT
Start: 2024-08-02 | End: 2024-08-02

## 2024-08-02 RX ORDER — MIDAZOLAM HYDROCHLORIDE 1 MG/ML
INJECTION INTRAMUSCULAR; INTRAVENOUS
Status: COMPLETED
Start: 2024-08-02 | End: 2024-08-02

## 2024-08-02 RX ORDER — LIDOCAINE HYDROCHLORIDE 20 MG/ML
INJECTION, SOLUTION EPIDURAL; INFILTRATION; INTRACAUDAL; PERINEURAL
Status: COMPLETED
Start: 2024-08-02 | End: 2024-08-02

## 2024-08-02 RX ORDER — DIPHENHYDRAMINE HYDROCHLORIDE 50 MG/ML
INJECTION INTRAMUSCULAR; INTRAVENOUS
Status: COMPLETED
Start: 2024-08-02 | End: 2024-08-02

## 2024-08-02 RX ORDER — CHLORHEXIDINE GLUCONATE 40 MG/ML
SOLUTION TOPICAL
Status: COMPLETED | OUTPATIENT
Start: 2024-08-02 | End: 2024-08-02

## 2024-08-02 RX ORDER — VERAPAMIL HYDROCHLORIDE 2.5 MG/ML
INJECTION, SOLUTION INTRAVENOUS
Status: COMPLETED
Start: 2024-08-02 | End: 2024-08-02

## 2024-08-02 RX ORDER — HEPARIN SODIUM 1000 [USP'U]/ML
INJECTION, SOLUTION INTRAVENOUS; SUBCUTANEOUS
Status: COMPLETED
Start: 2024-08-02 | End: 2024-08-02

## 2024-08-02 RX ORDER — NITROGLYCERIN 20 MG/100ML
INJECTION INTRAVENOUS
Status: COMPLETED
Start: 2024-08-02 | End: 2024-08-02

## 2024-08-02 RX ADMIN — CHLORHEXIDINE GLUCONATE: 40 SOLUTION TOPICAL at 11:00:00

## 2024-08-02 RX ADMIN — SODIUM CHLORIDE 3 ML/KG/HR: 9 INJECTION, SOLUTION INTRAVENOUS at 11:00:00

## 2024-08-02 NOTE — PROCEDURES
[facility]  part of [facility]    Cardiac Cath Procedure Note  Katelyn Gutierrez Patient Status:  Outpatient    1955 MRN [id number]   Location [facility] INTERVENTIONAL SUITES Attending Mauro Byrne MD   Hosp Day # 0 PCP Kenia Lamar MD       Cardiologist: Mauro Byrne MD  Primary Proceduralist: Mauro Byrne MD  Procedure Performed: LHC and LV  Date of Procedure: 2024   Indication: Positive stress test    Summary of procedure:  Normal coronary anatomy      Assessment:  False positive stress test  Noncardiac chest pain or shortness of breath with normal LVEDP and coronary anatomy      Recommendations:  No changes cardiac meds  Discharge home 2 hours      Left Ventriculography and hemodynamics:   LV EF not done  LV EDP 12 mmHg  No gradient across aortic valve        Coronary Angiography  RCA:  Dominant and free of obstructive disease, supplies PDA and PL    Left main:  Free of obstructive disease    Left anterior descending:.  Stented segment unremarkable.  Free of obstructive disease, supplies multiple diagonals which are non-obstructive    Circumflex:  Free of obstructive disease, supplies multiple OM branches which are patent        Summary of Case: After written informed consent was obtained from the patient, patient was brought to the cardiac catheterization laboratory.  Patient was prepped and draped in the usual sterile fashion. Lidocaine 1% was used to infiltrate the right radial artery for local anesthesia and a 6 Czech introducer sheath was inserted into the right radial artery.      Selective coronary angiography performed with JR4 catheter for RCA and JL3.5 catheter for LCA.  Angiography performed in standard projections.      6 [place] JR4 catheter placed in LV for hemodynamics.    Specimen sent to: No specimen collected  Estimated blood loss: 10 cc  Closure:  TR band      IV was maintained by RN and moderate conscious sedation of versed and fentanyl was  given.  Patient was assessed and monitoring of oxygen, heart rate and blood pressure by nurse and myself during the exam 35 minutes.      Mauro Byrne MD  08/02/24

## 2024-08-02 NOTE — INTERVAL H&P NOTE
Pre-op Diagnosis: * No pre-op diagnosis entered *    The above referenced H&P was reviewed by Mauro Byrne MD on 8/2/2024, the patient was examined and no significant changes have occurred in the patient's condition since the H&P was performed.  I discussed with the patient and/or legal representative the potential benefits, risks and side effects of this procedure; the likelihood of the patient achieving goals; and potential problems that might occur during recuperation.  I discussed reasonable alternatives to the procedure, including risks, benefits and side effects related to the alternatives and risks related to not receiving this procedure.  We will proceed with procedure as planned.

## 2024-08-02 NOTE — IVS NOTE
DISCHARGE NOTE      Pt is able to sit up and ambulate without difficulty.   Pt voided and tolerated fluids and food.   Procedural site remains dry and intact with good circulation, motion, and sensation.   No signs and symptoms of bleeding/hematoma noted.   IV access removed  Instruction provided, patient/family verbalizes understanding.   Dr. Byrne   spoke with patient/family post procedure.      Pt discharge via wheelchair to [facility]      Follow up Appointment: 8/12 1 amin     New Prescription: n/a

## 2024-08-02 NOTE — DISCHARGE INSTRUCTIONS
TRANSRADIAL DISCHARGE INSTRUCTION  HOME CARE INSTRUCTIONS FOLLOWING CORONARY ANGIOGRAPHY    Activity  DO NOT drive after the procedure.  You may resume driving late the following day according to the nurse or physician's instructions  Plan on resting and relaxing tonight and tomorrow  Resume your normal activity after 48 hours, or as instructed by your physician  Avoid drinking alcohol for the next 24 hours  Avoid wrist flexion, extension, and fine motor activities (i.e. texting, typing, using computer mouse, etc.) for 24 hours. Your arm board and sling will help remind you.  Do not lift or pull anything heavier than 5 to 8  pounds with affected hand for 1 week. Nothing heavier than a gallon of milk.    What is Normal?  A small lump at the procedure site associated with mild tenderness when touched  The procedure site may be bruised or discolored  There may be a small amount of drainage on the bandage    Special Instructions   Drink plenty of fluids during the next 24 hours to \"flush\" the contrast from your system  Keep the bandage clean and dry  After 24 hours, you must remove the bandage. TOMORROW AT ________. Do not put ointment, powders, or creams to site.  You can shower after removing the bandage, and wash the procedure site gently with soap and water  DO NOT submerge the procedure site for 1 week (no bath tubs, pools, or washing dishes)  If you choose to wear a bandage for a few days, make sure it remains clean and dry and that it is changed daily  For local swelling: apply ice  Bleeding can occur at the procedure site - both on the outside of the skin and/or beneath the surface of the skin        If bleeding occurs: Elevate hand above heart and apply local pressure  Swelling or a large lump at the procedure site can occur, which may be accompanied by moderate to severe pain  If the bleeding does not stop, call 911 and continue to apply pressure    When to contact physician:   Swelling, pain, or bleeding at  the site that is not relieved by applying ice or pressure  Signs of infection: Redness, warmth, drainage at the site, chills, or temperature of 100.5 or greater  Changes in sensation, numbness, or tingling of affected hand    Other    You may resume your present diet, unless otherwise specified by your physician.  A list of your medications was provided to you at discharge.  If you are on any METFORMIN containing agents - HOLD for 48 hours     **If you have any question or concern, please call the on-call nurse at [phone]

## 2024-08-03 ENCOUNTER — PATIENT MESSAGE (OUTPATIENT)
Dept: FAMILY MEDICINE CLINIC | Facility: CLINIC | Age: 69
End: 2024-08-03

## 2024-08-05 ENCOUNTER — TELEPHONE (OUTPATIENT)
Dept: FAMILY MEDICINE CLINIC | Facility: CLINIC | Age: 69
End: 2024-08-05

## 2024-08-05 NOTE — TELEPHONE ENCOUNTER
From: Katelyn Gutierrez  To: Kenia Lamar  Sent: 8/3/2024 12:10 PM CDT  Subject: Shortness of breath limited activity     I had an angiogram on 8/2/24 and my lad is good. My [facility] says to see my primary care doctor to help figure out what is cause. I have a scheduled appointment on 8/22 but would like to see you earlier. Please let me know if we may meet soon.

## 2024-08-05 NOTE — TELEPHONE ENCOUNTER
Appointment For: Katelyn Gutierrez ([id number])   Visit Type: MYCHART EXAM (2964)      8/22/2024    3:00 PM  30 mins.  Kenia Lamar       [facility] [address]      Patient Comments:   Shortness of breath, near syncope ( not heart related).

## 2024-08-06 NOTE — TELEPHONE ENCOUNTER
Patient called back. She would like to be seen sooner than 08/22. She has had a cardiac work up. She continues to have shortness of breath with exertion and was advised to follow up with PCP. Your last note said use SDA. Next SDA is 08/19. Can you make other recommendation.

## 2024-08-07 ENCOUNTER — MED REC SCAN ONLY (OUTPATIENT)
Dept: FAMILY MEDICINE CLINIC | Facility: CLINIC | Age: 69
End: 2024-08-07

## 2024-08-08 ENCOUNTER — OFFICE VISIT (OUTPATIENT)
Dept: FAMILY MEDICINE CLINIC | Facility: CLINIC | Age: 69
End: 2024-08-08
Payer: MEDICARE

## 2024-08-08 ENCOUNTER — OFFICE VISIT (OUTPATIENT)
Dept: INTERNAL MEDICINE CLINIC | Facility: CLINIC | Age: 69
End: 2024-08-08
Payer: MEDICARE

## 2024-08-08 VITALS
DIASTOLIC BLOOD PRESSURE: 60 MMHG | HEART RATE: 84 BPM | RESPIRATION RATE: 16 BRPM | WEIGHT: 184 LBS | HEIGHT: 62.5 IN | BODY MASS INDEX: 33.01 KG/M2 | SYSTOLIC BLOOD PRESSURE: 120 MMHG | TEMPERATURE: 97 F

## 2024-08-08 VITALS
SYSTOLIC BLOOD PRESSURE: 112 MMHG | BODY MASS INDEX: 33.01 KG/M2 | HEART RATE: 76 BPM | RESPIRATION RATE: 16 BRPM | HEIGHT: 62.5 IN | WEIGHT: 184 LBS | DIASTOLIC BLOOD PRESSURE: 66 MMHG

## 2024-08-08 DIAGNOSIS — M54.41 CHRONIC RIGHT-SIDED LOW BACK PAIN WITH RIGHT-SIDED SCIATICA: ICD-10-CM

## 2024-08-08 DIAGNOSIS — R09.89 HYPERINFLATION OF LUNGS: ICD-10-CM

## 2024-08-08 DIAGNOSIS — F43.10 PTSD (POST-TRAUMATIC STRESS DISORDER): ICD-10-CM

## 2024-08-08 DIAGNOSIS — R92.30 DENSE BREAST TISSUE: ICD-10-CM

## 2024-08-08 DIAGNOSIS — Z51.81 ENCOUNTER FOR THERAPEUTIC DRUG MONITORING: ICD-10-CM

## 2024-08-08 DIAGNOSIS — Z87.898 HISTORY OF MASTITIS: ICD-10-CM

## 2024-08-08 DIAGNOSIS — N64.4 PAIN OF LEFT BREAST: ICD-10-CM

## 2024-08-08 DIAGNOSIS — I25.10 CORONARY ARTERY DISEASE INVOLVING NATIVE CORONARY ARTERY OF NATIVE HEART WITHOUT ANGINA PECTORIS: Primary | ICD-10-CM

## 2024-08-08 DIAGNOSIS — G89.29 CHRONIC RIGHT-SIDED LOW BACK PAIN WITH RIGHT-SIDED SCIATICA: ICD-10-CM

## 2024-08-08 DIAGNOSIS — M25.551 RIGHT HIP PAIN: ICD-10-CM

## 2024-08-08 DIAGNOSIS — E66.9 OBESITY (BMI 30.0-34.9): ICD-10-CM

## 2024-08-08 DIAGNOSIS — R06.09 DYSPNEA ON EXERTION: Primary | ICD-10-CM

## 2024-08-08 PROBLEM — R68.89 GENERAL SYMPTOM: Status: ACTIVE | Noted: 2024-03-01

## 2024-08-08 PROBLEM — I47.10 SVT (SUPRAVENTRICULAR TACHYCARDIA) (HCC): Status: ACTIVE | Noted: 2022-07-18

## 2024-08-08 PROBLEM — I73.9 CLAUDICATION (HCC): Status: ACTIVE | Noted: 2023-02-22

## 2024-08-08 PROBLEM — I73.9 CLAUDICATION: Status: ACTIVE | Noted: 2023-02-22

## 2024-08-08 PROBLEM — E78.00 HYPERCHOLESTEROLEMIA: Status: ACTIVE | Noted: 2020-09-10

## 2024-08-08 PROBLEM — M85.80 OTHER SPECIFIED DISORDERS OF BONE DENSITY AND STRUCTURE, UNSPECIFIED SITE: Status: ACTIVE | Noted: 2020-09-10

## 2024-08-08 PROCEDURE — 99214 OFFICE O/P EST MOD 30 MIN: CPT | Performed by: INTERNAL MEDICINE

## 2024-08-08 PROCEDURE — G2211 COMPLEX E/M VISIT ADD ON: HCPCS | Performed by: STUDENT IN AN ORGANIZED HEALTH CARE EDUCATION/TRAINING PROGRAM

## 2024-08-08 PROCEDURE — 99214 OFFICE O/P EST MOD 30 MIN: CPT | Performed by: STUDENT IN AN ORGANIZED HEALTH CARE EDUCATION/TRAINING PROGRAM

## 2024-08-08 RX ORDER — ALBUTEROL SULFATE 90 UG/1
2 AEROSOL, METERED RESPIRATORY (INHALATION)
Qty: 18 G | Refills: 0 | Status: SHIPPED | OUTPATIENT
Start: 2024-08-08

## 2024-08-08 NOTE — PROGRESS NOTES
[facility] Note  08/08/24    Chief Complaint   Patient presents with    Shortness Of Breath     SOB upon exertion.   Had Covid in 11/23; lived in [place] during 9/11.  Ache in left breast,  Had Angio plasty on 8/2       HPI:   Katelyn Gutierrez is a 69 year old female who presents for follow up.    Her partner's office mate has covid. So patient is wearing a mask today just in case. Patient had exposure a few weeks ago.    Patient had angiogram and it was a good report.    Patient has had dyspnea on exertion with stairs.    Having some low back pain into the right leg. Was on prednisone and it did help.    Daughter came to visit patient for her birthday.    PMR flared up in May 2024.    Patient has hx of breast biopsy years ago but it was on the chest wall. Had aching on the left breast. Had some discharge on left breast as well. No rash.      Wt Readings from Last 6 Encounters:   07/29/24 185 lb (83.9 kg)   08/08/24 184 lb (83.5 kg)   08/08/24 184 lb (83.5 kg)   07/29/24 185 lb (83.9 kg)   07/30/24 185 lb (83.9 kg)   07/19/24 186 lb 9.6 oz (84.6 kg)       Past Medical History:    Abnormal pelvic exam    Bicuspid aortic valve (HCC)    Formatting of this note might be different from the original.   Formatting of this note might be different from the original.   TTE 2022 w/o change   F/u with cardiology for serial monitoring    Cervical lesion    Coronary atherosclerosis    Current mild episode of major depressive disorder without prior episode (HCC)    Formatting of this note might be different from the original.   Mood is improved.  Stable with therapy.   No si/hi.   PTSD, EMDR.   No pharmacologic rx  Formatting of this note might be different from the original.   Formatting of this note might be different from the original.   Mood is improved.  Stable with therapy.   No si/hi.   PTSD, EMDR.   No pharmacologic rx      Diabetes (HCC)    Fuchs' corneal dystrophy    Heart valve disease     High blood pressure    High cholesterol    Lyme disease    OSTEOARTHRITIS    Post-operative nausea and vomiting    pt states she often wakes up during surgery    PTSD (post-traumatic stress disorder)    9/11    Rotator cuff injury     Past Surgical History:   Procedure Laterality Date    Benign biopsy left Left 1993    Carpal tunnel release      Cath drug eluting stent      Eye surgery      Foot surgery      Hand/finger surgery unlisted Right 10/19/20--Dr. Paulo Shay    ring finger A1 pulley release/trigger finger release    Jose localization wire 1 site left (cpt=19281)      1990's bn    Other  1994    Other accessory      dissected LAD, double stented 8/11/23    Other surgical history      A RCR RIGHT SHOULDER     Other surgical history      TRIGGER RELEASE RIGHT THUMB    Other surgical history      CTR BILATERAL      Other surgical history      NEUROMA REMOVED FOOT     Repair rotator cuff,acute       Allergies   Allergen Reactions    Codeine UNKNOWN     Vomittingall pain medications  Vomittingall pain medications    Monosodium Glutamate SWELLING    Monosodium Glutamate SWELLING     swelling    Sulfa Antibiotics HIVES    Atorvastatin MYALGIA    Fentanyl NAUSEA AND VOMITING    Hydrocodone NAUSEA AND VOMITING    Hydrocodone-Acetaminophen NAUSEA AND VOMITING     vomittingall pain medications  vomittingall pain medications  vomittingall pain medications  vomittingall pain medications    Jardiance [Empagliflozin] DIZZINESS    Augmentin [Amoxicillin-Pot Clavulanate]      Myalgia  Ringing in ear    Propoxyphene UNKNOWN     Vomittingall pain medications  Vomittingall pain medications    Ezetimibe NAUSEA ONLY    Propoxyphene N-Apap NAUSEA ONLY     Most pain medications cause nausea      albuterol 108 (90 Base) MCG/ACT Inhalation Aero Soln Inhale 2 puffs into the lungs every 4 to 6 hours as needed for Wheezing. 18 g 0    L-methylfolate 15 MG Oral Tab Take 1 tablet (15 mg total) by mouth daily. 90 tablet 3    Multiple  Vitamin (MULTI-VITAMIN DAILY) Oral Tab Take by mouth daily.      Evolocumab (REPATHA SURECLICK) 140 MG/ML Subcutaneous Solution Auto-injector Inject into the skin Every 2 (two) weeks.      losartan 25 MG Oral Tab Take 1 tablet (25 mg total) by mouth daily.      aspirin 81 MG Oral Tab EC aspirin 81 mg tablet,delayed release, [RxNorm: 561035]      metFORMIN  MG Oral Tablet 24 Hr Take 1 tablet (750 mg total) by mouth daily with breakfast.      Coenzyme Q10 (CO Q-10 OR) Take by mouth 2 (two) times a day.      Omega-3 Fatty Acids (FISH OIL BURP-LESS OR) Take by mouth 2 (two) times a day.      TURMERIC OR Take by mouth daily.      prasugrel 10 MG Oral Tab Take 1 tablet (10 mg total) by mouth daily. 90 tablet 3    loteprednol 0.5 % Ophthalmic Suspension Place 1 drop into both eyes daily. 1% per pt       Social History     Socioeconomic History    Marital status: Life Partner    Number of children: 1    Years of education: 17   Occupational History    Occupation: business      Comment: [employer]    Occupation: unemployed now   Tobacco Use    Smoking status: Former     Current packs/day: 0.00     Average packs/day: 1 pack/day for 2.0 years (2.0 ttl pk-yrs)     Types: Cigarettes     Start date: 1972     Quit date: 1974     Years since quittin.2    Smokeless tobacco: Never   Vaping Use    Vaping status: Never Used   Substance and Sexual Activity    Alcohol use: Yes     Comment: occasional    Drug use: No    Sexual activity: Yes     Partners: Female, Male     Comment: patient has one child    Other Topics Concern    Caffeine Concern Yes     Comment: 1 c. coffee in the morning    Exercise Yes     Comment: 1-3 miles/day    Seat Belt Yes    Reaction to local anesthetic No     Comment: Heart palpatations at [facility]    Pt has a pacemaker No    Pt has a defibrillator No   Social History Narrative    ** Merged History Encounter **          Counseling given: Not Answered    Family  History   Problem Relation Age of Onset    Heart Disorder Father     Depression Sister     Kidney Disease Sister     Heart Disorder Maternal Grandfather     Heart Disorder Paternal Grandfather     Breast Cancer Paternal Aunt 70        Dx Breast CA age 70    Dementia Mother     Alcohol and Other Disorders Associated Mother     Bleeding Disorders Neg     Colon Cancer Neg     Ovarian Cancer Neg      Family Status   Relation Status    Fa  at age 80    Sis Alive    MGFA     PGFA     Pat Aunt Alive    Mo Alive        alzheimers    Bro Alive    MGMA     PGMA     NEG (Not Specified)        REVIEW OF SYSTEMS:   See HPI    EXAM:   /60   Pulse 84   Temp 97 °F (36.1 °C) (Temporal)   Resp 16   Ht 5' 2.5\" (1.588 m)   Wt 184 lb (83.5 kg)   LMP  (LMP Unknown)   BMI 33.12 kg/m²  Estimated body mass index is 33.12 kg/m² as calculated from the following:    Height as of this encounter: 5' 2.5\" (1.588 m).    Weight as of this encounter: 184 lb (83.5 kg).   Vital signs reviewed. Appears stated age, well groomed.  Physical Exam:  GEN:  Patient is alert and oriented x3, no apparent distress  HEAD:  Normocephalic, atraumatic  HEENT:  no scleral icterus, conjunctivae clear bilaterally, EOMI, PERRLA, OP clear  LUNGS: clear to auscultation bilaterally, no rales/rhonchi/wheezing  HEART:  Regular rate and rhythm, normal S1/S2, no murmurs, rubs or gallops  BREAST: tenderness of left breast and right breast without discrete nodule, overlying skin changes, nipple discharge  ABDOMEN:  Bowel sounds normal, soft, nondistended, nontender, no hepatosplenomegaly  EXTREMITIES:  Moves all extremities well  NEURO:  CN 2 - 12 grossly intact, gait normal      ASSESSMENT AND PLAN:   1. Dyspnea on exertion  2. Hyperinflation of lungs  Patient presents for GARCIA, noted to have hyperinflation of lungs on CXR  - will check PFT  - use albuterol as needed  - consider ICS/LABA pending PFT results  - Complete PFT;  Future  - albuterol 108 (90 Base) MCG/ACT Inhalation Aero Soln; Inhale 2 puffs into the lungs every 4 to 6 hours as needed for Wheezing.  Dispense: 18 g; Refill: 0    3. Chronic right-sided low back pain with right-sided sciatica  Patient has chronic low back pain with sciatica of right side  - will check XR lumbar   - if no contraindication on imaging will recommend physical therapy   - XR LUMBAR SPINE (MIN 4 VIEWS) (CPT=72110); Future    4. Pain of left breast  Patient has left breast pain and hx of dense breast tissue. Will check diagnostic mammogram to further evaluate.   - BRETT DIAGNOSTIC BILATERAL (CPT=77066); Future    5. Dense breast tissue  - BRETT DIAGNOSTIC BILATERAL (CPT=77066); Future    6. History of mastitis    7. Right hip pain  Will check hip XR for arthritis as well  -physical therapy pending results         Meds & Refills for this Visit:  Requested Prescriptions     Signed Prescriptions Disp Refills    albuterol 108 (90 Base) MCG/ACT Inhalation Aero Soln 18 g 0     Sig: Inhale 2 puffs into the lungs every 4 to 6 hours as needed for Wheezing.       Start Taking               albuterol 108 (90 Base) MCG/ACT Inhalation Aero Soln Inhale 2 puffs into the lungs every 4 to 6 hours as needed for Wheezing.            Health Maintenance:  Health Maintenance Due   Topic Date Due    Colorectal Cancer Screening  Never done    Zoster Vaccines (1 of 2) Never done    COVID-19 Vaccine (6 - 2023-24 season) 09/01/2023    Mammogram  09/27/2024       Patient/Caregiver Education: There are no barriers to learning. Medical education done.   Outcome: Patient verbalizes understanding. Patient is notified to call with any questions, complications, allergies, or worsening or changing symptoms.  Patient is to call with any side effects or complications from the treatments as a result of today.     Problem List:  Patient Active Problem List   Diagnosis    PTSD (post-traumatic stress disorder)    Aortic valve sclerosis    Vertigo     Hypercholesterolemia    Osteopenia    Hair loss    Trigger ring finger of right hand    History of thumb surgery    Allergic conjunctivitis of both eyes    Bilateral carotid artery stenosis    CAD (coronary artery disease)    Elevated C-reactive protein (CRP)    ESR raised    Fuchs' endothelial dystrophy    LUQ pain    Claudication (HCC)    Obesity    PMR (polymyalgia rheumatica) (HCC)    Polyarthralgia    Secondary hypercoagulable state (HCC)    Tear film insufficiency    Tachycardia    Thoracic aorta atherosclerosis (HCC)    SVT (supraventricular tachycardia) (HCC)    Other specified disorders of bone density and structure, unspecified site    General symptom    Dyspnea on exertion    Scoliosis of lumbar spine       Return for pending results.    Kenia Lamar MD  [facility]  08/08/24      Please note that portions of this note may have been completed with a voice recognition program. Efforts were made to edit the dictations but occasionally words are mis-transcribed. Thank you for your understanding.

## 2024-08-08 NOTE — PROGRESS NOTES
HISTORY OF PRESENT ILLNESS  Chief Complaint   Patient presents with    Weight Check     Down 2        Katelyn Gutierrez is a 69 year old female here for follow up in medical weight loss program.     Denies chest pain, shortness of breath, dizziness, blurred vision, headache, paresthesia, nausea/vomiting.   Reviewed cardiology visit and recommended to start blood pressure check at home   Needed a new heart jessenia scan and TTE and angiogram that was normal    Started in medrol dose pack   Continues to follow up with rheumatology and methotrexate did not help     Has been struggling with movement   Does feel hunger has been low   Reviewed protein intake and has been doing protein powder with yogurt and blue berries  Does eggs on occasion     Renny is approved   Plans to apply to prescription assistance program       Wt Readings from Last 6 Encounters:   08/08/24 184 lb (83.5 kg)   08/08/24 184 lb (83.5 kg)   07/29/24 185 lb (83.9 kg)   07/30/24 185 lb (83.9 kg)   07/19/24 186 lb 9.6 oz (84.6 kg)   06/27/24 186 lb (84.4 kg)            Breakfast Lunch Dinner Snacks Fluids   Reviewed            REVIEW OF SYSTEMS  GENERAL HEALTH: feels well otherwise, denied any fevers chills or night sweats   RESPIRATORY: denies shortness of breath   CARDIOVASCULAR: denies chest pain  GI: denies abdominal pain    EXAM  /66   Pulse 76   Resp 16   Ht 5' 2.5\" (1.588 m)   Wt 184 lb (83.5 kg)   LMP  (LMP Unknown)   BMI 33.12 kg/m²   GENERAL: well developed, well nourished,in no apparent distress, A/O x3  SKIN: no rashes,no suspicious lesions  HEENT: atraumatic, normocephalic, OP-clear, PERRL  NECK: supple,no adenopathy  LUNGS: clear to auscultation bilaterally   CARDIO: RRR without murmur  GI: good BS's,NT/ND, no masses or HSM  EXTREMITIES: no cyanosis, no clubbing, no edema    Lab Results   Component Value Date    WBC 6.7 07/29/2024    RBC 3.91 07/29/2024    HGB 12.3 07/29/2024    HCT 36.3 07/29/2024    MCV 92.8 07/29/2024     MCH 31.5 07/29/2024    MCHC 33.9 07/29/2024    RDW 13.0 07/29/2024    .0 07/29/2024     Lab Results   Component Value Date     (H) 07/29/2024    BUN 15 07/29/2024    BUNCREA 14.3 06/09/2024    CREATSERUM 0.94 07/29/2024    ANIONGAP 6 07/29/2024    GFRNAA 62 02/19/2018    GFRAA 71 02/19/2018    CA 9.8 07/29/2024    OSMOCALC 288 07/29/2024    ALKPHO 71 05/08/2024    AST 32 05/08/2024    ALT 29 05/08/2024    BILT 0.6 05/08/2024    TP 6.8 05/08/2024    ALB 3.5 05/08/2024     07/29/2024    K 4.1 07/29/2024     07/29/2024    CO2 26.0 07/29/2024     Lab Results   Component Value Date     05/31/2024    A1C 6.0 (H) 05/31/2024     Lab Results   Component Value Date    CHOLEST 212 (H) 05/31/2024    TRIG 130 05/31/2024    HDL 81 (H) 05/31/2024     (H) 05/31/2024    VLDL 22 05/31/2024    NONHDLC 131 (H) 05/31/2024     Lab Results   Component Value Date    T4F 0.8 05/08/2024    TSH 1.580 05/08/2024     No results found for: \"B12\", \"VITB12\"  Lab Results   Component Value Date    VITD 43.39 01/21/2022       Current Outpatient Medications on File Prior to Visit   Medication Sig Dispense Refill    L-methylfolate 15 MG Oral Tab Take 1 tablet (15 mg total) by mouth daily. 90 tablet 3    Multiple Vitamin (MULTI-VITAMIN DAILY) Oral Tab Take by mouth daily.      Evolocumab (REPATHA SURECLICK) 140 MG/ML Subcutaneous Solution Auto-injector Inject into the skin Every 2 (two) weeks.      losartan 25 MG Oral Tab Take 1 tablet (25 mg total) by mouth daily.      aspirin 81 MG Oral Tab EC aspirin 81 mg tablet,delayed release, [RxNorm: 744582]      metFORMIN  MG Oral Tablet 24 Hr Take 1 tablet (750 mg total) by mouth daily with breakfast.      Coenzyme Q10 (CO Q-10 OR) Take by mouth 2 (two) times a day.      Omega-3 Fatty Acids (FISH OIL BURP-LESS OR) Take by mouth 2 (two) times a day.      TURMERIC OR Take by mouth daily.      prasugrel 10 MG Oral Tab Take 1 tablet (10 mg total) by mouth daily. 90  tablet 3    loteprednol 0.5 % Ophthalmic Suspension Place 1 drop into both eyes daily. 1% per pt       No current facility-administered medications on file prior to visit.       ASSESSMENT  Analyzed weight data:       Diagnoses and all orders for this visit:    Coronary artery disease involving native coronary artery of native heart without angina pectoris    PTSD (post-traumatic stress disorder)    Obesity (BMI 30.0-34.9)    Encounter for therapeutic drug monitoring        PLAN   Consult 4/18/24 : 184 lb   Refill l methyl folate  Total time spent on chart review, pre-charting, obtaining history, counseling, and educating, reviewing labs was 30 minutes.  Reviewed shortness of breath  Completed cardiac work up   Wegovy covered but 200 per month and cost prohibitive  Reviewed prescription assistance programs   Reviewed role in CV risk reduction   High cost of medication   Reviewed prescription assistance program [employer] will assess.   Continue metformin   Continue l methyl folate  -advised of side effects and adverse effects of this medication  Nutrition: low carb diet/ recommended to eat breakfast daily/ regular protein intake  Medication use and side effects reviewed with patient.  Medication contraindications: n/a  Follow up with dietitian and psychologist as recommended.  Discussed the role of sleep and stress in weight management.  Counseled on comprehensive weight loss plan including attention to nutrition, exercise and behavior/stress management for success. See patient instruction below for more details.  Discussed strategies to overcome barriers to successful weight loss and weight maintenance  FITTE: ACSM recommendations (150-300 minutes/ week in active weight loss)   Weight Loss consent to treat reviewed and signed    There are no Patient Instructions on file for this visit.    No follow-ups on file.    Patient verbalizes understanding.    Alexandria Hassan MD  '

## 2024-08-09 ENCOUNTER — TELEPHONE (OUTPATIENT)
Dept: INTEGRATIVE MEDICINE | Facility: CLINIC | Age: 69
End: 2024-08-09

## 2024-08-09 NOTE — TELEPHONE ENCOUNTER
RN spoke with patient.     Patient had an angiogram last week and is having shortness of breath and dizziness.     Patient is having pulmonary testing done, and a diagnostic mammogram. Patient saw her primary care provider yesterday.       Patient says she has been living with these symptoms and would like to know if test results are in.     Emergency room precautions given to patient.      RN called [facility]- received partial results. Complement C4 is delayed. Uploaded results to chart for patient.     Please review results and advise.       Thanks!

## 2024-08-10 ENCOUNTER — RT VISIT (OUTPATIENT)
Dept: RESPIRATORY THERAPY | Facility: HOSPITAL | Age: 69
End: 2024-08-10
Attending: STUDENT IN AN ORGANIZED HEALTH CARE EDUCATION/TRAINING PROGRAM
Payer: MEDICARE

## 2024-08-10 DIAGNOSIS — R06.09 DYSPNEA ON EXERTION: ICD-10-CM

## 2024-08-10 DIAGNOSIS — R09.89 HYPERINFLATION OF LUNGS: ICD-10-CM

## 2024-08-10 PROCEDURE — 94010 BREATHING CAPACITY TEST: CPT | Performed by: INTERNAL MEDICINE

## 2024-08-10 PROCEDURE — 94726 PLETHYSMOGRAPHY LUNG VOLUMES: CPT | Performed by: INTERNAL MEDICINE

## 2024-08-10 PROCEDURE — 94729 DIFFUSING CAPACITY: CPT | Performed by: INTERNAL MEDICINE

## 2024-08-13 ENCOUNTER — HOSPITAL ENCOUNTER (OUTPATIENT)
Dept: GENERAL RADIOLOGY | Age: 69
Discharge: HOME | End: 2024-08-13
Attending: STUDENT IN AN ORGANIZED HEALTH CARE EDUCATION/TRAINING PROGRAM
Payer: MEDICARE

## 2024-08-13 ENCOUNTER — HOSPITAL ENCOUNTER (OUTPATIENT)
Dept: INTERVENTIONAL RADIOLOGY/VASCULAR | Facility: HOSPITAL | Age: 69
Discharge: HOME | End: 2024-08-13
Attending: INTERNAL MEDICINE
Payer: MEDICARE

## 2024-08-13 DIAGNOSIS — M54.41 CHRONIC RIGHT-SIDED LOW BACK PAIN WITH RIGHT-SIDED SCIATICA: ICD-10-CM

## 2024-08-13 DIAGNOSIS — G89.29 CHRONIC RIGHT-SIDED LOW BACK PAIN WITH RIGHT-SIDED SCIATICA: ICD-10-CM

## 2024-08-13 DIAGNOSIS — Z01.818 PRE-OP TESTING: Primary | ICD-10-CM

## 2024-08-13 DIAGNOSIS — M25.551 RIGHT HIP PAIN: ICD-10-CM

## 2024-08-13 PROCEDURE — 73502 X-RAY EXAM HIP UNI 2-3 VIEWS: CPT | Performed by: STUDENT IN AN ORGANIZED HEALTH CARE EDUCATION/TRAINING PROGRAM

## 2024-08-13 PROCEDURE — 73503 X-RAY EXAM HIP UNI 4/> VIEWS: CPT | Performed by: STUDENT IN AN ORGANIZED HEALTH CARE EDUCATION/TRAINING PROGRAM

## 2024-08-13 PROCEDURE — 72110 X-RAY EXAM L-2 SPINE 4/>VWS: CPT | Performed by: STUDENT IN AN ORGANIZED HEALTH CARE EDUCATION/TRAINING PROGRAM

## 2024-08-14 DIAGNOSIS — M54.41 CHRONIC RIGHT-SIDED LOW BACK PAIN WITH RIGHT-SIDED SCIATICA: ICD-10-CM

## 2024-08-14 DIAGNOSIS — M25.551 RIGHT HIP PAIN: Primary | ICD-10-CM

## 2024-08-14 DIAGNOSIS — G89.29 CHRONIC RIGHT-SIDED LOW BACK PAIN WITH RIGHT-SIDED SCIATICA: ICD-10-CM

## 2024-08-14 DIAGNOSIS — M16.0 OSTEOARTHRITIS OF BOTH HIPS, UNSPECIFIED OSTEOARTHRITIS TYPE: ICD-10-CM

## 2024-08-14 DIAGNOSIS — M43.16 ANTEROLISTHESIS OF LUMBAR SPINE: ICD-10-CM

## 2024-08-14 DIAGNOSIS — R06.09 DYSPNEA ON EXERTION: Primary | ICD-10-CM

## 2024-08-14 DIAGNOSIS — M41.9 SCOLIOSIS OF LUMBAR SPINE, UNSPECIFIED SCOLIOSIS TYPE: Primary | ICD-10-CM

## 2024-08-14 NOTE — PROCEDURES
Pulmonary Function Test:   Findings:  Spirometry: FEV1 is 2.22 L, 112% predicted.  With a Z-score of 0.68  FVC is 2.90 L, 115% predicted and FEV1/ FVC ratio is 0.77 percent.  The flow-volume loop demonstrates a normal pattern.   Lung Volumes:                                                                     The TLC is 4.81753 L, 104% predicted.  Z-score of +0.29  The residual volume 1.92 L, 106% predicted.  Diffusion Capacity:  The diffusion capacity is 18.03 or 100% predicted and 98% predicted when corrected for alveolar volume.     Impression:  There is no airway obstruction or restrictive process  on spirometry and visualized on flow-volume loop.    Lung volumes appear within normal limits.predicted).     Diffusion capacity appears within normal limits  There are no previous pulmonary function tests available for comparison.     The above testing demonstrates normal pulmonary function. Given the associated history of dyspnea on exertion,, may consider further assessment with bronchoprovocation challenge test such as mannitol challenge test to evaluate for asthma or reactive airways.   Disclaimer: This PFT has been interpreted in accordance to ATS/ERS interpretation guidelines 2022, with the use of upper and lower limits of normal as well as z-score references. Previous testing (before March 2024) was not performed at [facility] using z-scores and so comparison to previous testing should be taken with caution.    Colin Harkins MD

## 2024-08-15 ENCOUNTER — HOSPITAL ENCOUNTER (OUTPATIENT)
Dept: MAMMOGRAPHY | Facility: HOSPITAL | Age: 69
Discharge: HOME | End: 2024-08-15
Attending: STUDENT IN AN ORGANIZED HEALTH CARE EDUCATION/TRAINING PROGRAM
Payer: MEDICARE

## 2024-08-15 ENCOUNTER — ANCILLARY ORDERS (OUTPATIENT)
Dept: FAMILY MEDICINE CLINIC | Facility: CLINIC | Age: 69
End: 2024-08-15

## 2024-08-15 ENCOUNTER — LAB ENCOUNTER (OUTPATIENT)
Dept: LAB | Age: 69
End: 2024-08-15
Attending: INTERNAL MEDICINE
Payer: MEDICARE

## 2024-08-15 DIAGNOSIS — M54.41 CHRONIC RIGHT-SIDED LOW BACK PAIN WITH RIGHT-SIDED SCIATICA: ICD-10-CM

## 2024-08-15 DIAGNOSIS — R06.09 DYSPNEA ON EXERTION: Primary | ICD-10-CM

## 2024-08-15 DIAGNOSIS — R92.30 DENSE BREAST TISSUE: ICD-10-CM

## 2024-08-15 DIAGNOSIS — N64.4 PAIN OF LEFT BREAST: ICD-10-CM

## 2024-08-15 DIAGNOSIS — N63.14 BREAST LUMP ON RIGHT SIDE AT 4 O'CLOCK POSITION: ICD-10-CM

## 2024-08-15 DIAGNOSIS — G89.29 CHRONIC RIGHT-SIDED LOW BACK PAIN WITH RIGHT-SIDED SCIATICA: ICD-10-CM

## 2024-08-15 DIAGNOSIS — R09.89 HYPERINFLATION OF LUNGS: ICD-10-CM

## 2024-08-15 DIAGNOSIS — M35.3 POLYMYALGIA RHEUMATICA (HCC): Primary | ICD-10-CM

## 2024-08-15 DIAGNOSIS — M25.551 RIGHT HIP PAIN: ICD-10-CM

## 2024-08-15 DIAGNOSIS — N64.52 NIPPLE DISCHARGE: ICD-10-CM

## 2024-08-15 DIAGNOSIS — Z87.898 HISTORY OF MASTITIS: ICD-10-CM

## 2024-08-15 LAB
CRP SERPL-MCNC: 0.6 MG/DL (ref ?–0.5)
ERYTHROCYTE [SEDIMENTATION RATE] IN BLOOD: 29 MM/HR

## 2024-08-15 PROCEDURE — 76642 ULTRASOUND BREAST LIMITED: CPT | Performed by: STUDENT IN AN ORGANIZED HEALTH CARE EDUCATION/TRAINING PROGRAM

## 2024-08-15 PROCEDURE — 85652 RBC SED RATE AUTOMATED: CPT

## 2024-08-15 PROCEDURE — 86140 C-REACTIVE PROTEIN: CPT

## 2024-08-15 PROCEDURE — 77062 BREAST TOMOSYNTHESIS BI: CPT | Performed by: STUDENT IN AN ORGANIZED HEALTH CARE EDUCATION/TRAINING PROGRAM

## 2024-08-15 PROCEDURE — 77066 DX MAMMO INCL CAD BI: CPT | Performed by: STUDENT IN AN ORGANIZED HEALTH CARE EDUCATION/TRAINING PROGRAM

## 2024-08-16 ENCOUNTER — LAB ENCOUNTER (OUTPATIENT)
Dept: LAB | Age: 69
End: 2024-08-16
Attending: STUDENT IN AN ORGANIZED HEALTH CARE EDUCATION/TRAINING PROGRAM
Payer: MEDICARE

## 2024-08-16 ENCOUNTER — TELEPHONE (OUTPATIENT)
Dept: FAMILY MEDICINE CLINIC | Facility: CLINIC | Age: 69
End: 2024-08-16

## 2024-08-16 DIAGNOSIS — M25.559 HIP PAIN, UNSPECIFIED LATERALITY: Primary | ICD-10-CM

## 2024-08-16 DIAGNOSIS — N64.52 NIPPLE DISCHARGE: ICD-10-CM

## 2024-08-16 PROCEDURE — 84146 ASSAY OF PROLACTIN: CPT

## 2024-08-16 NOTE — TELEPHONE ENCOUNTER
I put in physical therapy referral 8/14/24. Is she able to schedule the mannitol challenge test first? That will give us more information. Thank you.

## 2024-08-16 NOTE — TELEPHONE ENCOUNTER
Pt states previously discussed with Dr Lamar option of physical therapy for her hip, pt is asking how to start PT at this time? Referral? Pt does not have specific location she would g/t.    Pt also notes that she cannot get in to see a pulmonologist until November, is asking if this is ok/ if there is a way to get her in sooner?

## 2024-08-16 NOTE — TELEPHONE ENCOUNTER
Patient informed of below and she states she will call today to schedule the Mannitol test.  States she will try to schedule the mannitol prior to seeing the pulmonologist and doing PT.

## 2024-08-17 LAB — PROLACTIN SERPL-MCNC: 4.4 NG/ML

## 2024-08-22 ENCOUNTER — RT VISIT (OUTPATIENT)
Dept: RESPIRATORY THERAPY | Facility: HOSPITAL | Age: 69
End: 2024-08-22
Attending: STUDENT IN AN ORGANIZED HEALTH CARE EDUCATION/TRAINING PROGRAM
Payer: MEDICARE

## 2024-08-22 DIAGNOSIS — R06.09 DYSPNEA ON EXERTION: ICD-10-CM

## 2024-08-22 PROCEDURE — 94070 EVALUATION OF WHEEZING: CPT | Performed by: INTERNAL MEDICINE

## 2024-08-28 NOTE — PROCEDURES
Patient is a 69-year-old female being assessed with a diagnosis of dyspnea on exertion.    Patient had undergone pulmonary function testing that were normal.  Test performed now is mannitol challenge.    Results  Patient's FEV1 2.06 L at baseline.  Following the inhalation of incrementally increasing concentrations of mannitol there was no significant change in FEV1 with lowest drop of 4.3% change.  Following the inhalation of bronchodilator no significant change.    Impression--negative mannitol challenge

## 2024-09-04 NOTE — PROGRESS NOTES
HISTORY OF PRESENT ILLNESS  Chief Complaint   Patient presents with    Weight Check     UP 3        Katelyn Gutierrez is a 69 year old female here for follow up in medical weight loss program.     Denies chest pain, shortness of breath, dizziness, blurred vision, headache, paresthesia, nausea/vomiting.   Plans to fill out form for prescription assitance program.   Continues to struggle of breath at times   Up 3 lb from previous   Still healing from her last angiogram   Still struggling with shortness of breath  Having some spinal issues as welll  Having tailbone curving and pain with walking   In lots of discomfort / struggling with car ride      Wt Readings from Last 6 Encounters:   09/11/24 184 lb (83.5 kg)   09/05/24 187 lb (84.8 kg)   07/29/24 185 lb (83.9 kg)   08/08/24 184 lb (83.5 kg)   08/08/24 184 lb (83.5 kg)   07/29/24 185 lb (83.9 kg)            Breakfast Lunch Dinner Snacks Fluids   Reviewed            REVIEW OF SYSTEMS  GENERAL HEALTH: feels well otherwise, denied any fevers chills or night sweats   RESPIRATORY: denies shortness of breath   CARDIOVASCULAR: denies chest pain  GI: denies abdominal pain    EXAM  /78   Pulse 67   Resp 16   Ht 5' 2.5\" (1.588 m)   Wt 187 lb (84.8 kg)   LMP  (LMP Unknown)   BMI 33.66 kg/m²   GENERAL: well developed, well nourished,in no apparent distress, A/O x3  SKIN: no rashes,no suspicious lesions  HEENT: atraumatic, normocephalic, OP-clear, PERRL  NECK: supple,no adenopathy  LUNGS: clear to auscultation bilaterally   CARDIO: RRR without murmur  GI: good BS's,NT/ND, no masses or HSM  EXTREMITIES: no cyanosis, no clubbing, no edema    Lab Results   Component Value Date    WBC 6.7 07/29/2024    RBC 3.91 07/29/2024    HGB 12.3 07/29/2024    HCT 36.3 07/29/2024    MCV 92.8 07/29/2024    MCH 31.5 07/29/2024    MCHC 33.9 07/29/2024    RDW 13.0 07/29/2024    .0 07/29/2024     Lab Results   Component Value Date     (H) 07/29/2024    BUN 15 07/29/2024     BUNCREA 14.3 06/09/2024    CREATSERUM 0.94 07/29/2024    ANIONGAP 6 07/29/2024    GFRNAA 62 02/19/2018    GFRAA 71 02/19/2018    CA 9.8 07/29/2024    OSMOCALC 288 07/29/2024    ALKPHO 71 05/08/2024    AST 32 05/08/2024    ALT 29 05/08/2024    BILT 0.6 05/08/2024    TP 6.8 05/08/2024    ALB 3.5 05/08/2024     07/29/2024    K 4.1 07/29/2024     07/29/2024    CO2 26.0 07/29/2024     Lab Results   Component Value Date     09/05/2024    A1C 5.9 (H) 09/05/2024     Lab Results   Component Value Date    CHOLEST 212 (H) 05/31/2024    TRIG 130 05/31/2024    HDL 81 (H) 05/31/2024     (H) 05/31/2024    VLDL 22 05/31/2024    NONHDLC 131 (H) 05/31/2024     Lab Results   Component Value Date    T4F 0.8 05/08/2024    TSH 1.580 05/08/2024     No results found for: \"B12\", \"VITB12\"  Lab Results   Component Value Date    VITD 43.39 01/21/2022       Current Outpatient Medications on File Prior to Visit   Medication Sig Dispense Refill    predniSONE 5 MG Oral Tab       albuterol 108 (90 Base) MCG/ACT Inhalation Aero Soln Inhale 2 puffs into the lungs every 4 to 6 hours as needed for Wheezing. 18 g 0    L-methylfolate 15 MG Oral Tab Take 1 tablet (15 mg total) by mouth daily. 90 tablet 3    Multiple Vitamin (MULTI-VITAMIN DAILY) Oral Tab Take by mouth daily.      Evolocumab (REPATHA SURECLICK) 140 MG/ML Subcutaneous Solution Auto-injector Inject into the skin Every 2 (two) weeks.      losartan 25 MG Oral Tab Take 1 tablet (25 mg total) by mouth daily.      aspirin 81 MG Oral Tab EC aspirin 81 mg tablet,delayed release, [RxNorm: 292710]      metFORMIN  MG Oral Tablet 24 Hr Take 1 tablet (750 mg total) by mouth daily with breakfast.      Coenzyme Q10 (CO Q-10 OR) Take by mouth 2 (two) times a day.      Omega-3 Fatty Acids (FISH OIL BURP-LESS OR) Take by mouth 2 (two) times a day.      TURMERIC OR Take by mouth daily.      prasugrel 10 MG Oral Tab Take 1 tablet (10 mg total) by mouth daily. 90 tablet 3     loteprednol 0.5 % Ophthalmic Suspension Place 1 drop into both eyes daily. 1% per pt       No current facility-administered medications on file prior to visit.       ASSESSMENT  Analyzed weight data:       Diagnoses and all orders for this visit:    Coronary artery disease involving native coronary artery of native heart without angina pectoris  -     Hemoglobin A1C; Future    PTSD (post-traumatic stress disorder)  -     Hemoglobin A1C; Future    Obesity (BMI 30.0-34.9)  -     Hemoglobin A1C; Future    At risk for cardiovascular event  -     Hemoglobin A1C; Future    Encounter for therapeutic drug monitoring  -     Hemoglobin A1C; Future    Aortic valve sclerosis  -     Hemoglobin A1C; Future    Thoracic aorta atherosclerosis (HCC)  -     Hemoglobin A1C; Future    Prediabetes  -     Hemoglobin A1C; Future          PLAN   Consult 4/18/24 : 184 lb   Refill l methyl folate  Total time spent on chart review, pre-charting, obtaining history, counseling, and educating, reviewing labs was 30 minutes.  Reviewed shortness of breath  Completed cardiac work up   Wegovy covered but 200 per month and cost prohibitive  Reviewed prescription assistance programs   Reviewed role in CV risk reduction   High cost of medication   Reviewed prescription assistance program Wegovy will assess not available   Continue metformin   Continue l methyl folate  Check A1c   -advised of side effects and adverse effects of this medication  Nutrition: low carb diet/ recommended to eat breakfast daily/ regular protein intake  Medication use and side effects reviewed with patient.  Medication contraindications: n/a  Follow up with dietitian and psychologist as recommended.  Discussed the role of sleep and stress in weight management.  Counseled on comprehensive weight loss plan including attention to nutrition, exercise and behavior/stress management for success. See patient instruction below for more details.  Discussed strategies to overcome barriers  to successful weight loss and weight maintenance  FITTE: ACSM recommendations (150-300 minutes/ week in active weight loss)   Weight Loss consent to treat reviewed and signed    There are no Patient Instructions on file for this visit.    No follow-ups on file.    Patient verbalizes understanding.    Alexandria Hassan MD  '    Answers submitted by the patient for this visit:  Medical Weight Loss Follow Up (Submitted on 9/4/2024)  If greater than 5/1O how would you grade your coping mechanisms?: fair

## 2024-09-05 ENCOUNTER — LAB ENCOUNTER (OUTPATIENT)
Dept: LAB | Age: 69
End: 2024-09-05
Attending: INTERNAL MEDICINE
Payer: MEDICARE

## 2024-09-05 ENCOUNTER — OFFICE VISIT (OUTPATIENT)
Dept: INTERNAL MEDICINE CLINIC | Facility: CLINIC | Age: 69
End: 2024-09-05
Payer: MEDICARE

## 2024-09-05 VITALS
BODY MASS INDEX: 33.55 KG/M2 | RESPIRATION RATE: 16 BRPM | HEIGHT: 62.5 IN | HEART RATE: 67 BPM | WEIGHT: 187 LBS | SYSTOLIC BLOOD PRESSURE: 132 MMHG | DIASTOLIC BLOOD PRESSURE: 78 MMHG

## 2024-09-05 DIAGNOSIS — R73.03 PREDIABETES: ICD-10-CM

## 2024-09-05 DIAGNOSIS — I70.0 THORACIC AORTA ATHEROSCLEROSIS (HCC): ICD-10-CM

## 2024-09-05 DIAGNOSIS — I25.10 CORONARY ARTERY DISEASE INVOLVING NATIVE CORONARY ARTERY OF NATIVE HEART WITHOUT ANGINA PECTORIS: ICD-10-CM

## 2024-09-05 DIAGNOSIS — E66.9 OBESITY (BMI 30.0-34.9): ICD-10-CM

## 2024-09-05 DIAGNOSIS — F43.10 PTSD (POST-TRAUMATIC STRESS DISORDER): ICD-10-CM

## 2024-09-05 DIAGNOSIS — Z51.81 ENCOUNTER FOR THERAPEUTIC DRUG MONITORING: ICD-10-CM

## 2024-09-05 DIAGNOSIS — Z91.89 AT RISK FOR CARDIOVASCULAR EVENT: ICD-10-CM

## 2024-09-05 DIAGNOSIS — I35.8 AORTIC VALVE SCLEROSIS: ICD-10-CM

## 2024-09-05 DIAGNOSIS — I25.10 CORONARY ARTERY DISEASE INVOLVING NATIVE CORONARY ARTERY OF NATIVE HEART WITHOUT ANGINA PECTORIS: Primary | ICD-10-CM

## 2024-09-05 LAB
EST. AVERAGE GLUCOSE BLD GHB EST-MCNC: 123 MG/DL (ref 68–126)
HBA1C MFR BLD: 5.9 % (ref ?–5.7)

## 2024-09-05 PROCEDURE — 83036 HEMOGLOBIN GLYCOSYLATED A1C: CPT

## 2024-09-05 PROCEDURE — 36415 COLL VENOUS BLD VENIPUNCTURE: CPT

## 2024-09-05 PROCEDURE — 99214 OFFICE O/P EST MOD 30 MIN: CPT | Performed by: INTERNAL MEDICINE

## 2024-09-05 RX ORDER — PREDNISONE 5 MG/1
TABLET ORAL
COMMUNITY
Start: 2024-08-22

## 2024-09-09 ENCOUNTER — PATIENT MESSAGE (OUTPATIENT)
Dept: INTERNAL MEDICINE CLINIC | Facility: CLINIC | Age: 69
End: 2024-09-09

## 2024-09-09 NOTE — TELEPHONE ENCOUNTER
From: Katelyn Gutierrez  To: Alexandria Hassan  Sent: 9/9/2024 1:13 PM CDT  Subject: My confusion over Wegovy     Hello Dr. Hassan,  I was not aware of the difference between Ozempic and wegovy and how Medicare and my Pharmacy Part D providers manage the drugs. I found the reocord of when I had ozempic under Dr. Luu: it was June 23, 2022 to be exact, I was prescribed Ozmepic for a short time as the rules changed and I could not be on ozempic. Now, due to my A1C is looking very good (which I am glad to see the improvement), I would have to have wegovy but it will cost me $339.32 for a month supply at which time I can not afford. As another option, I was considering taking a double dose of Metformin (Im currently on 750 mg/daily) but want to make sure you are ok with this and that it will be ok, ok?

## 2024-09-11 ENCOUNTER — OFFICE VISIT (OUTPATIENT)
Facility: CLINIC | Age: 69
End: 2024-09-11
Payer: MEDICARE

## 2024-09-11 VITALS
BODY MASS INDEX: 33.01 KG/M2 | RESPIRATION RATE: 16 BRPM | HEART RATE: 75 BPM | WEIGHT: 184 LBS | SYSTOLIC BLOOD PRESSURE: 108 MMHG | DIASTOLIC BLOOD PRESSURE: 60 MMHG | HEIGHT: 62.5 IN | OXYGEN SATURATION: 98 %

## 2024-09-11 DIAGNOSIS — R06.09 DOE (DYSPNEA ON EXERTION): Primary | ICD-10-CM

## 2024-09-11 PROCEDURE — 99204 OFFICE O/P NEW MOD 45 MIN: CPT | Performed by: INTERNAL MEDICINE

## 2024-09-11 RX ORDER — PREDNISONE 1 MG/1
4 TABLET ORAL DAILY
COMMUNITY
Start: 2024-08-22

## 2024-09-11 NOTE — H&P
[facility] General Pulmonary Consult Note    History of Present Illness:  Katelyn Gutierrez is a 69 year old female former minimal smoker (quit 1974, 2 pack years) with significant PMH of CAD, LAD dissection s/p stenting, DM, PMR on prednisone who presents today for evaluation of dyspnea on exertion. She reports having had dyspnea and syncopal symptoms and underwent cardiac testing including LHC with stenting 8/2023.  She had some improvement in sx however she continues to have dyspnea on exertion, especially when carrying a load. Still with episodes of pre- and syncope.   Seen by cardiology and told her haert testing was normal  She notes occasional cough typically at night when laying flat.   No wheeze. No chest congestion. No chest pain    Past Medical History:   Past Medical History:    Abnormal pelvic exam    Bicuspid aortic valve (HCC)    Formatting of this note might be different from the original.   Formatting of this note might be different from the original.   TTE 2022 w/o change   F/u with cardiology for serial monitoring    Cervical lesion    Coronary atherosclerosis    Current mild episode of major depressive disorder without prior episode (HCC)    Formatting of this note might be different from the original.   Mood is improved.  Stable with therapy.   No si/hi.   PTSD, EMDR.   No pharmacologic rx  Formatting of this note might be different from the original.   Formatting of this note might be different from the original.   Mood is improved.  Stable with therapy.   No si/hi.   PTSD, EMDR.   No pharmacologic rx      Diabetes (HCC)    Fuchs' corneal dystrophy    Heart valve disease    High blood pressure    High cholesterol    Lyme disease    OSTEOARTHRITIS    Post-operative nausea and vomiting    pt states she often wakes up during surgery    PTSD (post-traumatic stress disorder)    9/11    Rotator cuff injury        Past Surgical History:   Past Surgical History:   Procedure Laterality Date    Benign biopsy left  Left     Carpal tunnel release      Cath drug eluting stent      Eye surgery      Foot surgery      Hand/finger surgery unlisted Right 10/19/20--Dr. Paulo Shay    ring finger A1 pulley release/trigger finger release    Jose localization wire 1 site left (cpt=19281)       bn    Other      Other accessory      dissected LAD, double stented 23    Other surgical history      A RCR RIGHT SHOULDER     Other surgical history      TRIGGER RELEASE RIGHT THUMB    Other surgical history      CTR BILATERAL      Other surgical history      NEUROMA REMOVED FOOT     Repair rotator cuff,acute         Family Medical History:   Family History   Problem Relation Age of Onset    Heart Disorder Father     Depression Sister     Kidney Disease Sister     Heart Disorder Maternal Grandfather     Heart Disorder Paternal Grandfather     Breast Cancer Paternal Aunt 70        Dx Breast CA age 70    Dementia Mother     Alcohol and Other Disorders Associated Mother     Bleeding Disorders Neg     Colon Cancer Neg     Ovarian Cancer Neg         Social History:   Social History     Socioeconomic History    Marital status: Life Partner     Spouse name: Not on file    Number of children: 1    Years of education: 17    Highest education level: Not on file   Occupational History    Occupation: business      Comment: [employer]    Occupation: unemployed now   Tobacco Use    Smoking status: Former     Current packs/day: 0.00     Average packs/day: 1 pack/day for 2.0 years (2.0 ttl pk-yrs)     Types: Cigarettes     Start date: 1972     Quit date: 1974     Years since quittin.3    Smokeless tobacco: Never   Vaping Use    Vaping status: Never Used   Substance and Sexual Activity    Alcohol use: Yes     Comment: occasional    Drug use: No    Sexual activity: Yes     Partners: Female, Male     Comment: patient has one child    Other Topics Concern     Service Not Asked    Blood  Transfusions Not Asked    Caffeine Concern Yes     Comment: 1 c. coffee in the morning    Occupational Exposure Not Asked    Hobby Hazards Not Asked    Sleep Concern Not Asked    Stress Concern Not Asked    Weight Concern Not Asked    Special Diet Not Asked    Back Care Not Asked    Exercise Yes     Comment: 1-3 miles/day    Bike Helmet Not Asked    Seat Belt Yes    Self-Exams Not Asked    Grew up on a farm Not Asked    History of tanning Not Asked    Outdoor occupation Not Asked    Breast feeding Not Asked    Reaction to local anesthetic No     Comment: Heart palpatations at [facility]    Pt has a pacemaker No    Pt has a defibrillator No   Social History Narrative    ** Merged History Encounter **          Social Determinants of Health     Financial Resource Strain: Not on file   Food Insecurity: Not on file   Transportation Needs: Not on file   Physical Activity: Not on file   Stress: Not on file   Social Connections: Not on file   Housing Stability: Not on file        Allergies: Codeine, Monosodium glutamate, Monosodium glutamate, Sulfa antibiotics, Atorvastatin, Fentanyl, Hydrocodone, Hydrocodone-acetaminophen, Jardiance [empagliflozin], Augmentin [amoxicillin-pot clavulanate], Propoxyphene, Ezetimibe, and Propoxyphene n-apap     Medications:   Current Outpatient Medications   Medication Sig Dispense Refill    predniSONE 1 MG Oral Tab Take 4 tablets (4 mg total) by mouth daily.      albuterol 108 (90 Base) MCG/ACT Inhalation Aero Soln Inhale 2 puffs into the lungs every 4 to 6 hours as needed for Wheezing. 18 g 0    L-methylfolate 15 MG Oral Tab Take 1 tablet (15 mg total) by mouth daily. 90 tablet 3    Multiple Vitamin (MULTI-VITAMIN DAILY) Oral Tab Take by mouth daily.      Evolocumab (REPATHA SURECLICK) 140 MG/ML Subcutaneous Solution Auto-injector Inject into the skin Every 2 (two) weeks.      losartan 25 MG Oral Tab Take 1 tablet (25 mg total) by mouth daily.      aspirin 81 MG Oral Tab EC aspirin 81 mg  tablet,delayed release, [RxNorm: 800054]      metFORMIN  MG Oral Tablet 24 Hr Take 1 tablet (750 mg total) by mouth daily with breakfast.      Coenzyme Q10 (CO Q-10 OR) Take by mouth 2 (two) times a day.      Omega-3 Fatty Acids (FISH OIL BURP-LESS OR) Take by mouth 2 (two) times a day.      TURMERIC OR Take by mouth daily.      prasugrel 10 MG Oral Tab Take 1 tablet (10 mg total) by mouth daily. 90 tablet 3    loteprednol 0.5 % Ophthalmic Suspension Place 1 drop into both eyes daily. 1% per pt      predniSONE 5 MG Oral Tab          Review of Systems: Review of Systems   Constitutional: Negative.    HENT: Negative.     Respiratory:  Positive for shortness of breath. Negative for wheezing and stridor.    Cardiovascular: Negative.        Physical Exam:  /60 (BP Location: Left arm, Patient Position: Sitting, Cuff Size: adult)   Pulse 75   Resp 16   Ht 5' 2.5\" (1.588 m)   Wt 184 lb (83.5 kg)   LMP  (LMP Unknown)   SpO2 98%   BMI 33.12 kg/m²      Constitutional: alert, cooperative. No acute distress.  HEENT: Head NC/AT.    Cardio: Regular rate and rhythm. Normal S1 and S2. No murmurs.   Respiratory: Thorax symmetrical with no labored breathing. Clear to ausculation bilaterally with symmetrical breath sounds. No wheezing, rhonchi, rales, or crackles.   GI: NABS. Abd soft, non-tender.  Extremities: No clubbing or cyanosis.no BLE edema.    Neurologic: A&Ox3. No gross motor deficits.  Skin: Warm, dry  Psych: Calm, cooperative. Pleasant affect.    Results:  Personally reviewed  WBC: 6.7, done on 7/29/2024.  HGB: 12.3, done on 7/29/2024.  PLT: 252, done on 7/29/2024.     Glucose: 115, done on 7/29/2024.  Cr: 0.94, done on 7/29/2024.  Last eGFR was 65 on 7/29/2024.  CA: 9.8, done on 7/29/2024.  Na: 138, done on 7/29/2024.  K: 4.1, done on 7/29/2024.  Cl: 106, done on 7/29/2024.  CO2: 26, done on 7/29/2024.  Last ALB was 3.5% done on 5/8/2024.     [facility] MIS 2D+3D DIAGNOSTIC [facility]  BILAT  (CPT=77066/22040)    Result Date: 8/15/2024  CONCLUSION:  Stable mammogram.  Patient complains of lateral left breast pain.  There is no mammographic or sonographic abnormality to explain patient's breast pain.  Patient complains of 1 episode of white nipple discharge from the left breast.  Targeted ultrasound retroareolar left breast demonstrates several mildly dilated ducts without intraductal filling defect.  If patient's discharge symptoms persist, consider surgical consultation or breast MRI for further evaluation.  BI-RADS CATEGORY:  DIAGNOSTIC CATEGORY 2--BENIGN FINDING:   RECOMMENDATIONS:  CLINICAL EVALUATION.   Because of breast density, this patient may benefit from supplemental screening with breast MRI, Molecular Breast Imaging (MBI) or bilateral whole breast ultrasound for increased sensitivity for detection of cancer which can be obscured mammographically.   Please contact your ordering provider to discuss supplemental screening options.   A letter explaining the results in lay terms has been sent to the patient.  This exam was evaluated with a computer-aided device.  This patient's information has been entered into a reminder system with a target due date for the next mammogram.   LOCATION:  [facility]   Dictated by (CST): Li Tavarez MD on 8/15/2024 at 2:11 PM     Finalized by (CST): Li Tavarez MD on 8/15/2024 at 2:14 PM       US BREAST LEFT LIMITED (BRETT SAME DAY US)(AEM=[id number])    Result Date: 8/15/2024  CONCLUSION:  **PLEASE SEE REPORT FOR DIAGNOSTIC MAMMOGRAM**   LOCATION:  [facility]            Dictated by (CST): Li Tavarez MD on 8/15/2024 at 1:56 PM     Finalized by (CST): Li Tavarez MD on 8/15/2024 at 1:56 PM       XR HIP W OR WO PELVIS 2 OR 3 VIEWS, RIGHT (CPT=73502)    Result Date: 8/13/2024  CONCLUSION:  Hip arthritis, left greater than right.   LOCATION:  [facility]   Dictated by (CST): Francisco Jones MD on 8/13/2024 at 2:45 PM     Finalized by (CST): Francisco Jones MD on 8/13/2024 at 2:46 PM        XR LUMBAR SPINE (MIN 4 VIEWS) (CPT=72110)    Result Date: 8/13/2024  CONCLUSION:  1. Scoliotic and degenerative changes. 2. Grade 1 anterolisthesis L3-L4 and L4-L5.    LOCATION:  [facility]   Dictated by (CST): Francisco Jones MD on 8/13/2024 at 2:43 PM     Finalized by (CST): Francisco Jones MD on 8/13/2024 at 2:45 PM       XR CHEST PA + LAT CHEST (CPT=71046)    Result Date: 7/29/2024  CONCLUSION:  No acute cardiopulmonary process.   LOCATION:  [id number]   Dictated by (CST): Li Tavarez MD on 7/29/2024 at 3:57 PM     Finalized by (CST): Li Tavarez MD on 7/29/2024 at 3:58 PM         Assessment/Plan:  #1. Dyspnea on exertion  Ongoing since 8/2023 and found to have LAD dissection (healed) s/p stent placement  Most suspicious for cardiac etiology at present given her associated symptoms of GARCIA and pre-/syncope.    Her PFTs and mannitol challenge testing was negative  Obtain CPET    #2. syncope  As above      Jesus Chew MD  9/11/2024

## 2024-09-11 NOTE — PATIENT INSTRUCTIONS
Obtain a cardiopulmonary exercise test. Call central scheduling at [phone] to schedule the test  I will plan to review your scan once you get it done and contact you.  If you don't hear anything after a day or two, then please contact me

## 2024-09-16 ENCOUNTER — OFFICE VISIT (OUTPATIENT)
Dept: PHYSICAL THERAPY | Age: 69
End: 2024-09-16
Attending: STUDENT IN AN ORGANIZED HEALTH CARE EDUCATION/TRAINING PROGRAM
Payer: MEDICARE

## 2024-09-16 DIAGNOSIS — M16.0 OSTEOARTHRITIS OF BOTH HIPS, UNSPECIFIED OSTEOARTHRITIS TYPE: ICD-10-CM

## 2024-09-16 DIAGNOSIS — M25.551 RIGHT HIP PAIN: Primary | ICD-10-CM

## 2024-09-16 PROCEDURE — 97162 PT EVAL MOD COMPLEX 30 MIN: CPT

## 2024-09-16 PROCEDURE — 97110 THERAPEUTIC EXERCISES: CPT

## 2024-09-16 NOTE — PROGRESS NOTES
PHYSICAL THERAPY EVALUATION:   Referring Diagnosis:  Scoliosis of lumbar spine, unspecified scoliosis type (M41.9); Chronic right-sided low back pain with right-sided sciatica (M54.41,G89.29);Anterolisthesis of lumbar spine (M43.16)   Referring Provider: Brianda Date of Evaluation: 2024   Precautions: ”None” Next MD visit: none scheduled   Date of Surgery: n/a      PATIENT SUMMARY   Katelyn Gutierrez is a 69 year old female who presents to clinic today with complaints of right sided LBP with right lower extremity pain. Onset a few months ago, recently worsened. Doesn't recall any trauma or significant change in activity prior to onset. Also reports shortness of breath with activity, consulting with pulmonologist  for these symptoms, needs to schedule stress test. Had 2 stents Aug 2024. Recently, had PET scan and angiogram 2025, WNL. Taking prednisone for polymyalgia rheumatica - has been taking medication intermittently since May 2023. Denies numbness, falling, bowel/bladder changes.     Right Gluteal  Location: R proximal gluteal region; can radiate into right posterior thigh/lower leg  Description: ache  Current 1/10, Worst 8/10, Best 1/10.   24 Hour Pattern: none  Aggravating: WB, walking, standing, walking incline , sit >2 hours  Alleviating: resting/laying down    Tingling  Location: right plantar heel into plantar foot   Description: tingling  Current 0/10, Worst 4/10, Best 0/10.   24 Hour Pattern: none  Aggravating: walking     Current Functional Limitations: unable to go on hikes/long walks around neighborhood, unable to sit for long car drives without pain,   Prior Level of Function: able to complete above activities prior to onset of symptoms    Pt goals include resolve pain to be able to return to PLOF.    Imagin24 Lumbar X-Ray: \"There is mild to moderate apex left low lumbar rotoscoliosis.  There is mild compensatory curvature apex right thoracolumbar.  Mild straightening of  the expected lordosis.  Grade 1 anterolisthesis L3 on L4 and L4 on L5.  Overall moderate disc degenerative changes.  Moderate to severe facet arthropathy at L3-L4, L4-L5 and L5-S1.  Vertebral body heights are within the limits of normal.\"    8/13/24 Hip X-Ray: \"FINDINGS:  Mild right and moderate left hip arthritis.  Smooth contour of the femoral heads.  No fracture, expansile lesion or erosion.  The pelvic ring appears intact.  [place] left low lumbar rotoscoliosis with moderate to severe degenerative changes.\"     Past medical history was reviewed with patient.   Past Medical History:    Abnormal pelvic exam    Bicuspid aortic valve (HCC)    Formatting of this note might be different from the original.   Formatting of this note might be different from the original.   TTE 2022 w/o change   F/u with cardiology for serial monitoring    Cervical lesion    Coronary atherosclerosis    Current mild episode of major depressive disorder without prior episode (HCC)    Formatting of this note might be different from the original.   Mood is improved.  Stable with therapy.   No si/hi.   PTSD, EMDR.   No pharmacologic rx  Formatting of this note might be different from the original.   Formatting of this note might be different from the original.   Mood is improved.  Stable with therapy.   No si/hi.   PTSD, EMDR.   No pharmacologic rx      Diabetes (HCC)    Fuchs' corneal dystrophy    Heart valve disease    High blood pressure    High cholesterol    Lyme disease    OSTEOARTHRITIS    Post-operative nausea and vomiting    pt states she often wakes up during surgery    PTSD (post-traumatic stress disorder)    9/11    Rotator cuff injury      Past Surgical History:   Procedure Laterality Date    Benign biopsy left Left 1993    Carpal tunnel release      Cath drug eluting stent      Eye surgery      Foot surgery      Hand/finger surgery unlisted Right 10/19/20--Dr. Paulo Shay    ring finger A1 pulley release/trigger finger release     Jose localization wire 1 site left (cpt=19281)      1990's bn    Other  1994    Other accessory      dissected LAD, double stented 8/11/23    Other surgical history      A RCR RIGHT SHOULDER     Other surgical history      TRIGGER RELEASE RIGHT THUMB    Other surgical history      CTR BILATERAL      Other surgical history      NEUROMA REMOVED FOOT     Repair rotator cuff,acute       ASSESSMENT  Katelyn presents to physical therapy evaluation with primary c/o right sided gluteal pain radiating into right posterior thigh with intermittent right foot tingling. Signs and symptoms are consistent with low back pain with mobility dysfunction with radiating pain. This is supported by painful/limited lumbar AROM (right lateral flexion), hypomobile/painful lumbar accessory testing, positive neurodynamic assessment with slump. Key contributing impairments include reduced control with SLS (Trendelenburg), reduce. Activity limitations include, but not limited to, walking, siting, walking on incline. Skilled Physical Therapy is medically necessary to address the above impairments and reach functional goals.     OBJECTIVE:   Observation/Posture: no visible swelling, bruising, discoloration to lumbar spine; no visible lumbar shift  Functional Motion/Test:   Gait: right gluteal pain/foot tinging during R stance phase  SLS (finger tips at wall for support): R Trendelenburg (reproduces R gluteal pain); L WNL    Neuro Screen:   LE Myotome (5-scale)   Hip Flexion  Knee Extension Ankle DF Great Toe Extensor Ankle Eversion Ankle PF Knee Flexion   Right 5 5 5 5 5 5 5   Left 5 5 5 5 5 5 5     Hip ROM Screen: Grossly WNL    Lumbar AROM:  Flexion: fingers to floor  Extension: min restriction (mild LBP)  Right Rotation: WNL  Left Rotation: WNL (R lower back pain)  Right Lateral Flexion: fingers to proximal shin (reproduces right gluteal pain)  Left Lateral Flexion: fingers to mid shin    Accessory motion:   Hip: R WNL; L WNL  Lumbar: hypomobile,  painful L4-S1 R UPA    Palpation: WNL lumbar paraspinal tone    Special tests:   Slump - Rigth:  Lumbar flexion: full  Cervical flexion: full  Knee extension: full  Ankle DF: full (reproduces right gluteal pain) Slump - Left:  Lumbar flexion: full  Cervical flexion: full  Knee extension: full  Ankle DF: full     Today’s Treatment and Response:   Pt education was provided on exam findings, treatment diagnosis, treatment plan, expectations, and prognosis. Pt was also provided recommendations for activity modifications, possible soreness after evaluation, modalities as needed [ice/heat], and importance of remaining active. Discussed expected course of PT interventions. Pain with lumbar right lateral flexion alleviated following brief lumbar accessory mobilization (gr II L4/5 R UPA). Patient was instructed in and issued a HEP for: seated trunk rotation R/L with UE assist for full mobility.     Charges: PT Eval Moderate Complexity, TherEx x1      Total Timed Treatment: 15 min     Total Treatment Time: 45 min     Based on clinical rationale and outcome measures, this evaluation involved Moderate Complexity decision making due to 1-2 personal factors/comorbidities, 3 body structures involved/activity limitations, and evolving symptoms including changing pain levels.  PLAN OF CARE:    Goals:    Pt will be able to complete lumbar right lateral flexion (fingers to mid shin) pain free (4 visits)  Pt will be able to walk >10 minutes without increased symptoms (8 visits)  Pt will be able to sit for >2 hours without increased pain (8 visits)  Pt will be able to walk up incline without increased pain (8 visits)    Frequency / Duration: Patient will be seen for 2 x/week or a total of 10 visits over a 90 day period. Treatment will include: TherEx, TherAc, NMR, Manual, Modalities prn.     Education or treatment limitation: None  Rehab Potential:good    Oswestry Disability Index Score  Score: 42 % (9/15/2024  9:09 AM)    Patient was  advised of these findings, precautions, and treatment options and has agreed to actively participate in planning and for this course of care.    Thank you for your referral. Please co-sign or sign and return this letter via fax as soon as possible to [phone]. If you have any questions, please contact me at Dept: [phone]    Sincerely,  Electronically signed by therapist: Paulo Drummond PT, DPT    Physician's certification required: Yes  I certify the need for these services furnished under this plan of treatment and while under my care.    X___________________________________________________ Date____________________    Certification From: 9/16/2024  To:12/15/2024

## 2024-09-18 ENCOUNTER — OFFICE VISIT (OUTPATIENT)
Dept: INTERNAL MEDICINE CLINIC | Facility: CLINIC | Age: 69
End: 2024-09-18
Payer: MEDICARE

## 2024-09-18 ENCOUNTER — OFFICE VISIT (OUTPATIENT)
Dept: PHYSICAL THERAPY | Age: 69
End: 2024-09-18
Attending: STUDENT IN AN ORGANIZED HEALTH CARE EDUCATION/TRAINING PROGRAM
Payer: MEDICARE

## 2024-09-18 VITALS — WEIGHT: 185 LBS | BODY MASS INDEX: 33 KG/M2

## 2024-09-18 DIAGNOSIS — E66.9 OBESITY (BMI 30.0-34.9): Primary | ICD-10-CM

## 2024-09-18 PROCEDURE — 97110 THERAPEUTIC EXERCISES: CPT

## 2024-09-18 PROCEDURE — 97140 MANUAL THERAPY 1/> REGIONS: CPT

## 2024-09-18 NOTE — PROGRESS NOTES
FOLLOW UP NUTRITION CONSULTATION    Nutrition Assessment    Diagnosis:Obesity, polymyalgia, dissected LAD, preidabetes, PTSD, HTN     Number of consultations with dietitian: 3    Height:  Ht Readings from Last 1 Encounters:   09/11/24 5' 2.5\" (1.588 m)       Weight:   Wt Readings from Last 2 Encounters:   09/18/24 185 lb (83.9 kg)   09/11/24 184 lb (83.5 kg)       BMI:  BMI Readings from Last 1 Encounters:   09/18/24 33.30 kg/m²       Weight change: No change in the past 2  months        Current Diet/Changes in Eating Habits: Reduced cravings for sweets and bread      Diet/Lifestyle Modifications Following Previous Nutrition Consultation      Food journal: no    Oral recall:  Breakfast 2 eggs cooked in olive oil, high protein waffle with peanut butter  Lunch  Skipped  Dinner 1 chicken tinga taco, split dessert with partner (restaurant)  Snacks [employer] or [employer]  Beverages: water    Physical activity: limited due to shortness of breath and back pain    Spent 15 minutes in consultation with the patient.    Assessment:   Pt with no change in weight related to lack of exercise and similar diet since previous appt    Advised:  Pt was advised that despite lack of PA weight loss was possible by reducing portions.    Assisted   Pt with portion control strategies including increased awareness of satiety.  A hunger/fullness scale was provided.      Arranged:   Follow up appt was scheduled 11/20    Westley Blanchard MS, RD, LDN

## 2024-09-18 NOTE — PROGRESS NOTES
Referring Diagnosis:  Scoliosis of lumbar spine, unspecified scoliosis type (M41.9); Chronic right-sided low back pain with right-sided sciatica (M54.41,G89.29);Anterolisthesis of lumbar spine (M43.16)   Referring Provider: Brianda Date of Evaluation: 9/16/2024   Precautions: ”None” Next MD visit: none scheduled   Date of Surgery: n/a    Insurance Primary/Secondary: MEDICARE / [employer] [place] INDEMNITY     # Auth Visits: 10 POC     Subjective: Had temporary relief after last session. Did the trunk rotation stretch in standing after a walk yesterday, had increased pain.  Current Pain: 1/10    Objective:   Lumbar AROM:  Extension: min restriction 3/10 LBP (1/10 LBP following mobilization)    Assessment: Reduced LBP following lumbar mobilization. Pain returned with repeated right lumbar lateral flexion. Incorporated bridge to facilitate partial range lumbar extension with gluteal activation.    Goals:   Pt will be able to complete lumbar right lateral flexion (fingers to mid shin) pain free (4 visits)  Pt will be able to walk >10 minutes without increased symptoms (8 visits)  Pt will be able to sit for >2 hours without increased pain (8 visits)  Pt will be able to walk up incline without increased pain (8 visits)    Plan: Continue PT.     Date:   9/18/2024  TX#: 2 Date:  TX#: 3 Date:  TX#: 4 Date:  TX#: 5 Date:  TX#: 6 Date:  TX#: 7 Date:  TX#: 8   TherEx:  -Lumbar extension AROM x10 reps  -Lumbar right lateral flexion AROM x10 reps  -Bridge 2x10 reps         Manual:  -L5/S1 R UPA gr II x10 min           HEP: Lumbar rotation in sitting    Charges: TherEx x2 (30 min), Manual x1 (10 min)       Total Timed Treatment: 40 min  Total Treatment Time: 40 min

## 2024-09-20 ENCOUNTER — OFFICE VISIT (OUTPATIENT)
Dept: INTEGRATIVE MEDICINE | Facility: CLINIC | Age: 69
End: 2024-09-20
Payer: MEDICARE

## 2024-09-20 VITALS
WEIGHT: 183.81 LBS | SYSTOLIC BLOOD PRESSURE: 132 MMHG | HEART RATE: 94 BPM | HEIGHT: 62.5 IN | DIASTOLIC BLOOD PRESSURE: 68 MMHG | BODY MASS INDEX: 32.98 KG/M2 | OXYGEN SATURATION: 97 %

## 2024-09-20 DIAGNOSIS — R65.10 SYSTEMIC INFLAMMATORY RESPONSE SYNDROME (HCC): Primary | ICD-10-CM

## 2024-09-20 DIAGNOSIS — F43.10 PTSD (POST-TRAUMATIC STRESS DISORDER): ICD-10-CM

## 2024-09-20 DIAGNOSIS — R53.82 CHRONIC FATIGUE: ICD-10-CM

## 2024-09-20 DIAGNOSIS — R63.5 WEIGHT GAIN: ICD-10-CM

## 2024-09-20 DIAGNOSIS — M25.50 POLYARTHRALGIA: ICD-10-CM

## 2024-09-20 PROCEDURE — G2211 COMPLEX E/M VISIT ADD ON: HCPCS | Performed by: PHYSICIAN ASSISTANT

## 2024-09-20 PROCEDURE — 99215 OFFICE O/P EST HI 40 MIN: CPT | Performed by: PHYSICIAN ASSISTANT

## 2024-09-20 NOTE — PATIENT INSTRUCTIONS
Restart EMDR therapy   Full script email sent   Supplement recommendations:  OrthoMune (120 capsules) (Ortho Molecular Products): Please take  2 capsules x twice per day / anytime  ongoing.   Copper (Glycinate) (60 capsules) (Pure Encapsulations): Please take  1 capsule x once per day / anytime  ongoing.   GI Repair Powder (206 Grams) (Vital Nutrients): Please take  1 teaspoon x twice per day / anytime  ongoing.   HistDAO (60 tablets) (XYMOGEN): Please take  1 tablet x once per day / anytime  ongoing (with high histamine foods )  Mitocore (120 capsules) (Ortho [employer] Products): Please take  2 capsules x once per day / anytime  ongoing (Start with 4 for a few weeks and decrease to 2 )    Histamine food list   In general, foods to AVOID:   Cultured or fermented foods - sauerkraut, kombucha, pickles, miso, kimchee  Yeast  Vinegar and foods containing vinegar - salad dressing, mustard, ketchup, mayonnaise  Chocolate and Cocoa  Soda and energy drinks  Canned foods  Preservatives and additives  Leftovers: very ripe, older or non-hygenic foods  Coffee, black and green tea  Spices/seasoning - anise, cinnamon, clove, nutmeg, chili powder, malik, hot peppers       Food group Low histamine Minimal High histamine    Protein  Poultry - chicken, duck, pheasant, turkey  -Organic, grass fed and skinless preferred  Frozen     Meat: beef, buffalo, elk, lamb, pork, venison  -Organic, grass fed preferred   frozen -Slow-cooked or leftover meat  -Processed meats: ochoa, sausage, deli meat, etc.  -Smoked or cured: ham, salami, pastrami -Factory-farmed, or with added sugar, MSG, sulfites, or carrageenan    Seafood: sustainable fished and wild caught   -gutted within 30 minutes  Flash-frozen depending upon how quickly fish was gutted -Shellfish  -aged fish (canned, smoked)  -fish not immediately gutted after catching   Eggs Eggs organic & free-range; fully cooked  Egg whites, raw or undercooked   Dairy  Milk  Yogurt    Rice Milk  Kefir-  depending on culture used     Cream      Cream Cheese (except when cultured  Aged cheese     Cottage cheese     Fruit - Fresh  Apples (all varieties) Apricots   Blackberries, Blueberries   Cherries  Dates   Figs   Exotic fruits (star fruit, quince)   Grapes (both red & green)   Melon   Nectarines   Peaches   Pears (all varieties) Plums   Pomegranates Raspberries Watermelon   Non-citrus juices Dried Fruit Generally, any overripe fruit   Avocado  Bananas   Citrus fruits   Grapefruit  Kiwi  Lemon/Lime  Giovani   Oranges  Papaya  Pineapple Strawberries  Tangerines     Vegetables  Anise/fennel root Artichoke   Arugula  Asparagus   Beets  Garza peppers   Bok Salty   Broccoli   [place] sprouts Cabbage   Carrots  Cauliflower  Celery   Cucumber   Eggplant   Garlic   Meenakshi   Green beans  Greens (beet, dale, mustard, turnip) Herbs (leafy: basil, mint, parsley, oregano, rosemary, tarragon, thyme) Jicama   Kale   Kohlrabi   Leeks  Lettuce (adal, butter, red)   Mushrooms (all)   Okra   Onion (red)/Shallot Parsnips   Peas (snow, sugar snap)   Pumpkin   Radish   [place]   Rhubarb   Sprouts   Squash (acorn, butternut, delicata, spaghetti, summer) Sweet Potato/Yam Swiss chard Turnip Watercress   Zucchini  Spinach   Tomato (fresh or processed)   Grains  [place]  Oats  Rice  Spelt  Pasta made from corn, rice, spelt  Wheat-based foods   Breads  Yeast-free: muffins & tortillas made from acceptable grains Euro-style rye bread     Sweeteners Agave Honey Maple syrup Non-GMO sugar     Beans Garbanzo  Black  Lentils   Red beans    Fats  - must be 100% grass-fed & organic Cooking Fats:  - Animal fats   -Clarified butter   -Ghee  -Coconut oil  -Extra-virgin Olive oil Eating Fats:   - Coconut   -butter  -Coconut flakes -Olives (all) Nuts & Seeds: Almonds   [place] butter  Brazil nuts   Pecans   Pistachios   -Flax   -Pine nuts   -Pumpkin seeds/ pepitas   -Sesame seeds   -Sunflower seeds   -Sunflower seed butter   -Walnuts Cashews  Coconut milk  (canned)  Hazelnuts (Filberts) Macadamia nuts  Avocado   Drinks Herbal Tea   Non citrus fruit juice  water   Green tea   Clear spirits   White wine  Coffee  Black Tea  Soda  Wine   Beer
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
## 2024-09-24 ENCOUNTER — HOSPITAL ENCOUNTER (OUTPATIENT)
Dept: CV DIAGNOSTICS | Facility: HOSPITAL | Age: 69
Discharge: HOME | End: 2024-09-24
Attending: INTERNAL MEDICINE
Payer: MEDICARE

## 2024-09-24 ENCOUNTER — TELEPHONE (OUTPATIENT)
Dept: PHYSICAL THERAPY | Facility: HOSPITAL | Age: 69
End: 2024-09-24

## 2024-09-24 DIAGNOSIS — R06.09 DOE (DYSPNEA ON EXERTION): ICD-10-CM

## 2024-09-24 PROCEDURE — 94621 CARDIOPULM EXERCISE TESTING: CPT | Performed by: INTERNAL MEDICINE

## 2024-09-25 ENCOUNTER — APPOINTMENT (OUTPATIENT)
Dept: PHYSICAL THERAPY | Age: 69
End: 2024-09-25
Attending: STUDENT IN AN ORGANIZED HEALTH CARE EDUCATION/TRAINING PROGRAM
Payer: MEDICARE

## 2024-09-27 ENCOUNTER — APPOINTMENT (OUTPATIENT)
Dept: PHYSICAL THERAPY | Age: 69
End: 2024-09-27
Attending: STUDENT IN AN ORGANIZED HEALTH CARE EDUCATION/TRAINING PROGRAM
Payer: MEDICARE

## 2024-09-30 ENCOUNTER — APPOINTMENT (OUTPATIENT)
Dept: MAMMOGRAPHY | Age: 69
End: 2024-09-30
Attending: STUDENT IN AN ORGANIZED HEALTH CARE EDUCATION/TRAINING PROGRAM
Payer: MEDICARE

## 2024-09-30 ENCOUNTER — HOSPITAL ENCOUNTER (OUTPATIENT)
Dept: BONE DENSITY | Age: 69
Discharge: HOME | End: 2024-09-30
Attending: STUDENT IN AN ORGANIZED HEALTH CARE EDUCATION/TRAINING PROGRAM
Payer: MEDICARE

## 2024-09-30 DIAGNOSIS — Z78.0 POST-MENOPAUSAL: ICD-10-CM

## 2024-09-30 PROCEDURE — 77080 DXA BONE DENSITY AXIAL: CPT | Performed by: STUDENT IN AN ORGANIZED HEALTH CARE EDUCATION/TRAINING PROGRAM

## 2024-10-02 ENCOUNTER — TELEPHONE (OUTPATIENT)
Facility: CLINIC | Age: 69
End: 2024-10-02

## 2024-10-02 ENCOUNTER — OFFICE VISIT (OUTPATIENT)
Dept: PHYSICAL THERAPY | Age: 69
End: 2024-10-02
Attending: STUDENT IN AN ORGANIZED HEALTH CARE EDUCATION/TRAINING PROGRAM
Payer: MEDICARE

## 2024-10-02 PROCEDURE — 97110 THERAPEUTIC EXERCISES: CPT

## 2024-10-02 PROCEDURE — 97140 MANUAL THERAPY 1/> REGIONS: CPT

## 2024-10-02 NOTE — PROGRESS NOTES
Referring Diagnosis:  Scoliosis of lumbar spine, unspecified scoliosis type (M41.9); Chronic right-sided low back pain with right-sided sciatica (M54.41,G89.29);Anterolisthesis of lumbar spine (M43.16)   Referring Provider: Brianda Date of Evaluation: 9/16/2024   Precautions: ”None” Next MD visit: none scheduled   Date of Surgery: n/a    Insurance Primary/Secondary: MEDICARE / [place]BS [place] INDEMNITY     # Auth Visits: 10 POC     Subjective: The lumbar rotation stretch seems to be helpful. Has days in which the symptoms are not triggered as easily, but still has days in which the pain is easily triggered. Recently started integrated medicine with [facility], started various supplements, feels better since starting the supplements. Waiting for test results from pulmonologist.   Current Pain: 1/10    Objective:   Lumbar AROM:  Extension: min restriction 2/10 LBP (0/10 LBP following mobilization)    Assessment: Reduce LBP following lumbar accessory motion. Incorporated standing marching at wall to facilitate gluteals and trunk extensors.     Goals:   Pt will be able to complete lumbar right lateral flexion (fingers to mid shin) pain free (4 visits)  Pt will be able to walk >10 minutes without increased symptoms (8 visits)  Pt will be able to sit for >2 hours without increased pain (8 visits)  Pt will be able to walk up incline without increased pain (8 visits)    Plan: Continue PT.     Date:   9/18/2024  TX#: 2 Date:  10/2/2024  TX#: 3 Date:  TX#: 4 Date:  TX#: 5 Date:  TX#: 6 Date:  TX#: 7 Date:  TX#: 8   TherEx:  -Lumbar extension AROM x10 reps  -Lumbar right lateral flexion AROM x10 reps  -Bridge 2x10 reps TherEx:  -Lumbar extension AROM x10 reps  -Lumbar right lateral flexion AROM x10 reps  -Standing wall marching, R/L x5 reps        Manual:  -L5/S1 R UPA gr II x10 min Manual:  -L5/S1 R UPA gr II x10 min          HEP: Lumbar rotation in sitting    Charges: TherEx x2 (30 min), Manual x1 (10 min)       Total Timed Treatment: 40  min  Total Treatment Time: 40 min

## 2024-10-03 NOTE — TELEPHONE ENCOUNTER
Called patient to let her know we see the results but know interpretation is back yet.  Will inform Dr. Chew that testing is done.

## 2024-10-07 ENCOUNTER — OFFICE VISIT (OUTPATIENT)
Dept: PHYSICAL THERAPY | Age: 69
End: 2024-10-07
Attending: STUDENT IN AN ORGANIZED HEALTH CARE EDUCATION/TRAINING PROGRAM
Payer: MEDICARE

## 2024-10-07 PROCEDURE — 97140 MANUAL THERAPY 1/> REGIONS: CPT

## 2024-10-07 PROCEDURE — 97110 THERAPEUTIC EXERCISES: CPT

## 2024-10-07 NOTE — PROGRESS NOTES
Referring Diagnosis:  Scoliosis of lumbar spine, unspecified scoliosis type (M41.9); Chronic right-sided low back pain with right-sided sciatica (M54.41,G89.29);Anterolisthesis of lumbar spine (M43.16)   Referring Provider: Brianda Date of Evaluation: 9/16/2024   Precautions: ”None” Next MD visit: none scheduled   Date of Surgery: n/a    Insurance Primary/Secondary: MEDICARE / [place] INDEMNITY     # Auth Visits: 10 POC     Subjective: Was doing a lot of lifting last night, woke up with increased right posterolateral hip pain. Did not perform standing exercise, was doing  a lot of walking and busy with activities, so unable to perform exercise.   Current Pain: 1/10    Objective:   Lumbar AROM:  Extension: min restriction 1/10 LBP into right hip (0/10 LBP/right hip pain following mobilization)    Assessment: Reduced LBP/R hip pain following lumbar accessory mobilization. Exercise to facilitate gluteals performed to assist with symptoms with weightbearing, reports reduced intensity of pain with standing exercise (but temporary relief).     Goals:   Pt will be able to complete lumbar right lateral flexion (fingers to mid shin) pain free (4 visits)  Pt will be able to walk >10 minutes without increased symptoms (8 visits)  Pt will be able to sit for >2 hours without increased pain (8 visits)  Pt will be able to walk up incline without increased pain (8 visits)    Plan: Continue PT. Incorporate quadruped is tolerated well.    Date:   9/18/2024  TX#: 2 Date:  10/2/2024  TX#: 3 Date:  10/7/2024  TX#: 4 Date:  TX#: 5 Date:  TX#: 6 Date:  TX#: 7 Date:  TX#: 8   TherEx:  -Lumbar extension AROM x10 reps  -Lumbar right lateral flexion AROM x10 reps  -Bridge 2x10 reps TherEx:  -Lumbar extension AROM x10 reps  -Lumbar right lateral flexion AROM x10 reps  -Standing wall marching, R/L x5 reps TherEx:  -Lumbar extension AROM x10 reps  -Lumbar right lateral flexion AROM x10 reps  -Alternating unilateral hip ER, hooklying, red band 3  sec hold x15 reps  -Bilateral hip ER with posterior pelvic tilt 3 sec hold x15 reps  -Standing wall marching, R/L x5 reps       Manual:  -L5/S1 R UPA gr II x10 min Manual:  -L5/S1 R UPA gr II x10 min Manual:  -L5/S1 R UPA gr II x10 min         HEP: Lumbar rotation in sitting, supine clams with pelvic tilting    Charges: TherEx x2 (30 min), Manual x1 (10 min)       Total Timed Treatment: 40 min  Total Treatment Time: 40 min

## 2024-10-09 ENCOUNTER — OFFICE VISIT (OUTPATIENT)
Dept: PHYSICAL THERAPY | Age: 69
End: 2024-10-09
Attending: STUDENT IN AN ORGANIZED HEALTH CARE EDUCATION/TRAINING PROGRAM
Payer: MEDICARE

## 2024-10-09 PROCEDURE — 97110 THERAPEUTIC EXERCISES: CPT

## 2024-10-09 PROCEDURE — 97140 MANUAL THERAPY 1/> REGIONS: CPT

## 2024-10-09 NOTE — PROGRESS NOTES
Referring Diagnosis:  Scoliosis of lumbar spine, unspecified scoliosis type (M41.9); Chronic right-sided low back pain with right-sided sciatica (M54.41,G89.29);Anterolisthesis of lumbar spine (M43.16)   Referring Provider: Brianda Date of Evaluation: 9/16/2024   Precautions: ”None” Next MD visit: none scheduled   Date of Surgery: n/a    Insurance Primary/Secondary: MEDICARE / [place]DEMN[place]     # Auth Visits: 10 POC     Subjective: Doing better compared to Monday, limited activity over Monday and Tuesday.   Current Pain: 1/10    Objective:   Lumbar AROM:  Extension: min restriction 1/10 LBP into right hip (0/10 LBP/right hip pain following mobilization)    Assessment: Continued progress with reducing pain with lumbar AROM. Progressed WB activities to facilitate gluteals with partial lunge. Reports increased gluteal fatigue without increased symptoms during exercises.      Goals:   Pt will be able to complete lumbar right lateral flexion (fingers to mid shin) pain free (4 visits)  Pt will be able to walk >10 minutes without increased symptoms (8 visits)  Pt will be able to sit for >2 hours without increased pain (8 visits)  Pt will be able to walk up incline without increased pain (8 visits)    Plan: Continue PT. Incorporate quadruped is tolerated well.    Date:   9/18/2024  TX#: 2 Date:  10/2/2024  TX#: 3 Date:  10/7/2024  TX#: 4 Date:  10/9/2024  TX#: 5 Date:  TX#: 6 Date:  TX#: 7 Date:  TX#: 8   TherEx:  -Lumbar extension AROM x10 reps  -Lumbar right lateral flexion AROM x10 reps  -Bridge 2x10 reps TherEx:  -Lumbar extension AROM x10 reps  -Lumbar right lateral flexion AROM x10 reps  -Standing wall marching, R/L x5 reps TherEx:  -Lumbar extension AROM x10 reps  -Lumbar right lateral flexion AROM x10 reps  -Alternating unilateral hip ER, hooklying, red band 3 sec hold x15 reps  -Bilateral hip ER with posterior pelvic tilt 3 sec hold x15 reps  -Standing wall marching, R/L x5 reps TherEx:  -Alternating  unilateral hip ER, hooklying, red band 3 sec hold x15 reps  -Bilateral hip ER with posterior pelvic tilt 3 sec hold x15 reps  -Standing wall marching, R/L x5 reps  -Partial lunge, R/L x15 reps      Manual:  -L5/S1 R UPA gr II x10 min Manual:  -L5/S1 R UPA gr II x10 min Manual:  -L5/S1 R UPA gr II x10 min Manual:  -L5/S1 R UPA gr II x10 min        HEP: Lumbar rotation in sitting, supine clams with pelvic tilting    Charges: TherEx x2 (30 min), Manual x1 (10 min)       Total Timed Treatment: 40 min  Total Treatment Time: 40 min

## 2024-10-10 ENCOUNTER — TELEPHONE (OUTPATIENT)
Facility: CLINIC | Age: 69
End: 2024-10-10

## 2024-10-10 NOTE — TELEPHONE ENCOUNTER
Returned patient's call. Informed we routed her first message to Dr. Chew and awaiting his review.  Will inform Dr. Chew to review Visit notes with Emily Aguilar on 9/20/24 and call patient regarding results.

## 2024-10-10 NOTE — TELEPHONE ENCOUNTER
Called/reviewed results in detail with patient  CPET showing dyspnea most likley related to cardiac and fitness etiology    She was advised to f/u with her cardiologist    Jesus Chew MD

## 2024-10-11 ENCOUNTER — IMMUNIZATION (OUTPATIENT)
Dept: LAB | Age: 69
End: 2024-10-11
Attending: EMERGENCY MEDICINE
Payer: MEDICARE

## 2024-10-11 DIAGNOSIS — Z23 NEED FOR VACCINATION: Primary | ICD-10-CM

## 2024-10-11 PROCEDURE — 90662 IIV NO PRSV INCREASED AG IM: CPT

## 2024-10-11 PROCEDURE — 90480 ADMN SARSCOV2 VAC 1/ONLY CMP: CPT

## 2024-10-11 PROCEDURE — 90471 IMMUNIZATION ADMIN: CPT

## 2024-10-14 ENCOUNTER — OFFICE VISIT (OUTPATIENT)
Dept: PHYSICAL THERAPY | Age: 69
End: 2024-10-14
Attending: STUDENT IN AN ORGANIZED HEALTH CARE EDUCATION/TRAINING PROGRAM
Payer: MEDICARE

## 2024-10-14 PROCEDURE — 97140 MANUAL THERAPY 1/> REGIONS: CPT

## 2024-10-14 PROCEDURE — 97110 THERAPEUTIC EXERCISES: CPT

## 2024-10-14 NOTE — PROGRESS NOTES
Referring Diagnosis:  Scoliosis of lumbar spine, unspecified scoliosis type (M41.9); Chronic right-sided low back pain with right-sided sciatica (M54.41,G89.29);Anterolisthesis of lumbar spine (M43.16)   Referring Provider: Brianda Date of Evaluation: 9/16/2024   Precautions: ”None” Next MD visit: none scheduled   Date of Surgery: n/a    Insurance Primary/Secondary: MEDICARE / [employer] [place] INDEMNITY     # Auth Visits: 10 POC     Subjective: Did okay after last session. Had vaccination a couple of days ago, was more sore and felt she had more frequency of shortness of breath episodes, especially with stairs.  Current Pain: 1/10    Objective:   Lumbar AROM:  Extension: min restriction 0/10 LBP into right hip (0/10 LBP/right hip pain following mobilization)    Assessment: No symptoms with lumbar AROM this session. Good progress with exercises.     Goals:   Pt will be able to complete lumbar right lateral flexion (fingers to mid shin) pain free (4 visits)  Pt will be able to walk >10 minutes without increased symptoms (8 visits)  Pt will be able to sit for >2 hours without increased pain (8 visits)  Pt will be able to walk up incline without increased pain (8 visits)    Plan: Continue PT. Incorporate quadruped is tolerated well.    Date:   9/18/2024  TX#: 2 Date:  10/2/2024  TX#: 3 Date:  10/7/2024  TX#: 4 Date:  10/9/2024  TX#: 5 Date:  10/14/2024  TX#: 6 Date:  TX#: 7 Date:  TX#: 8   TherEx:  -Lumbar extension AROM x10 reps  -Lumbar right lateral flexion AROM x10 reps  -Bridge 2x10 reps TherEx:  -Lumbar extension AROM x10 reps  -Lumbar right lateral flexion AROM x10 reps  -Standing wall marching, R/L x5 reps TherEx:  -Lumbar extension AROM x10 reps  -Lumbar right lateral flexion AROM x10 reps  -Alternating unilateral hip ER, hooklying, red band 3 sec hold x15 reps  -Bilateral hip ER with posterior pelvic tilt 3 sec hold x15 reps  -Standing wall marching, R/L x5 reps TherEx:  -Alternating unilateral hip ER, hooklying, red  band 3 sec hold x15 reps  -Bilateral hip ER with posterior pelvic tilt 3 sec hold x15 reps  -Standing wall marching, R/L x5 reps  -Partial lunge, R/L x15 reps TherEx:  -Alternating unilateral hip ER, hooklying, red band 3 sec hold x15 reps  -Bilateral hip ER with posterior pelvic tilt 3 sec hold x15 reps  -Standing wall marching, R/L x5 reps  -Partial lunge, R/L x15 reps     Manual:  -L5/S1 R UPA gr II x10 min Manual:  -L5/S1 R UPA gr II x10 min Manual:  -L5/S1 R UPA gr II x10 min Manual:  -L5/S1 R UPA gr II x10 min Manual:  -L5/S1 R UPA gr III x10 min       HEP: Lumbar rotation in sitting, supine clams with pelvic tilting    Charges: TherEx x2 (30 min), Manual x1 (10 min)       Total Timed Treatment: 40 min  Total Treatment Time: 40 min

## 2024-10-16 ENCOUNTER — OFFICE VISIT (OUTPATIENT)
Dept: PHYSICAL THERAPY | Age: 69
End: 2024-10-16
Attending: STUDENT IN AN ORGANIZED HEALTH CARE EDUCATION/TRAINING PROGRAM
Payer: MEDICARE

## 2024-10-16 PROCEDURE — 97110 THERAPEUTIC EXERCISES: CPT

## 2024-10-16 PROCEDURE — 97140 MANUAL THERAPY 1/> REGIONS: CPT

## 2024-10-16 NOTE — PROGRESS NOTES
Referring Diagnosis:  Scoliosis of lumbar spine, unspecified scoliosis type (M41.9); Chronic right-sided low back pain with right-sided sciatica (M54.41,G89.29);Anterolisthesis of lumbar spine (M43.16)   Referring Provider: Brianda Date of Evaluation: 9/16/2024   Precautions: ”None” Next MD visit: none scheduled   Date of Surgery: n/a    Insurance Primary/Secondary: MEDIC[place] / [place]DEMN[place]     # Auth Visits: 10 POC     Subjective: Feeling better since having the vaccine. She used her smart watch to track oxygen level at night, states oxygen level lowered to (will report to physician). Noticed pain when getting out of bed this AM, better as morning has progressed.   Current Pain: 0.5/10    Objective:   Lumbar AROM:  Extension: min restriction 0/10 LBP into right hip (0/10 LBP/right hip pain following mobilization)  Right Rotation: WNL  Left Rotation: WNL   Right Lateral Flexion: fingers to mid shin  Left Lateral Flexion: fingers to mid shin    Assessment: Incorporated light resistance exercises for LE. Good form with exercises. Able to perform quadruped exercise with reports of \"good stretch\" in lumbar spine.     Goals:   Pt will be able to complete lumbar right lateral flexion (fingers to mid shin) pain free (4 visits) Met  Pt will be able to walk >10 minutes without increased symptoms (8 visits)  Pt will be able to sit for >2 hours without increased pain (8 visits)  Pt will be able to walk up incline without increased pain (8 visits)    Plan: Continue PT.     Date:   9/18/2024  TX#: 2 Date:  10/2/2024  TX#: 3 Date:  10/7/2024  TX#: 4 Date:  10/9/2024  TX#: 5 Date:  10/14/2024  TX#: 6 Date:  10/16/2024  TX#: 7 Date:  TX#: 8   TherEx:  -Lumbar extension AROM x10 reps  -Lumbar right lateral flexion AROM x10 reps  -Bridge 2x10 reps TherEx:  -Lumbar extension AROM x10 reps  -Lumbar right lateral flexion AROM x10 reps  -Standing wall marching, R/L x5 reps TherEx:  -Lumbar extension AROM x10 reps  -Lumbar right  lateral flexion AROM x10 reps  -Alternating unilateral hip ER, hooklying, red band 3 sec hold x15 reps  -Bilateral hip ER with posterior pelvic tilt 3 sec hold x15 reps  -Standing wall marching, R/L x5 reps TherEx:  -Alternating unilateral hip ER, hooklying, red band 3 sec hold x15 reps  -Bilateral hip ER with posterior pelvic tilt 3 sec hold x15 reps  -Standing wall marching, R/L x5 reps  -Partial lunge, R/L x15 reps TherEx:  -Alternating unilateral hip ER, hooklying, red band 3 sec hold x15 reps  -Bilateral hip ER with posterior pelvic tilt 3 sec hold x15 reps  -Standing wall marching, R/L x5 reps  -Partial lunge, R/L x15 reps TherEx:  -LAQ 2# ankle weight R/L 2x10 reps  -Bilateral hip ER with posterior pelvic tilt 3 sec hold x15 reps  -Standing wall marching, R/L x5 reps  -Partial lunge, R/L x15 reps    Manual:  -L5/S1 R UPA gr II x10 min Manual:  -L5/S1 R UPA gr II x10 min Manual:  -L5/S1 R UPA gr II x10 min Manual:  -L5/S1 R UPA gr II x10 min Manual:  -L5/S1 R UPA gr III x10 min Manual:  -L5/S1 R UPA gr III x10 min      HEP: Lumbar rotation in sitting, supine clams with pelvic tilting    Charges: TherEx x2 (30 min), Manual x1 (10 min)       Total Timed Treatment: 40 min  Total Treatment Time: 40 min

## 2024-10-23 ENCOUNTER — OFFICE VISIT (OUTPATIENT)
Dept: INTERNAL MEDICINE CLINIC | Facility: CLINIC | Age: 69
End: 2024-10-23
Payer: MEDICARE

## 2024-10-23 VITALS
DIASTOLIC BLOOD PRESSURE: 78 MMHG | HEIGHT: 62 IN | RESPIRATION RATE: 18 BRPM | BODY MASS INDEX: 33.13 KG/M2 | WEIGHT: 180 LBS | HEART RATE: 85 BPM | SYSTOLIC BLOOD PRESSURE: 126 MMHG

## 2024-10-23 DIAGNOSIS — F43.10 PTSD (POST-TRAUMATIC STRESS DISORDER): ICD-10-CM

## 2024-10-23 DIAGNOSIS — Z91.89 AT RISK FOR CARDIOVASCULAR EVENT: ICD-10-CM

## 2024-10-23 DIAGNOSIS — E66.811 OBESITY (BMI 30.0-34.9): Primary | ICD-10-CM

## 2024-10-23 DIAGNOSIS — I25.10 CORONARY ARTERY DISEASE INVOLVING NATIVE CORONARY ARTERY OF NATIVE HEART WITHOUT ANGINA PECTORIS: ICD-10-CM

## 2024-10-23 DIAGNOSIS — Z51.81 ENCOUNTER FOR THERAPEUTIC DRUG MONITORING: ICD-10-CM

## 2024-10-23 PROBLEM — I10 HYPERTENSION: Status: ACTIVE | Noted: 2024-08-12

## 2024-10-23 PROBLEM — I48.0 PAROXYSMAL ATRIAL FIBRILLATION (HCC): Status: ACTIVE | Noted: 2023-07-05

## 2024-10-23 PROBLEM — Q23.81 BICUSPID AORTIC VALVE: Status: ACTIVE | Noted: 2023-07-05

## 2024-10-23 PROCEDURE — 99214 OFFICE O/P EST MOD 30 MIN: CPT | Performed by: INTERNAL MEDICINE

## 2024-10-23 RX ORDER — METFORMIN HYDROCHLORIDE 750 MG/1
750 TABLET, EXTENDED RELEASE ORAL
Qty: 180 TABLET | Refills: 0 | Status: SHIPPED | OUTPATIENT
Start: 2024-10-23

## 2024-10-23 NOTE — PROGRESS NOTES
HISTORY OF PRESENT ILLNESS  Chief Complaint   Patient presents with    Weight Check     Down 7 pounds        Katelyn Gutierrez is a 69 year old female here for follow up in medical weight loss program.     Denies chest pain, shortness of breath, dizziness, blurred vision, headache, paresthesia, nausea/vomiting.   Reviewed recommendations with  and started on vitamins   Reviewed recommendations and vitamin supplementation.   Still struggling with inflammation.   Recommended to start a low histamine diet  Not sustainable diet for her in the term    Recommended to also start whole foods diet    Noting the dose titration and symptoms have increased.   Plans to switch to new hematology   Dong physical therapy with Paulo Drummond and inflammation whole body   Feeling good appetite suppresion with metformin       Wt Readings from Last 6 Encounters:   10/29/24 180 lb (81.6 kg)   10/23/24 180 lb (81.6 kg)   09/20/24 183 lb 12.8 oz (83.4 kg)   09/18/24 185 lb (83.9 kg)   09/11/24 184 lb (83.5 kg)   09/05/24 187 lb (84.8 kg)            Breakfast Lunch Dinner Snacks Fluids   Reviewed            REVIEW OF SYSTEMS  GENERAL HEALTH: feels well otherwise, denied any fevers chills or night sweats   RESPIRATORY: denies shortness of breath   CARDIOVASCULAR: denies chest pain  GI: denies abdominal pain    EXAM  /78   Pulse 85   Resp 18   Ht 5' 2\" (1.575 m)   Wt 180 lb (81.6 kg)   LMP  (LMP Unknown)   BMI 32.92 kg/m²   GENERAL: well developed, well nourished,in no apparent distress, A/O x3  SKIN: no rashes,no suspicious lesions  HEENT: atraumatic, normocephalic, OP-clear, PERRL  NECK: supple,no adenopathy  LUNGS: clear to auscultation bilaterally   CARDIO: RRR without murmur  GI: good BS's,NT/ND, no masses or HSM  EXTREMITIES: no cyanosis, no clubbing, no edema    Lab Results   Component Value Date    WBC 6.7 07/29/2024    RBC 3.91 07/29/2024    HGB 12.3 07/29/2024    HCT 36.3 07/29/2024    MCV 92.8 07/29/2024    MCH  31.5 07/29/2024    MCHC 33.9 07/29/2024    RDW 13.0 07/29/2024    .0 07/29/2024     Lab Results   Component Value Date     (H) 07/29/2024    BUN 15 07/29/2024    BUNCREA 14.3 06/09/2024    CREATSERUM 0.94 07/29/2024    ANIONGAP 6 07/29/2024    GFRNAA 62 02/19/2018    GFRAA 71 02/19/2018    CA 9.8 07/29/2024    OSMOCALC 288 07/29/2024    ALKPHO 71 05/08/2024    AST 32 05/08/2024    ALT 29 05/08/2024    BILT 0.6 05/08/2024    TP 6.8 05/08/2024    ALB 3.5 05/08/2024     07/29/2024    K 4.1 07/29/2024     07/29/2024    CO2 26.0 07/29/2024     Lab Results   Component Value Date     09/05/2024    A1C 5.9 (H) 09/05/2024     Lab Results   Component Value Date    CHOLEST 212 (H) 05/31/2024    TRIG 130 05/31/2024    HDL 81 (H) 05/31/2024     (H) 05/31/2024    VLDL 22 05/31/2024    NONHDLC 131 (H) 05/31/2024     Lab Results   Component Value Date    T4F 0.8 05/08/2024    TSH 1.580 05/08/2024     No results found for: \"B12\", \"VITB12\"  Lab Results   Component Value Date    VITD 43.39 01/21/2022       Current Outpatient Medications on File Prior to Visit   Medication Sig Dispense Refill    predniSONE 1 MG Oral Tab Take 4 tablets (4 mg total) by mouth daily.      albuterol 108 (90 Base) MCG/ACT Inhalation Aero Soln Inhale 2 puffs into the lungs every 4 to 6 hours as needed for Wheezing. 18 g 0    L-methylfolate 15 MG Oral Tab Take 1 tablet (15 mg total) by mouth daily. 90 tablet 3    Multiple Vitamin (MULTI-VITAMIN DAILY) Oral Tab Take by mouth daily.      Evolocumab (REPATHA SURECLICK) 140 MG/ML Subcutaneous Solution Auto-injector Inject into the skin Every 2 (two) weeks.      losartan 25 MG Oral Tab Take 1 tablet (25 mg total) by mouth daily.      aspirin 81 MG Oral Tab EC aspirin 81 mg tablet,delayed release, [RxNorm: 496612]      Coenzyme Q10 (CO Q-10 OR) Take by mouth 2 (two) times a day.      Omega-3 Fatty Acids (FISH OIL BURP-LESS OR) Take by mouth 2 (two) times a day.      TURMERIC  OR Take by mouth daily.      prasugrel 10 MG Oral Tab Take 1 tablet (10 mg total) by mouth daily. 90 tablet 3    loteprednol 0.5 % Ophthalmic Suspension Place 1 drop into both eyes daily. 1% per pt       No current facility-administered medications on file prior to visit.       ASSESSMENT  Analyzed weight data:       Diagnoses and all orders for this visit:    Obesity (BMI 30.0-34.9)    At risk for cardiovascular event    PTSD (post-traumatic stress disorder)    Encounter for therapeutic drug monitoring    Coronary artery disease involving native coronary artery of native heart without angina pectoris    Other orders  -     metFORMIN  MG Oral Tablet 24 Hr; Take 1 tablet (750 mg total) by mouth daily with breakfast. Twice a day            PLAN   Consult 4/18/24 : 184 lb   Refill l methyl folate  Total time spent on chart review, pre-charting, obtaining history, counseling, and educating, reviewing labs was 30 minutes.  Reviewed shortness of breath  Completed cardiac work up   [place] covered but 200 per month and cost prohibitive  Reviewed prescription assistance programs   Reviewed role in CV risk reduction   High cost of medication   Reviewed prescription assistance program [employer] will assess not available   Continue metformin   Continue l methyl folate    -advised of side effects and adverse effects of this medication  Nutrition: low carb diet/ recommended to eat breakfast daily/ regular protein intake  Medication use and side effects reviewed with patient.  Medication contraindications: n/a  Follow up with dietitian and psychologist as recommended.  Discussed the role of sleep and stress in weight management.  Counseled on comprehensive weight loss plan including attention to nutrition, exercise and behavior/stress management for success. See patient instruction below for more details.  Discussed strategies to overcome barriers to successful weight loss and weight maintenance  FITTE: ACSM recommendations  (150-300 minutes/ week in active weight loss)   Weight Loss consent to treat reviewed and signed    There are no Patient Instructions on file for this visit.    No follow-ups on file.    Patient verbalizes understanding.    Alexandria Hassan MD  '    Answers submitted by the patient for this visit:  Medical Weight Loss Follow Up (Submitted on 9/4/2024)  If greater than 5/1O how would you grade your coping mechanisms?: fair

## 2024-10-26 ENCOUNTER — OFFICE VISIT (OUTPATIENT)
Dept: FAMILY MEDICINE CLINIC | Facility: CLINIC | Age: 69
End: 2024-10-26
Payer: MEDICARE

## 2024-10-26 VITALS
RESPIRATION RATE: 16 BRPM | TEMPERATURE: 97 F | SYSTOLIC BLOOD PRESSURE: 108 MMHG | BODY MASS INDEX: 33 KG/M2 | HEART RATE: 84 BPM | DIASTOLIC BLOOD PRESSURE: 60 MMHG | HEIGHT: 62.01 IN

## 2024-10-26 DIAGNOSIS — G31.9 BRAIN ATROPHY (HCC): ICD-10-CM

## 2024-10-26 DIAGNOSIS — R42 POSTURAL DIZZINESS WITH PRESYNCOPE: ICD-10-CM

## 2024-10-26 DIAGNOSIS — M35.3 PMR (POLYMYALGIA RHEUMATICA) (HCC): ICD-10-CM

## 2024-10-26 DIAGNOSIS — R06.09 DYSPNEA ON EXERTION: ICD-10-CM

## 2024-10-26 DIAGNOSIS — R55 POSTURAL DIZZINESS WITH PRESYNCOPE: ICD-10-CM

## 2024-10-26 DIAGNOSIS — G47.19 DAYTIME HYPERSOMNOLENCE: Primary | ICD-10-CM

## 2024-10-26 PROCEDURE — 99214 OFFICE O/P EST MOD 30 MIN: CPT | Performed by: STUDENT IN AN ORGANIZED HEALTH CARE EDUCATION/TRAINING PROGRAM

## 2024-10-26 NOTE — PROGRESS NOTES
[facility] Family Medicine Note  10/26/24    Chief Complaint   Patient presents with    Follow - Up     HPI:   Katelyn Gutierrez is a 69 year old female who presents for follow up.    Working with physical therapy, hoping to work with [employer] at the fitness center.    Patient's apple watch has noticed her oxygen saturation drops to the 70's at times and has difficulty. At nighttime sometimes oxygen will drop to the 80's.    Saw integrative health. Now on supplements and powders. Starting to feel better now but not completely better.  Patient is limiting physical activity. Still having dyspnea on exertion. Still having presyncope.    Lots of tiredness during the day. Snoring. One time snored so loud. Dryness in the morning and dry lips. Could doze off if watching TV.    Changing rheumatologists at the end of the year. Working with physical therapy for her hip. Has a lot of inflammation.    Losing weight.    Her  has long covid after having ground glass opacities, now he walks 5 miles a day several days a week.    To see cardiology next week.    She had a cousin who passed from sleep apnea, she had taken cold medicine before sleeping and passed away.    Patient had mammogram and it showed enlarged ducts and patient had an episode of nipple discharge.    Wt Readings from Last 6 Encounters:   10/23/24 180 lb (81.6 kg)   09/20/24 183 lb 12.8 oz (83.4 kg)   09/18/24 185 lb (83.9 kg)   09/11/24 184 lb (83.5 kg)   09/05/24 187 lb (84.8 kg)   07/29/24 185 lb (83.9 kg)       Past Medical History:    Abnormal pelvic exam    Bicuspid aortic valve    Formatting of this note might be different from the original.   Formatting of this note might be different from the original.   TTE 2022 w/o change   F/u with cardiology for serial monitoring    Cervical lesion    Coronary atherosclerosis    Current mild episode of major depressive disorder without prior episode (HCC)    Formatting of this note might  be different from the original.   Mood is improved.  Stable with therapy.   No si/hi.   PTSD, EMDR.   No pharmacologic rx  Formatting of this note might be different from the original.   Formatting of this note might be different from the original.   Mood is improved.  Stable with therapy.   No si/hi.   PTSD, EMDR.   No pharmacologic rx      Diabetes (HCC)    Fuchs' corneal dystrophy    Heart valve disease    High blood pressure    High cholesterol    Hypertension    Lyme disease    OSTEOARTHRITIS    Post-operative nausea and vomiting    pt states she often wakes up during surgery    PTSD (post-traumatic stress disorder)    9/11    Rotator cuff injury     Past Surgical History:   Procedure Laterality Date    Benign biopsy left Left 1993    Carpal tunnel release      Cath drug eluting stent      Eye surgery      Foot surgery      Hand/finger surgery unlisted Right 10/19/20--Dr. Paulo Shay    ring finger A1 pulley release/trigger finger release    Jose localization wire 1 site left (cpt=19281)      1990's bn    Other  1994    Other accessory      dissected LAD, double stented 8/11/23    Other surgical history      A RCR RIGHT SHOULDER     Other surgical history      TRIGGER RELEASE RIGHT THUMB    Other surgical history      CTR BILATERAL      Other surgical history      NEUROMA REMOVED FOOT     Repair rotator cuff,acute       Allergies[1]  No outpatient medications have been marked as taking for the 10/26/24 encounter (Office Visit) with Kenia Lamar MD.     Social History     Socioeconomic History    Marital status: Life Partner    Number of children: 1    Years of education: 17   Occupational History    Occupation: business      Comment: [employer]    Occupation: unemployed now   Tobacco Use    Smoking status: Former     Current packs/day: 0.00     Average packs/day: 1 pack/day for 2.0 years (2.0 ttl pk-yrs)     Types: Cigarettes     Start date: 5/27/1972     Quit date:  1974     Years since quittin.4    Smokeless tobacco: Never   Vaping Use    Vaping status: Never Used   Substance and Sexual Activity    Alcohol use: Yes     Comment: occasional    Drug use: No    Sexual activity: Yes     Partners: Female, Male     Comment: patient has one child    Other Topics Concern    Caffeine Concern Yes     Comment: 1 c. coffee in the morning    Exercise Yes     Comment: 1-3 miles/day    Seat Belt Yes    Reaction to local anesthetic No     Comment: Heart palpatations at [facility]    Pt has a pacemaker No    Pt has a defibrillator No   Social History Narrative    ** Merged History Encounter **          Counseling given: Not Answered    Family History   Problem Relation Age of Onset    Heart Disorder Father     Depression Sister     Kidney Disease Sister     Heart Disorder Maternal Grandfather     Heart Disorder Paternal Grandfather     Breast Cancer Paternal Aunt 70        Dx Breast CA age 70    Dementia Mother     Alcohol and Other Disorders Associated Mother     Bleeding Disorders Neg     Colon Cancer Neg     Ovarian Cancer Neg      Family Status   Relation Status    Fa  at age 80    Sis Alive    MGFA     PGFA     Pat Aunt Alive    Mo Alive        alzheimers    Bro Alive    MGMA     PGMA     NEG (Not Specified)        REVIEW OF SYSTEMS:   See HPI    EXAM:   /60   Pulse 84   Temp 97 °F (36.1 °C) (Temporal)   Resp 16   Ht 5' 2.01\" (1.575 m)   LMP  (LMP Unknown)   BMI 32.91 kg/m²  Estimated body mass index is 32.91 kg/m² as calculated from the following:    Height as of this encounter: 5' 2.01\" (1.575 m).    Weight as of 10/23/24: 180 lb (81.6 kg).   Vital signs reviewed. Appears stated age, well groomed.  Physical Exam:  GEN:  Patient is alert and oriented x3, no apparent distress  HEAD:  Normocephalic, atraumatic  HEENT:  no scleral icterus, conjunctivae clear bilaterally  LUNGS: clear to auscultation bilaterally, no  rales/rhonchi/wheezing  HEART:  Regular rate and rhythm, normal S1/S2, no murmurs, rubs or gallops  EXTREMITIES:  Moves all extremities well  NEURO:  CN 2 - 12 grossly intact, gait normal, patellar reflexes equal b/l    ASSESSMENT AND PLAN:   1. Daytime hypersomnolence  Patient has noticed daytime sleepiness and on her apple watch it shows low blood oxygen overnight. Patient does snore as well. Will check sleep study to evaluate for sleep apnea. Follow up pending results.  - Diagnostic Sleep Study-split night PAP implemented if criteria met  - General sleep study; Future    2. Dyspnea on exertion  To see cardiology next week, appreciate evaluation and recommendations.    3. PMR (polymyalgia rheumatica) ([facility])  6. Postural dizziness with presyncope  Following with rheumatology, appreciate evaluation and recommendations    4. Brain atrophy ([facility])  Had MRI and CT which showed parenchymal loss. Will refer to neurology, appreciate evaluation and recommendations  - Neuro Referral - In Network        Meds & Refills for this Visit:  Requested Prescriptions      No prescriptions requested or ordered in this encounter           Health Maintenance:  Health Maintenance Due   Topic Date Due    Colorectal Cancer Screening  Never done    Zoster Vaccines (1 of 2) Never done       Patient/Caregiver Education: There are no barriers to learning. Medical education done.   Outcome: Patient verbalizes understanding. Patient is notified to call with any questions, complications, allergies, or worsening or changing symptoms.  Patient is to call with any side effects or complications from the treatments as a result of today.     Problem List:  Patient Active Problem List   Diagnosis    PTSD (post-traumatic stress disorder)    Aortic valve sclerosis    Vertigo    Hypercholesterolemia    Osteopenia    Hair loss    Trigger ring finger of right hand    History of thumb surgery    Allergic conjunctivitis of both eyes    Bilateral carotid artery  stenosis    CAD (coronary artery disease)    Elevated C-reactive protein (CRP)    ESR raised    Fuchs' endothelial dystrophy    LUQ pain    Claudication (HCC)    Obesity    PMR (polymyalgia rheumatica) (HCC)    Polyarthralgia    Secondary hypercoagulable state (HCC)    Tear film insufficiency    Tachycardia    Thoracic aorta atherosclerosis (HCC)    SVT (supraventricular tachycardia) (HCC)    Other specified disorders of bone density and structure, unspecified site    General symptom    Dyspnea on exertion    Scoliosis of lumbar spine    Bicuspid aortic valve    Hypertension    Paroxysmal atrial fibrillation (HCC)       Return for pending results.    Kenia Lamar MD  [facility]  10/26/24      Please note that portions of this note may have been completed with a voice recognition program. Efforts were made to edit the dictations but occasionally words are mis-transcribed. Thank you for your understanding.           [1]   Allergies  Allergen Reactions    Codeine UNKNOWN     Vomittingall pain medications  Vomittingall pain medications    Monosodium Glutamate SWELLING    Monosodium Glutamate SWELLING     swelling    Sulfa Antibiotics HIVES    Atorvastatin MYALGIA    Fentanyl NAUSEA AND VOMITING    Hydrocodone NAUSEA AND VOMITING    Hydrocodone-Acetaminophen NAUSEA AND VOMITING     vomittingall pain medications  vomittingall pain medications  vomittingall pain medications  vomittingall pain medications    Jardiance [Empagliflozin] DIZZINESS    Augmentin [Amoxicillin-Pot Clavulanate]      Myalgia  Ringing in ear    Propoxyphene UNKNOWN     Vomittingall pain medications  Vomittingall pain medications    Ezetimibe NAUSEA ONLY    Propoxyphene N-Apap NAUSEA ONLY     Most pain medications cause nausea

## 2024-10-28 ENCOUNTER — APPOINTMENT (OUTPATIENT)
Dept: PHYSICAL THERAPY | Age: 69
End: 2024-10-28
Attending: STUDENT IN AN ORGANIZED HEALTH CARE EDUCATION/TRAINING PROGRAM
Payer: MEDICARE

## 2024-10-28 ENCOUNTER — TELEPHONE (OUTPATIENT)
Dept: PHYSICAL THERAPY | Facility: HOSPITAL | Age: 69
End: 2024-10-28

## 2024-10-29 ENCOUNTER — OFFICE VISIT (OUTPATIENT)
Dept: NEUROLOGY | Facility: CLINIC | Age: 69
End: 2024-10-29
Payer: MEDICARE

## 2024-10-29 ENCOUNTER — PROCEDURE VISIT (OUTPATIENT)
Dept: NEUROLOGY | Facility: CLINIC | Age: 69
End: 2024-10-29
Payer: MEDICARE

## 2024-10-29 ENCOUNTER — LAB ENCOUNTER (OUTPATIENT)
Dept: LAB | Age: 69
End: 2024-10-29
Attending: STUDENT IN AN ORGANIZED HEALTH CARE EDUCATION/TRAINING PROGRAM
Payer: MEDICARE

## 2024-10-29 VITALS
SYSTOLIC BLOOD PRESSURE: 120 MMHG | BODY MASS INDEX: 32.71 KG/M2 | WEIGHT: 180 LBS | DIASTOLIC BLOOD PRESSURE: 64 MMHG | HEART RATE: 83 BPM | RESPIRATION RATE: 16 BRPM | HEIGHT: 62.01 IN | OXYGEN SATURATION: 97 %

## 2024-10-29 DIAGNOSIS — R55 SYNCOPE, UNSPECIFIED SYNCOPE TYPE: ICD-10-CM

## 2024-10-29 DIAGNOSIS — R06.00 DYSPNEA, UNSPECIFIED TYPE: ICD-10-CM

## 2024-10-29 DIAGNOSIS — R29.2 HYPOREFLEXIA: ICD-10-CM

## 2024-10-29 DIAGNOSIS — G31.9 BRAIN ATROPHY (HCC): ICD-10-CM

## 2024-10-29 DIAGNOSIS — T56.1X4A TOXIC EFFECT OF MERCURY AND ITS COMPOUNDS, UNDETERMINED, INITIAL ENCOUNTER: ICD-10-CM

## 2024-10-29 DIAGNOSIS — R20.1 REDUCED SENSATION: ICD-10-CM

## 2024-10-29 DIAGNOSIS — R55 SYNCOPE, UNSPECIFIED SYNCOPE TYPE: Primary | ICD-10-CM

## 2024-10-29 LAB — VIT B12 SERPL-MCNC: 672 PG/ML (ref 211–911)

## 2024-10-29 PROCEDURE — 83519 RIA NONANTIBODY: CPT

## 2024-10-29 PROCEDURE — 82607 VITAMIN B-12: CPT

## 2024-10-29 PROCEDURE — 99205 OFFICE O/P NEW HI 60 MIN: CPT | Performed by: STUDENT IN AN ORGANIZED HEALTH CARE EDUCATION/TRAINING PROGRAM

## 2024-10-29 PROCEDURE — 86381 MITOCHONDRIAL ANTIBODY EACH: CPT

## 2024-10-29 PROCEDURE — 95908 NRV CNDJ TST 3-4 STUDIES: CPT | Performed by: OTHER

## 2024-10-29 PROCEDURE — 95886 MUSC TEST DONE W/N TEST COMP: CPT | Performed by: OTHER

## 2024-10-29 PROCEDURE — 36415 COLL VENOUS BLD VENIPUNCTURE: CPT

## 2024-10-29 NOTE — PATIENT INSTRUCTIONS
-EEG  -Mobile cardiac telemetry monitor referral. Please call [phone], 7 days  -Bloodwork, don't eat seafood for a week before test  -Electromyelogram and nerve conduction study (EMG/NCS)   -Aim for good control of prediabetes, hypertension, hyperlipidemia, healthy diet, exercise  -Sleep evaluation as previously advised  -Consider repeating MRI brain without contrast ~1 ye ~6/2025 for surveillance  -Continue PT    Refill policies:    Allow 2-3 business days for refills; controlled substances may take longer.  Contact your pharmacy at least 5 days prior to running out of medication and have them send an electronic request or submit request through the “request refill” option in your [employer] account.  Refills are not addressed on weekends; covering physicians do not authorize routine medications on weekends.  No narcotics or controlled substances are refilled after noon on Fridays or by on call physicians.  By law, narcotics must be electronically prescribed.  A 30 day supply with no refills is the maximum allowed.  If your prescription is due for a refill, you may be due for a follow up appointment.  To best provide you care, patients receiving routine medications need to be seen at least once a year.  Patients receiving narcotic/controlled substance medications need to be seen at least once every 3 months.  In the event that your preferred pharmacy does not have the requested medication in stock (e.g. Backordered), it is your responsibility to find another pharmacy that has the requested medication available.  We will gladly send a new prescription to that pharmacy at your request.    Scheduling Tests:    If your physician has ordered radiology tests such as MRI or CT scans, please contact Central Scheduling at [phone] right away to schedule the test.  Once scheduled, the [facility] Central[facility]erral Team will work with your insurance carrier to obtain pre-certification or prior authorization.  Depending on  your insurance carrier, approval may take 3-10 days.  It is highly recommended patients assure they have received an authorization before having a test performed.  If test is done without insurance authorization, patient may be responsible for the entire amount billed.      Precertification and Prior Authorizations:  If your physician has recommended that you have a procedure or additional testing performed the [facility] Centralized Referral Team will contact your insurance carrier to obtain pre-certification or prior authorization.    You are strongly encouraged to contact your insurance carrier to verify that your procedure/test has been approved and is a COVERED benefit.  Although the [facility] Centralized Referral Team does its due diligence, the insurance carrier gives the disclaimer that \"Although the procedure is authorized, this does not guarantee payment.\"    Ultimately the patient is responsible for payment.   Thank you for your understanding in this matter.  Paperwork Completion:  If you require FMLA or disability paperwork for your recovery, please make sure to either drop it off or have it faxed to our office at [phone]. Be sure the form has your name and date of birth on it.  The form will be faxed to our Forms Department and they will complete it for you.  There is a 25$ fee for all forms that need to be filled out.  Please be aware there is a 10-14 day turnaround time.  You will need to sign a release of information (DESI) form if your paperwork does not come with one.  You may call the Forms Department with any questions at [phone].  Their fax number is [phone].
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
## 2024-10-29 NOTE — PATIENT INSTRUCTIONS
Refill policies:    Allow 2-3 business days for refills; controlled substances may take longer.  Contact your pharmacy at least 5 days prior to running out of medication and have them send an electronic request or submit request through the “request refill” option in your [employer] account.  Refills are not addressed on weekends; covering physicians do not authorize routine medications on weekends.  No narcotics or controlled substances are refilled after noon on Fridays or by on call physicians.  By law, narcotics must be electronically prescribed.  A 30 day supply with no refills is the maximum allowed.  If your prescription is due for a refill, you may be due for a follow up appointment.  To best provide you care, patients receiving routine medications need to be seen at least once a year.  Patients receiving narcotic/controlled substance medications need to be seen at least once every 3 months.  In the event that your preferred pharmacy does not have the requested medication in stock (e.g. Backordered), it is your responsibility to find another pharmacy that has the requested medication available.  We will gladly send a new prescription to that pharmacy at your request.    Scheduling Tests:    If your physician has ordered radiology tests such as MRI or CT scans, please contact Central Scheduling at [phone] right away to schedule the test.  Once scheduled, the [facility] Centralized Referral Team will work with your insurance carrier to obtain pre-certification or prior authorization.  Depending on your insurance carrier, approval may take 3-10 days.  It is highly recommended patients assure they have received an authorization before having a test performed.  If test is done without insurance authorization, patient may be responsible for the entire amount billed.      Precertification and Prior Authorizations:  If your physician has recommended that you have a procedure or additional testing performed the [facility]  Centralized Referral Team will contact your insurance carrier to obtain pre-certification or prior authorization.    You are strongly encouraged to contact your insurance carrier to verify that your procedure/test has been approved and is a COVERED benefit.  Although the [facility] Centralized Referral Team does its due diligence, the insurance carrier gives the disclaimer that \"Although the procedure is authorized, this does not guarantee payment.\"    Ultimately the patient is responsible for payment.   Thank you for your understanding in this matter.  Paperwork Completion:  If you require FMLA or disability paperwork for your recovery, please make sure to either drop it off or have it faxed to our office at [phone]. Be sure the form has your name and date of birth on it.  The form will be faxed to our Forms Department and they will complete it for you.  There is a 25$ fee for all forms that need to be filled out.  Please be aware there is a 10-14 day turnaround time.  You will need to sign a release of information (DESI) form if your paperwork does not come with one.  You may call the Forms Department with any questions at [phone].  Their fax number is [phone].

## 2024-10-29 NOTE — PROGRESS NOTES
[facility]  [address], [place] [address]   [phone]        Full Name: Katelyn Gutierrez Gender: Female  Patient ID: [id number] YOB: 1955      Visit Date: 10/29/2024 1:24 PM  Age: 69 Years  Examining Physician: Reji Villafana  Referring Physician: Tisha Wilson  Height: 5 feet 2 inch  Weight: 180 lbs  History:   She has pain right posterior hip that somewhat radiates posterolateral thigh and then gets tingling medial sole of right foot and then tingle in the calf.  Aggravated by walking.  None in the left leg      Sensory NCS      Nerve / Sites Rec. Site Onset Lat Peak Lat NP Amp PP Amp Segments Distance Velocity Comment     ms ms µV µV  cm m/s    L Sural - (Antidromic)      Calf Ankle NR NR NR NR Calf - Ankle 14 NR    R Sural - (Antidromic)      Calf Ankle NR NR NR NR Calf - Ankle 14 NR        Motor NCS      Nerve / Sites Muscle Latency Amplitude Segments Dist. Lat Diff Velocity Comments     ms mV  cm ms m/s    R Peroneal - EDB      Ankle EDB 3.77 3.6 Ankle - EDB 8         B. Fib Head EDB 9.98 2.6 B. Fib Head - Ankle 30 6.21 48.3       A. Fib Head EDB 11.40 1.7 A. Fib Head - B. Fib Head 9 1.42 63.5    R Tibial - AH      Ankle AH 3.60 9.2 Ankle - AH 8         Knee AH 11.29 1.3 Knee - Ankle 39 7.69 50.7        F  Wave      Nerve M Latency F Latency F-M Lat    ms ms ms   R Tibial - AH 3.3 49.3 46.0       H Reflex      Nerve H Latency    ms   R Tibial - Soleus 29.7   L Tibial - Soleus 29.1       EMG Summary Table     Spontaneous MUAP Recruitment   Muscle IA Fib PSW Fasc H.F. Amp Dur. PPP Pattern   R. Gastrocnemius N None None None None N N N N   R. Tibialis anterior N None None None None N N N N   R. Peroneus longus N None None None None N N N N   R. Extensor hallucis longus N None None None None N N N N   R. Quadriceps N None None None None N N N N   R. L5 paraspinal N None suspicious trains None None       R. L4 paraspinal N None None None None        R. L3 paraspinal N None None None None             Katelyn Guzmanp [id number] 10/29/2024 1:24 PM     3 of 3  RESULTS:  H-reflex is normal and symmetric bilaterally  Sural responses were absent bilaterally  Right Tibial and Peroneal motor conduction studies including F-waves were within normal limits.  EMG of right leg were normal. The lumbar paraspinals showed suspicious trains of positive waves in the lower lumbar approximately L5S1 level      Conclusion:    No evidence of large fiber neuropathic process. Absence of Sural responses may be technical in nature given that H-reflexes which indicates either S1 root involvement or early sensory neuropathic process were normal bilaterally.  The suspicious trains of positive waves in the right lower lumbar paraspinals may indicate dorsal rami evidence of lower lumbar radicular process, Please correlate clinically      Reji Villafana MD  Neurology      Katelyn Guzmanp [id number] 10/29/2024 1:24 PM     3 of 3

## 2024-10-30 ENCOUNTER — PATIENT MESSAGE (OUTPATIENT)
Dept: NEUROLOGY | Facility: CLINIC | Age: 69
End: 2024-10-30

## 2024-10-30 DIAGNOSIS — R29.2 HYPOREFLEXIA: Primary | ICD-10-CM

## 2024-10-30 DIAGNOSIS — R94.131 ABNORMAL EMG: ICD-10-CM

## 2024-10-30 DIAGNOSIS — R20.0 NUMBNESS IN RIGHT LEG: ICD-10-CM

## 2024-11-01 ENCOUNTER — MED REC SCAN ONLY (OUTPATIENT)
Dept: FAMILY MEDICINE CLINIC | Facility: CLINIC | Age: 69
End: 2024-11-01

## 2024-11-01 NOTE — TELEPHONE ENCOUNTER
Please relay to Katelyn, MR L spine with and without contrast ordered. Thanks for the update. Tisha

## 2024-11-04 ENCOUNTER — OFFICE VISIT (OUTPATIENT)
Dept: PHYSICAL THERAPY | Age: 69
End: 2024-11-04
Attending: STUDENT IN AN ORGANIZED HEALTH CARE EDUCATION/TRAINING PROGRAM
Payer: MEDICARE

## 2024-11-04 PROCEDURE — 97110 THERAPEUTIC EXERCISES: CPT

## 2024-11-04 NOTE — PROGRESS NOTES
Referring Diagnosis:  Scoliosis of lumbar spine, unspecified scoliosis type (M41.9); Chronic right-sided low back pain with right-sided sciatica (M54.41,G89.29);Anterolisthesis of lumbar spine (M43.16)   Referring Provider: Brianda Date of Evaluation: 9/16/2024   Precautions: ”None” Next MD visit: none scheduled   Date of Surgery: n/a    Insurance Primary/Secondary: MEDICARE / [employer]BS [place] INDEMNITY     # Auth Visits: 10 POC     Subjective: Had EMG last week, scheduled for lumbar MRI in December. Reports nighttime oxygen readings to PCP, sleep study scheduled for 11/11/24. Was on feet for extended periods yesterday, had increased pain at night. Limiting activity for the most part helps to manage symptoms and shortness of breath.   Current Pain: 0.5/10    Objective:   Lumbar AROM:  Extension: min restriction 0/10 LBP into right hip (0/10 LBP/right hip pain following mobilization)  Right Rotation: WNL  Left Rotation: WNL   Right Lateral Flexion: fingers to mid shin  Left Lateral Flexion: fingers to mid shin    Assessment: Symptoms consistent with functional activities compared to last session. Overall, improved lumbar AROM, but still having to limited daily activities due to pain. Had EMG which resulted in MRI being scheduled. Pt requested holding PT interventions until she is able to complete additional testing.       Goals:   Pt will be able to complete lumbar right lateral flexion (fingers to mid shin) pain free (4 visits) Met  Pt will be able to walk >10 minutes without increased symptoms (8 visits)  Pt will be able to sit for >2 hours without increased pain (8 visits)  Pt will be able to walk up incline without increased pain (8 visits)    Plan: Continue HEP, hold PT pending sleep study and MRI scheduled in the next couple of weeks,  follow-up afterwards to determine POC progression.    Date:   9/18/2024  TX#: 2 Date:  10/2/2024  TX#: 3 Date:  10/7/2024  TX#: 4 Date:  10/9/2024  TX#: 5 Date:  10/14/2024  TX#: 6  Date:  10/16/2024  TX#: 7 Date:  11/4/2024  TX#: 8   TherEx:  -Lumbar extension AROM x10 reps  -Lumbar right lateral flexion AROM x10 reps  -Bridge 2x10 reps TherEx:  -Lumbar extension AROM x10 reps  -Lumbar right lateral flexion AROM x10 reps  -Standing wall marching, R/L x5 reps TherEx:  -Lumbar extension AROM x10 reps  -Lumbar right lateral flexion AROM x10 reps  -Alternating unilateral hip ER, hooklying, red band 3 sec hold x15 reps  -Bilateral hip ER with posterior pelvic tilt 3 sec hold x15 reps  -Standing wall marching, R/L x5 reps TherEx:  -Alternating unilateral hip ER, hooklying, red band 3 sec hold x15 reps  -Bilateral hip ER with posterior pelvic tilt 3 sec hold x15 reps  -Standing wall marching, R/L x5 reps  -Partial lunge, R/L x15 reps TherEx:  -Alternating unilateral hip ER, hooklying, red band 3 sec hold x15 reps  -Bilateral hip ER with posterior pelvic tilt 3 sec hold x15 reps  -Standing wall marching, R/L x5 reps  -Partial lunge, R/L x15 reps TherEx:  -LAQ 2# ankle weight R/L 2x10 reps  -Bilateral hip ER with posterior pelvic tilt 3 sec hold x15 reps  -Standing wall marching, R/L x5 reps  -Partial lunge, R/L x15 reps TherEx:  -LAQ 2# ankle weight R/L 2x10 reps  -Bilateral hip ER with posterior pelvic tilt 3 sec hold x15 reps  -Standing wall marching, R/L x5 reps  -Pt education exercise progression   Manual:  -L5/S1 R UPA gr II x10 min Manual:  -L5/S1 R UPA gr II x10 min Manual:  -L5/S1 R UPA gr II x10 min Manual:  -L5/S1 R UPA gr II x10 min Manual:  -L5/S1 R UPA gr III x10 min Manual:  -L5/S1 R UPA gr III x10 min  -     HEP: Lumbar rotation in sitting, supine clams with pelvic tilting    Charges: TherEx x2 (30 min)       Total Timed Treatment: 30 min  Total Treatment Time: 30 min

## 2024-11-06 ENCOUNTER — TELEPHONE (OUTPATIENT)
Dept: NEUROLOGY | Facility: CLINIC | Age: 69
End: 2024-11-06

## 2024-11-06 DIAGNOSIS — T57.0X4S: ICD-10-CM

## 2024-11-06 DIAGNOSIS — T56.1X4A TOXIC EFFECT OF MERCURY AND ITS COMPOUNDS, UNDETERMINED, INITIAL ENCOUNTER: ICD-10-CM

## 2024-11-06 DIAGNOSIS — R94.131 ABNORMAL EMG: ICD-10-CM

## 2024-11-06 DIAGNOSIS — R20.0 NUMBNESS IN RIGHT LEG: ICD-10-CM

## 2024-11-06 DIAGNOSIS — R29.2 HYPOREFLEXIA: Primary | ICD-10-CM

## 2024-11-06 DIAGNOSIS — T56.3X4S: ICD-10-CM

## 2024-11-06 DIAGNOSIS — Z77.29: ICD-10-CM

## 2024-11-06 DIAGNOSIS — T56.0X4S TOXIC EFFECT OF LEAD AND ITS COMPOUNDS, UNDETERMINED, SEQUELA: ICD-10-CM

## 2024-11-06 NOTE — TELEPHONE ENCOUNTER
Patient wants to know if we can changed her MRI's order for [facility] and the blood orders are not meting medical necessity for medicare one of the orders is $600 please changed the codes on the lab orders thanks

## 2024-11-07 NOTE — TELEPHONE ENCOUNTER
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  At this exposure time, she did have positive blood test with mercury.    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    Her history along with symptoms are prompting blood orders.     Will need to update Dr Wilson's diagnoses and notes to get insurance coverage; patient aware not to complete bloodwork until contacted by this office.    Contact information given for [facility], MRI order updated and patient will cancel the MRI scheduled at [facility].

## 2024-11-07 NOTE — TELEPHONE ENCOUNTER
Regarding Heavy Metal panels (serum and urine) not meeting Medicare Medical Necessity:    Office visit notes state:  \"Regarding right foot numbness for months without clear impetus or associated symptoms, will pursue bloodwork \" and \"Regarding mild global parenchymal volume loss on MRI brain felt possibly related to vascular risk factors, history of head trauma, possibly related to history of heavy metal poisoning; will check some blood work \"    Note that the orders had the following diagnoses attached:  Reduced sensation   Hyporeflexia  Brain atrophy (HCC)  Toxic effect of mercury and its compounds, undetermined, initial encounter    Per CMS guidelines, T56.1X4A Toxic effect of mercury and its compounds, undetermined, initial encounter documents medical necessity for a Heavy Metals panel.  This was included on initial dx list, but likely the other diagnoses included on the order blocked this one.    When attempting to update the order diagnosis, discovered that only certain parts of the heavy metal panel are not covered by this diagnosis:        None of the patient's current symptoms or findings from exam or past imaging seem to correspond with available medical necessity diagnoses codes for these particular assays.  Have sent Dr. Wilson a message with list of codes to see if she would like to switch the order to just Mercury, or if she has additional information on patient history and is able to select appropriate codes for currently ordered panels.

## 2024-11-11 ENCOUNTER — APPOINTMENT (OUTPATIENT)
Dept: PHYSICAL THERAPY | Age: 69
End: 2024-11-11
Attending: STUDENT IN AN ORGANIZED HEALTH CARE EDUCATION/TRAINING PROGRAM
Payer: MEDICARE

## 2024-11-11 NOTE — CONSULTS
Breast Surgery New Patient Consultation    Katelyn Gutierrez is a 69 year old patient, referred by Dr. Lamar, who presents for evaluation of left breast nipple discharge.    History of Present Illness:   Ms. Katelyn Gutierrez is a 69 year old woman with a past history significant for HTN, cardiac stents (on prasugrel), and diabetes who presents for evaluation of left nipple discharge. She denies any palpable masses, skin changes, or axillary adenopathy.  She does have a history of left breast excisional biopsy in 1993, this was benign. She does have family history of breast cancer.  Her paternal aunt had breast cancer at age 70.  She does not have a history of genetic testing.    She states that around 8/2024 she had some white nipple discharge, described as white, thick, and pasty, come from her left nipple. She had a similar event about 5 years ago. She denies any nipple discharge that is red, bloody, brown, spontaneous, or clear.    She underwent diagnostic mammogram on 8/15/2024.  This showed density D breast tissue.  There was no mammographic or sonographic abnormality to explain the patient's breast pain.  She also was stated she had nipple discharge of the left breast and targeted ultrasound did not show any filling defects. She has chronically inverted nipples bilaterally.          Past Medical History:    Abnormal pelvic exam    Bicuspid aortic valve    Formatting of this note might be different from the original.   Formatting of this note might be different from the original.   TTE 2022 w/o change   F/u with cardiology for serial monitoring    Cervical lesion    Coronary atherosclerosis    Current mild episode of major depressive disorder without prior episode (HCC)    Formatting of this note might be different from the original.   Mood is improved.  Stable with therapy.   No si/hi.   PTSD, EMDR.   No pharmacologic rx  Formatting of this note might be different from the original.   Formatting of  this note might be different from the original.   Mood is improved.  Stable with therapy.   No si/hi.   PTSD, EMDR.   No pharmacologic rx      Diabetes (HCC)    Fuchs' corneal dystrophy    Heart valve disease    High blood pressure    High cholesterol    Hypertension    Lyme disease    OSTEOARTHRITIS    Post-operative nausea and vomiting    pt states she often wakes up during surgery    PTSD (post-traumatic stress disorder)        Rotator cuff injury       Past Surgical History:   Procedure Laterality Date    Benign biopsy left Left     Carpal tunnel release      Cath drug eluting stent      Eye surgery      Foot surgery      Hand/finger surgery unlisted Right 10/19/20--Dr. Paulo Shay    ring finger A1 pulley release/trigger finger release    Jose localization wire 1 site left (cpt=19281)       bn    Other      Other accessory      dissected LAD, double stented 23    Other surgical history      A RCR RIGHT SHOULDER     Other surgical history      TRIGGER RELEASE RIGHT THUMB    Other surgical history      CTR BILATERAL      Other surgical history      NEUROMA REMOVED FOOT     Repair rotator cuff,acute         Gynecological History:  Menarche at age 11 and LMP   Pt is a   Pt was 23 years old at time of first pregnancy.    She has cumulative breastfeeding history of 6-8 months.  Age of Menopause: 57  Type: natural  She denies any history of hormone replacement therapy  She has history of oral contraceptive use for 2 months,  last .   She denies infertility treatment to achieve pregnancy.    Medications:    No outpatient medications have been marked as taking for the 24 encounter (Office Visit) with Reji Mays MD.       Allergies:    Allergies[1]    Family History:   Family History   Problem Relation Age of Onset    Heart Disorder Father     Depression Sister     Kidney Disease Sister     Heart Disorder Maternal Grandfather     Heart Disorder Paternal Grandfather     Breast  Cancer Paternal Aunt 70        Dx Breast CA age 70    Dementia Mother     Alcohol and Other Disorders Associated Mother     Bleeding Disorders Neg     Colon Cancer Neg     Ovarian Cancer Neg        She is not of Ashkenazi Faith ancestry.    Social History:  History   Alcohol Use    Yes     Comment: occasional       History   Smoking Status    Former    Types: Cigarettes   Smokeless Tobacco    Never       Review of Systems:    Review of Systems   Constitutional:  Negative for activity change, appetite change, chills, fatigue and unexpected weight change.   HENT:  Negative for ear pain, hearing loss, nosebleeds, sore throat, trouble swallowing and voice change.    Eyes:  Negative for pain and visual disturbance.   Respiratory:  Negative for cough, chest tightness and shortness of breath.    Cardiovascular:  Negative for chest pain, palpitations and leg swelling.   Gastrointestinal:  Negative for nausea, vomiting, abdominal pain, diarrhea, constipation and blood in stool.   Endocrine: Negative for cold intolerance and heat intolerance.   Genitourinary:  Negative for dysuria, hematuria and difficulty urinating.   Musculoskeletal:  Positive for back pain, joint pain and neck pain. Negative for joint swelling.   Skin:  Negative for color change, rash and wound.   Allergic/Immunologic: Negative for environmental allergies.   Neurological:  Negative for tremors, syncope, facial asymmetry, speech difficulty and weakness.   Hematological:  Negative for adenopathy. Does not bruise/bleed easily.   Psychiatric/Behavioral:  Negative for hallucinations, behavioral problems, confusion, agitation and depressed mood.       Otherwise as per HPI.    Physical Exam:    /77   Pulse 63   Temp 97.4 °F (36.3 °C) (Tympanic)   Resp 16   Wt 81.6 kg (180 lb)   LMP  (LMP Unknown)   SpO2 97%   BMI 32.91 kg/m²     Physical Exam  Vitals reviewed.   Constitutional:       Appearance: Normal appearance.   HENT:      Head: Normocephalic  and atraumatic.   Eyes:      Extraocular Movements: Extraocular movements intact.      Pupils: Pupils are equal, round, and reactive to light.   Cardiovascular:      Rate and Rhythm: Normal rate.   Pulmonary:      Effort: Pulmonary effort is normal.   Chest:   Breasts:     Right: Inverted nipple present. No mass, nipple discharge, skin change or tenderness.      Left: Inverted nipple present. No mass, nipple discharge, skin change or tenderness.   Abdominal:      General: Abdomen is flat.      Palpations: Abdomen is soft.   Musculoskeletal:         General: Normal range of motion.      Cervical back: Normal range of motion and neck supple.   Lymphadenopathy:      Upper Body:      Right upper body: No supraclavicular or axillary adenopathy.      Left upper body: No supraclavicular or axillary adenopathy.   Skin:     General: Skin is warm and dry.   Neurological:      General: No focal deficit present.      Mental Status: She is alert and oriented to person, place, and time.   Psychiatric:         Mood and Affect: Mood normal.        Back: sebaceous cyst, midline lower back. No active inflammation     Bra size: 38B (M-L)    Labs/imaging: reviewed in EPIC    Impression:   Ms. Katelyn Gutierrez is a 69 year old woman that presents for left nipple discharge    Discussion and Plan:  I had a discussion with the Patient regarding her breast exam. On exam today I found no evidence of disease. I personally reviewed her recent imaging and we discussed this. We discussed We discussed nipple discharge and that it can be physiological (due to lactation) or pathological (due to mass). We discussed that if it is caused by a mass, usually we are able to see it on imaging. Worrisome nipple discharge would be bloody or clear, coming from one duct, and one breast (not both). What usually causes unilateral, brown/bloody nipple discharge is a mass called papilloma. These are usually benign but we often do remove them. Sometimes we can  watch them with imaging. Green or yellow bilateral nipple discharge coming from multiple ducts could indicate a benign condition called duct ectasia. This patient likely has some duct ectasia or simply the inverted nipple is collecting some keratin/sebum and over time it accumulates and then is expressed. I discussed that I am not concerned for a cancer at this time. We discussed the above \"alarm\" symptoms and she will continue to practice breast awareness and undergo physical exams.     Further imaging:   Mammogram: Annual mammogram August 2025 (ordered)  Ultrasound: Patient would benefit from screening ultrasound due to dense breast tissue, but Medicare will not cover this test at this time    For the sebaceous cyst, recommend general surgery visit. Discussed that removal of this cyst could also be performed as an office procedure sometimes. She does not have active infection/inflammation but it is in an area that rubs on her clothing and bra and is uncomfortable.     Return to clinic: 1 year or as needed    She was given ample opportunity for questions and those questions were answered to her satisfaction. She has been encouraged to contact the office with any questions or concerns. This encounter lasted a total of 55 minutes, more than 50% of which was dedicated to the discussion of management options.     Reji Mays MD  Breast Surgical Oncology    CC: Dr. Lamar            [1]   Allergies  Allergen Reactions    Codeine UNKNOWN     Vomittingall pain medications  Vomittingall pain medications    Monosodium Glutamate SWELLING    Monosodium Glutamate SWELLING     swelling    Sulfa Antibiotics HIVES    Atorvastatin MYALGIA    Fentanyl NAUSEA AND VOMITING    Hydrocodone NAUSEA AND VOMITING    Hydrocodone-Acetaminophen NAUSEA AND VOMITING     vomittingall pain medications  vomittingall pain medications  vomittingall pain medications  vomittingall pain medications    Jardiance [Empagliflozin] DIZZINESS     Augmentin [Amoxicillin-Pot Clavulanate]      Myalgia  Ringing in ear    Propoxyphene UNKNOWN     Vomittingall pain medications  Vomittingall pain medications    Ezetimibe NAUSEA ONLY    Propoxyphene N-Apap NAUSEA ONLY     Most pain medications cause nausea

## 2024-11-12 ENCOUNTER — NURSE ONLY (OUTPATIENT)
Dept: ELECTROPHYSIOLOGY | Facility: HOSPITAL | Age: 69
End: 2024-11-12
Attending: STUDENT IN AN ORGANIZED HEALTH CARE EDUCATION/TRAINING PROGRAM
Payer: MEDICARE

## 2024-11-12 DIAGNOSIS — R55 SYNCOPE, UNSPECIFIED SYNCOPE TYPE: ICD-10-CM

## 2024-11-12 DIAGNOSIS — R06.00 DYSPNEA, UNSPECIFIED TYPE: ICD-10-CM

## 2024-11-12 PROCEDURE — 95819 EEG AWAKE AND ASLEEP: CPT

## 2024-11-12 NOTE — TELEPHONE ENCOUNTER
Per Dr. Wilson, because patient had known ground zero exposure, she was exposed to multiple heavy metals including the ones she is testing. Diagnosis selected and approved.    Patient updated.

## 2024-11-13 ENCOUNTER — OFFICE VISIT (OUTPATIENT)
Facility: CLINIC | Age: 69
End: 2024-11-13
Payer: MEDICARE

## 2024-11-13 VITALS
OXYGEN SATURATION: 97 % | HEART RATE: 63 BPM | BODY MASS INDEX: 33 KG/M2 | TEMPERATURE: 97 F | DIASTOLIC BLOOD PRESSURE: 77 MMHG | SYSTOLIC BLOOD PRESSURE: 139 MMHG | RESPIRATION RATE: 16 BRPM | WEIGHT: 180 LBS

## 2024-11-13 DIAGNOSIS — Z12.31 ENCOUNTER FOR SCREENING MAMMOGRAM FOR MALIGNANT NEOPLASM OF BREAST: Primary | ICD-10-CM

## 2024-11-13 DIAGNOSIS — N64.52 NIPPLE DISCHARGE: ICD-10-CM

## 2024-11-18 ENCOUNTER — APPOINTMENT (OUTPATIENT)
Dept: PHYSICAL THERAPY | Age: 69
End: 2024-11-18
Attending: STUDENT IN AN ORGANIZED HEALTH CARE EDUCATION/TRAINING PROGRAM
Payer: MEDICARE

## 2024-11-18 NOTE — PROCEDURES
ELECTROENCEPHALOGRAM REPORT      Patient Name: Katelyn Gutierrez   : 1955    Date of Test: 24   History: 69 year old female with syncope    TECHNICAL ASPECTS OF EEG RECORDING:  This is a scalp EEG monitoring study. Scalp electrodes were positioned according to the 10-20 International system of electrode placement. EEG data were recorded continuously and digitally stored, and this is a routine study. No sedation was given. EEG data were reviewed using bipolar and referential montages. Video recording is also present.    DESCRIPTION OF EEG FINDINGS:  BACKGROUND ACTIVITY: The background rhythm consists of well organized, medium amplitude 10 Hertz waves, which are symmetrical. They do attenuate with eye opening.     INTERICTAL EPILEPTIFORM ACTIVITY: None  ICTAL ACTIVITY: None  ACTIVATION PROCEDURES:  Hyperventilation produced no epileptiform activity. Photic stimulation produced no epileptiform activity.  SLEEP: Sleep demonstrated decreased amplitude with diffuse slowing and no epileptiform activity.     IMPRESSION:  This is a normal, awake and asleep electroencephalogram.  There is no clear-cut focal abnormality or epileptiform activity seen in this record.      Angela Rivas DO  Neuromuscular and General Neurology  [facility]

## 2024-11-19 ENCOUNTER — TELEPHONE (OUTPATIENT)
Dept: INTERNAL MEDICINE CLINIC | Facility: CLINIC | Age: 69
End: 2024-11-19

## 2024-11-19 LAB
ACHR BINDING ABS: 0.03 NMOL/L
ACHR BLOCKING ABS: 12 %
ACHR-MODULATING AB: 9 %
MUSK ABS, SERUM: <1 U/ML
STRIATIONAL ABS, SERUM: NEGATIVE

## 2024-11-20 ENCOUNTER — OFFICE VISIT (OUTPATIENT)
Dept: INTERNAL MEDICINE CLINIC | Facility: CLINIC | Age: 69
End: 2024-11-20
Payer: MEDICARE

## 2024-11-20 ENCOUNTER — OFFICE VISIT (OUTPATIENT)
Dept: INTEGRATIVE MEDICINE | Facility: CLINIC | Age: 69
End: 2024-11-20
Payer: MEDICARE

## 2024-11-20 ENCOUNTER — TELEPHONE (OUTPATIENT)
Dept: NEUROLOGY | Facility: CLINIC | Age: 69
End: 2024-11-20

## 2024-11-20 VITALS — SYSTOLIC BLOOD PRESSURE: 120 MMHG | OXYGEN SATURATION: 96 % | DIASTOLIC BLOOD PRESSURE: 60 MMHG | HEART RATE: 76 BPM

## 2024-11-20 DIAGNOSIS — Z86.19 HISTORY OF LYME DISEASE: ICD-10-CM

## 2024-11-20 DIAGNOSIS — F43.10 PTSD (POST-TRAUMATIC STRESS DISORDER): ICD-10-CM

## 2024-11-20 DIAGNOSIS — E66.811 OBESITY (BMI 30.0-34.9): Primary | ICD-10-CM

## 2024-11-20 DIAGNOSIS — M35.3 PMR (POLYMYALGIA RHEUMATICA) (HCC): ICD-10-CM

## 2024-11-20 DIAGNOSIS — R63.5 WEIGHT GAIN: ICD-10-CM

## 2024-11-20 DIAGNOSIS — M25.50 POLYARTHRALGIA: ICD-10-CM

## 2024-11-20 DIAGNOSIS — R53.82 CHRONIC FATIGUE: ICD-10-CM

## 2024-11-20 DIAGNOSIS — R65.10 SYSTEMIC INFLAMMATORY RESPONSE SYNDROME (HCC): Primary | ICD-10-CM

## 2024-11-20 DIAGNOSIS — R29.90 NEUROLOGICAL DYSFUNCTION: ICD-10-CM

## 2024-11-20 PROCEDURE — G2211 COMPLEX E/M VISIT ADD ON: HCPCS | Performed by: PHYSICIAN ASSISTANT

## 2024-11-20 PROCEDURE — 99215 OFFICE O/P EST HI 40 MIN: CPT | Performed by: PHYSICIAN ASSISTANT

## 2024-11-20 RX ORDER — METFORMIN HYDROCHLORIDE 750 MG/1
750 TABLET, EXTENDED RELEASE ORAL 2 TIMES DAILY WITH MEALS
Qty: 180 TABLET | Refills: 0 | Status: SHIPPED | OUTPATIENT
Start: 2024-11-20
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
## 2024-11-20 NOTE — PATIENT INSTRUCTIONS
Plan   Dust - wear mask with cleaning  Keep dust out of the room   Shower before getting in bed     Meditation and calm   Sound bath - singing bowls and binaural beats     Plan  1) switch probiotic 5 billion CFU, when struggling 50 billion CFU   Pure encapsulation     2) Springer 2 calm - 1 twice a day when having a flare   One once a day and try to decrease to only as needed   Stop turmeric   3) stop omegas one three times a week   See if syncope is improving    4) temporarily stop metformin for 7-10 days   5) histdao - continue as needed   6) continue electrolyte and aminos body fit   7) Gi repair powder once a day - once gut is improved start to decrease   8) She is doing 2 mitocore   9) Orthomune 2 once a day and increase when starting to experience illness   10 continue lysine - through cold and flu season

## 2024-11-20 NOTE — PROGRESS NOTES
FOLLOW UP NUTRITION CONSULTATION  Nutrition Assessment    Nutrition related diagnoses:Obesity, polymyalgia, dissected LAD, prediabetes, PTSD, HTN    Number of consultations with dietitian: 4    Height:  Ht Readings from Last 1 Encounters:   10/29/24 5' 2.01\" (1.575 m)       Weight:   Wt Readings from Last 2 Encounters:   11/13/24 180 lb (81.6 kg)   10/29/24 180 lb (81.6 kg)       BMI:  BMI Readings from Last 1 Encounters:   11/13/24 32.91 kg/m²       Weight change: Decrease of 5 lbs in the past 2 months    Current weight loss medications: Metformin  mg BID      Current Diet/Changes in Eating Habits: Increased vegetarian protein intake      Diet/Lifestyle Modifications Following Previous Nutrition Consultation      Food journal: no    Oral recall:  Breakfast Spiced tofu/1/2 PB sandwich and coffee with oatmilk  Lunch  [employer] cheese and cracker protein box  Dinner HB egg, oatmilk frozen bar with chocolate coating  Snacks nuts  Beverages: water    Physical activity: none.  Waiting to see MD about back pain.  MRI showed muscle tear    Spent 15 minutes in consultation with the patient.    Assessment:   Pt with weight loss related to reduced fat intake, reduced meat.    Advised:  Pt was encouraged to increase fruits and vegetables,  Reviewed anti-inflammatory benefits of produce.      Assisted :  Pt reports lack of fruits and vegetable in eating habits due to difficulty chopping with RA pain.  Resources for prepped vegetables were provided.      Agreed:  Pt agreed to increase fruit and vegetable intake and continue adequate protein at all meals.    Arranged:   Follow up scheduled 1/21/25    Westley Blanchard MS, RD, LDN

## 2024-11-21 ENCOUNTER — TELEPHONE (OUTPATIENT)
Dept: NEUROLOGY | Facility: CLINIC | Age: 69
End: 2024-11-21

## 2024-11-21 NOTE — TELEPHONE ENCOUNTER
Patient completed MRI imaging on 11/19. Would like to discuss with Dr. Wilson when available.     Wants to know based on results if she needs to follow up with neurology or neurosurgery. Would like to know who Dr. Wilson would recommend for neurology. Patient is concerned about time frames for follow ups due to her mobility getting worse.     Has physical therapy and therapist wants to know how to adjust treatment based on results. Next appointment with therapist 11/25.     Please advise.

## 2024-11-24 ENCOUNTER — TELEPHONE (OUTPATIENT)
Dept: NEUROLOGY | Facility: CLINIC | Age: 69
End: 2024-11-24

## 2024-11-24 DIAGNOSIS — M48.061 SPINAL STENOSIS OF LUMBAR REGION, UNSPECIFIED WHETHER NEUROGENIC CLAUDICATION PRESENT: Primary | ICD-10-CM

## 2024-11-25 ENCOUNTER — APPOINTMENT (OUTPATIENT)
Dept: PHYSICAL THERAPY | Age: 69
End: 2024-11-25
Attending: STUDENT IN AN ORGANIZED HEALTH CARE EDUCATION/TRAINING PROGRAM
Payer: MEDICARE

## 2024-11-25 ENCOUNTER — TELEPHONE (OUTPATIENT)
Dept: PHYSICAL THERAPY | Age: 69
End: 2024-11-25

## 2024-11-25 NOTE — TELEPHONE ENCOUNTER
Nursing, please call patient to relay the following:   MRI lumbar spine with lumbar spinal canal narrowing which may be causing pinched nerves and may explain her right foot numbness. I have placed a referral to Dr. Givens with neurosurgery, please give her contact information.     Please also relay that if she has any red flag symptoms, e.g. sudden onset or significant worsening bowel/bladder function (e.g. incontinence), motor, sensory symptoms, she should go to ED.     I will aim to call her sometime this week as well.     Thanks! Tisha

## 2024-11-25 NOTE — TELEPHONE ENCOUNTER
Pt arrived for appointment. Still awaiting consult with physician regarding MRI, would like to hold PT until she has this consult. Today's appointment canceled.

## 2024-12-02 ENCOUNTER — APPOINTMENT (OUTPATIENT)
Dept: PHYSICAL THERAPY | Age: 69
End: 2024-12-02
Attending: STUDENT IN AN ORGANIZED HEALTH CARE EDUCATION/TRAINING PROGRAM
Payer: MEDICARE

## 2024-12-10 ENCOUNTER — OFFICE VISIT (OUTPATIENT)
Dept: RHEUMATOLOGY | Facility: CLINIC | Age: 69
End: 2024-12-10
Payer: MEDICARE

## 2024-12-10 VITALS
DIASTOLIC BLOOD PRESSURE: 74 MMHG | HEART RATE: 88 BPM | BODY MASS INDEX: 33.46 KG/M2 | WEIGHT: 181.81 LBS | SYSTOLIC BLOOD PRESSURE: 118 MMHG | OXYGEN SATURATION: 98 % | RESPIRATION RATE: 16 BRPM | TEMPERATURE: 97 F | HEIGHT: 62 IN

## 2024-12-10 DIAGNOSIS — Z11.59 NEED FOR HEPATITIS B SCREENING TEST: ICD-10-CM

## 2024-12-10 DIAGNOSIS — M35.3 PMR (POLYMYALGIA RHEUMATICA) (HCC): Primary | ICD-10-CM

## 2024-12-10 DIAGNOSIS — H18.513 FUCHS' CORNEAL DYSTROPHY OF BOTH EYES: ICD-10-CM

## 2024-12-10 DIAGNOSIS — Z11.1 SCREENING FOR TUBERCULOSIS: ICD-10-CM

## 2024-12-10 DIAGNOSIS — Z11.59 NEED FOR HEPATITIS C SCREENING TEST: ICD-10-CM

## 2024-12-10 DIAGNOSIS — E78.00 HYPERCHOLESTEROLEMIA: ICD-10-CM

## 2024-12-10 PROCEDURE — G2211 COMPLEX E/M VISIT ADD ON: HCPCS | Performed by: INTERNAL MEDICINE

## 2024-12-10 PROCEDURE — 99204 OFFICE O/P NEW MOD 45 MIN: CPT | Performed by: INTERNAL MEDICINE
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
## 2024-12-10 NOTE — PATIENT INSTRUCTIONS
Please call [facility] records and mail right hand X-ray CD to me for me to review.   -DATE OF SERVICE: 03.25.2023     If the x-rays are borderline or negative, we could consider a gout CT scan of the hand given your hand exam findings and your father with gout.    Get blood work

## 2024-12-11 ENCOUNTER — APPOINTMENT (OUTPATIENT)
Dept: PHYSICAL THERAPY | Age: 69
End: 2024-12-11
Attending: STUDENT IN AN ORGANIZED HEALTH CARE EDUCATION/TRAINING PROGRAM
Payer: MEDICARE

## 2024-12-11 ENCOUNTER — OFFICE VISIT (OUTPATIENT)
Dept: SURGERY | Facility: CLINIC | Age: 69
End: 2024-12-11
Payer: MEDICARE

## 2024-12-11 VITALS
WEIGHT: 180 LBS | SYSTOLIC BLOOD PRESSURE: 140 MMHG | HEART RATE: 80 BPM | HEIGHT: 63 IN | DIASTOLIC BLOOD PRESSURE: 70 MMHG | BODY MASS INDEX: 31.89 KG/M2

## 2024-12-11 DIAGNOSIS — M43.16 SPONDYLOLISTHESIS OF LUMBAR REGION: Primary | ICD-10-CM

## 2024-12-11 PROCEDURE — 99204 OFFICE O/P NEW MOD 45 MIN: CPT | Performed by: NEUROLOGICAL SURGERY

## 2024-12-11 NOTE — PROGRESS NOTES
Neurosurgery Clinic Visit  2024    Katelyn Gutierrez PCP:  Kenia Lamar MD    1955 MRN [id number]       CHIEF COMPLAINT:  Chief Complaint   Patient presents with    New Patient    Low Back Pain       HISTORY OF PRESENT ILLNESS:  Katelyn Gutierrez is a(n) 69 year old female today for neurosurgical consultation.  She has a primary concern of pain in her right hip and leg.  Symptoms been ongoing since last spring.  She has attended physical therapy, but has not yet seen pain management.    Symptoms are worse in the afternoon.  This is seriously limiting her ability to walk, hike, lift, and perform routine activities of daily living.    REVIEW OF SYSTEMS:  The 12 point checklist was reviewed and was negative except: As noted in HPI    ALLERGIES:  Allergies[1]    MEDICATIONS:  Current Outpatient Medications   Medication Sig Dispense Refill    L-methylfolate 15 MG Oral Tab Take 1 tablet (15 mg total) by mouth daily. 90 tablet 3    Evolocumab (REPATHA SURECLICK) 140 MG/ML Subcutaneous Solution Auto-injector Inject into the skin Every 2 (two) weeks.      losartan 25 MG Oral Tab Take 1 tablet (25 mg total) by mouth daily.      aspirin 81 MG Oral Tab EC aspirin 81 mg tablet,delayed release, [RxNorm: 842837]      prasugrel 10 MG Oral Tab Take 1 tablet (10 mg total) by mouth daily. 90 tablet 3    loteprednol 0.5 % Ophthalmic Suspension Place 1 drop into both eyes daily. 1% per pt         HISTORY:  Past Medical History:    Abnormal pelvic exam    Coronary atherosclerosis    Diabetes (HCC)    Fuchs' corneal dystrophy    High blood pressure    High cholesterol    Hypertension    Lyme disease    OSTEOARTHRITIS    Post-operative nausea and vomiting    pt states she often wakes up during surgery    PTSD (post-traumatic stress disorder)        Rotator cuff injury     Past Surgical History:   Procedure Laterality Date    Benign biopsy left Left     Carpal tunnel release      Cath drug eluting stent       Eye surgery      Foot surgery      Hand/finger surgery unlisted Right 10/19/20--Dr. Paulo Shay    ring finger A1 pulley release/trigger finger release    Jose localization wire 1 site left (cpt=19281)      1990's bn    Other  1994    Other accessory      dissected LAD, double stented 8/11/23    Other surgical history      A RCR RIGHT SHOULDER     Other surgical history      TRIGGER RELEASE RIGHT THUMB    Other surgical history      CTR BILATERAL      Other surgical history      NEUROMA REMOVED FOOT     Repair rotator cuff,acute       Family History   Problem Relation Age of Onset    Heart Disorder Father     Depression Sister     Kidney Disease Sister     Heart Disorder Maternal Grandfather     Heart Disorder Paternal Grandfather     Breast Cancer Paternal Aunt 70        Dx Breast CA age 70    Dementia Mother     Alcohol and Other Disorders Associated Mother     Bleeding Disorders Neg     Colon Cancer Neg     Ovarian Cancer Neg      Katelyn  reports that she quit smoking about 50 years ago. Her smoking use included cigarettes. She started smoking about 52 years ago. She has a 2 pack-year smoking history. She has never used smokeless tobacco. She reports current alcohol use. She reports that she does not use drugs.    PHYSICAL EXAMINATION:  Vital Signs:  /70 (BP Location: Left arm, Patient Position: Sitting, Cuff Size: large)   Pulse 80   Ht 63\"   Wt 180 lb (81.6 kg)   LMP  (LMP Unknown)   BMI 31.89 kg/m²   On examination, strength is 5/5 throughout.  Reflexes are 1+ and symmetric.  No Kailey's or clonus.    REVIEW OF STUDIES:  MRI scan of the lumbar spine was performed with and without contrast on November 19.  Images personally reviewed.  Grade 1 anterolisthesis at L4-5.  There is severe stenosis at this level.  There is calcification along the deep aspect of the lamina and the right facet joint, resulting in significant asymmetric stenosis biased to the right.  This may represent a calcified  synovial cyst.      ASSESSMENT AND PLAN:  1.  L4-5 spondylolisthesis    2.  Probable right calcified synovial cyst, L4-5    3.  Intractable neurogenic claudication with right sided radiculopathy    I reviewed the imaging with the patient, and we discussed available treatment options.  I have prescribed a CT scan and scoliosis x-rays.  I also made a referral to physiatry.    I think the patient is an excellent surgical candidate.  Pending sagittal balance parameters from the scoliosis x-rays, this will likely involve an L4-5 TLIF with central decompression and resection of the right sided dorsal calcification.    I would like to see her back in 2 weeks.      Time spent on counseling/coordination of care:  30 Minutes    Total Time spent with patient:  15 Minutes        12/11/2024  10:57 AM       [1]   Allergies  Allergen Reactions    Codeine UNKNOWN     Vomittingall pain medications  Vomittingall pain medications    Monosodium Glutamate SWELLING    Monosodium Glutamate SWELLING     swelling    Sulfa Antibiotics HIVES    Atorvastatin MYALGIA    Fentanyl NAUSEA AND VOMITING    Hydrocodone NAUSEA AND VOMITING    Hydrocodone-Acetaminophen NAUSEA AND VOMITING     vomittingall pain medications  vomittingall pain medications  vomittingall pain medications  vomittingall pain medications    Jardiance [Empagliflozin] DIZZINESS    Augmentin [Amoxicillin-Pot Clavulanate]      Myalgia  Ringing in ear    Propoxyphene UNKNOWN     Vomittingall pain medications  Vomittingall pain medications    Ezetimibe NAUSEA ONLY    Propoxyphene N-Apap NAUSEA ONLY     Most pain medications cause nausea

## 2024-12-11 NOTE — PROGRESS NOTES
New patient:  Reason for visit: lower back pain     Estimated time of onset:  since spring of 2024    Numeric Rating Scale:  Pain at Present:  5/10                                                                                                                       Distribution of Pain:    right states she has N/T in her left leg states she has weakness in her left leg     Past Treatments for Current Pain Condition:     States she had done PT in 11/2024  States she had a injection a year ago   States she had no sx on her back   Response to treatment: no relief    Prior diagnostic testing related to this condition:  MRI in chart

## 2024-12-11 NOTE — PATIENT INSTRUCTIONS
Refill policies:    Allow 2-3 business days for refills; controlled substances may take longer.  Contact your pharmacy at least 5 days prior to running out of medication and have them send an electronic request or submit request through the “request refill” option in your [facility] account.  Refills are not addressed on weekends; covering physicians do not authorize routine medications on weekends.  No narcotics or controlled substances are refilled after noon on Fridays or by on call physicians.  By law, narcotics must be electronically prescribed.  A 30 day supply with no refills is the maximum allowed.  If your prescription is due for a refill, you may be due for a follow up appointment.  To best provide you care, patients receiving routine medications need to be seen at least once a year.  Patients receiving narcotic/controlled substance medications need to be seen at least once every 3 months.  In the event that your preferred pharmacy does not have the requested medication in stock (e.g. Backordered), it is your responsibility to find another pharmacy that has the requested medication available.  We will gladly send a new prescription to that pharmacy at your request.    Scheduling Tests:    If your physician has ordered radiology tests such as MRI or CT scans, please contact Central Scheduling at [phone] right away to schedule the test.  Once scheduled, the [facility] Centralized Referral Team will work with your insurance carrier to obtain pre-certification or prior authorization.  Depending on your insurance carrier, approval may take 3-10 days.  It is highly recommended patients assure they have received an authorization before having a test performed.  If test is done without insurance authorization, patient may be responsible for the entire amount billed.      Precertification and Prior Authorizations:  If your physician has recommended that you have a procedure or additional testing performed the [facility]  Centralized Referral Team will contact your insurance carrier to obtain pre-certification or prior authorization.    You are strongly encouraged to contact your insurance carrier to verify that your procedure/test has been approved and is a COVERED benefit.  Although the [facility] Centralized Referral Team does its due diligence, the insurance carrier gives the disclaimer that \"Although the procedure is authorized, this does not guarantee payment.\"    Ultimately the patient is responsible for payment.   Thank you for your understanding in this matter.  Paperwork Completion:  If you require FMLA or disability paperwork for your recovery, please make sure to either drop it off or have it faxed to our office at [phone]. Be sure the form has your name and date of birth on it.  The form will be faxed to our Forms Department and they will complete it for you.  There is a 25$ fee for all forms that need to be filled out.  Please be aware there is a 10-14 day turnaround time.  You will need to sign a release of information (DESI) form if your paperwork does not come with one.  You may call the Forms Department with any questions at [phone].  Their fax number is [phone].

## 2024-12-12 ENCOUNTER — HOSPITAL ENCOUNTER (OUTPATIENT)
Dept: GENERAL RADIOLOGY | Facility: HOSPITAL | Age: 69
Discharge: HOME | End: 2024-12-12
Attending: NEUROLOGICAL SURGERY
Payer: MEDICARE

## 2024-12-12 ENCOUNTER — LAB ENCOUNTER (OUTPATIENT)
Dept: LAB | Age: 69
End: 2024-12-12
Attending: INTERNAL MEDICINE
Payer: MEDICARE

## 2024-12-12 DIAGNOSIS — Z11.1 SCREENING FOR TUBERCULOSIS: ICD-10-CM

## 2024-12-12 DIAGNOSIS — M43.16 SPONDYLOLISTHESIS OF LUMBAR REGION: ICD-10-CM

## 2024-12-12 DIAGNOSIS — Z11.59 NEED FOR HEPATITIS B SCREENING TEST: ICD-10-CM

## 2024-12-12 DIAGNOSIS — M35.3 PMR (POLYMYALGIA RHEUMATICA) (HCC): ICD-10-CM

## 2024-12-12 LAB
ALBUMIN SERPL-MCNC: 4 G/DL (ref 3.2–4.8)
ALBUMIN/GLOB SERPL: 1.3 {RATIO} (ref 1–2)
ALP LIVER SERPL-CCNC: 94 U/L
ALT SERPL-CCNC: 23 U/L
ANION GAP SERPL CALC-SCNC: 7 MMOL/L (ref 0–18)
AST SERPL-CCNC: 27 U/L (ref ?–34)
BASOPHILS # BLD AUTO: 0.02 X10(3) UL (ref 0–0.2)
BASOPHILS NFR BLD AUTO: 0.4 %
BILIRUB SERPL-MCNC: 0.6 MG/DL (ref 0.2–1.1)
BUN BLD-MCNC: 17 MG/DL (ref 9–23)
CALCIUM BLD-MCNC: 9.7 MG/DL (ref 8.7–10.4)
CHLORIDE SERPL-SCNC: 108 MMOL/L (ref 98–112)
CO2 SERPL-SCNC: 25 MMOL/L (ref 21–32)
CREAT BLD-MCNC: 0.92 MG/DL
CRP SERPL-MCNC: <0.4 MG/DL (ref ?–0.5)
EGFRCR SERPLBLD CKD-EPI 2021: 67 ML/MIN/1.73M2 (ref 60–?)
EOSINOPHIL # BLD AUTO: 0.2 X10(3) UL (ref 0–0.7)
EOSINOPHIL NFR BLD AUTO: 4.1 %
ERYTHROCYTE [DISTWIDTH] IN BLOOD BY AUTOMATED COUNT: 13.2 %
ERYTHROCYTE [SEDIMENTATION RATE] IN BLOOD: 22 MM/HR
FASTING STATUS PATIENT QL REPORTED: YES
GLOBULIN PLAS-MCNC: 3 G/DL (ref 2–3.5)
GLUCOSE BLD-MCNC: 139 MG/DL (ref 70–99)
HBV CORE AB SERPL QL IA: NONREACTIVE
HBV SURFACE AB SER QL: NONREACTIVE
HBV SURFACE AB SERPL IA-ACNC: <3.1 MIU/ML
HBV SURFACE AG SER-ACNC: <0.1 [IU]/L
HBV SURFACE AG SERPL QL IA: NONREACTIVE
HCT VFR BLD AUTO: 36.7 %
HGB BLD-MCNC: 12.2 G/DL
IMM GRANULOCYTES # BLD AUTO: 0.01 X10(3) UL (ref 0–1)
IMM GRANULOCYTES NFR BLD: 0.2 %
LYMPHOCYTES # BLD AUTO: 1.67 X10(3) UL (ref 1–4)
LYMPHOCYTES NFR BLD AUTO: 34.2 %
MAGNESIUM SERPL-MCNC: 2 MG/DL (ref 1.6–2.6)
MCH RBC QN AUTO: 31.5 PG (ref 26–34)
MCHC RBC AUTO-ENTMCNC: 33.2 G/DL (ref 31–37)
MCV RBC AUTO: 94.8 FL
MONOCYTES # BLD AUTO: 0.43 X10(3) UL (ref 0.1–1)
MONOCYTES NFR BLD AUTO: 8.8 %
NEUTROPHILS # BLD AUTO: 2.55 X10 (3) UL (ref 1.5–7.7)
NEUTROPHILS # BLD AUTO: 2.55 X10(3) UL (ref 1.5–7.7)
NEUTROPHILS NFR BLD AUTO: 52.3 %
OSMOLALITY SERPL CALC.SUM OF ELEC: 294 MOSM/KG (ref 275–295)
PHOSPHATE SERPL-MCNC: 3.6 MG/DL (ref 2.4–5.1)
PLATELET # BLD AUTO: 262 10(3)UL (ref 150–450)
POTASSIUM SERPL-SCNC: 4.1 MMOL/L (ref 3.5–5.1)
PROT SERPL-MCNC: 7 G/DL (ref 5.7–8.2)
PTH-INTACT SERPL-MCNC: 54.1 PG/ML (ref 18.5–88)
RBC # BLD AUTO: 3.87 X10(6)UL
SODIUM SERPL-SCNC: 140 MMOL/L (ref 136–145)
URATE SERPL-MCNC: 5.1 MG/DL
WBC # BLD AUTO: 4.9 X10(3) UL (ref 4–11)

## 2024-12-12 PROCEDURE — 86480 TB TEST CELL IMMUN MEASURE: CPT

## 2024-12-12 PROCEDURE — 83735 ASSAY OF MAGNESIUM: CPT

## 2024-12-12 PROCEDURE — 86706 HEP B SURFACE ANTIBODY: CPT

## 2024-12-12 PROCEDURE — 86431 RHEUMATOID FACTOR QUANT: CPT

## 2024-12-12 PROCEDURE — 86200 CCP ANTIBODY: CPT

## 2024-12-12 PROCEDURE — 84550 ASSAY OF BLOOD/URIC ACID: CPT

## 2024-12-12 PROCEDURE — 36415 COLL VENOUS BLD VENIPUNCTURE: CPT

## 2024-12-12 PROCEDURE — 83970 ASSAY OF PARATHORMONE: CPT

## 2024-12-12 PROCEDURE — 84100 ASSAY OF PHOSPHORUS: CPT

## 2024-12-12 PROCEDURE — 85025 COMPLETE CBC W/AUTO DIFF WBC: CPT

## 2024-12-12 PROCEDURE — 86140 C-REACTIVE PROTEIN: CPT

## 2024-12-12 PROCEDURE — 80053 COMPREHEN METABOLIC PANEL: CPT

## 2024-12-12 PROCEDURE — 72082 X-RAY EXAM ENTIRE SPI 2/3 VW: CPT | Performed by: NEUROLOGICAL SURGERY

## 2024-12-12 PROCEDURE — 87340 HEPATITIS B SURFACE AG IA: CPT

## 2024-12-12 PROCEDURE — 85652 RBC SED RATE AUTOMATED: CPT

## 2024-12-12 PROCEDURE — 86704 HEP B CORE ANTIBODY TOTAL: CPT

## 2024-12-13 ENCOUNTER — PATIENT MESSAGE (OUTPATIENT)
Dept: INTERNAL MEDICINE CLINIC | Facility: CLINIC | Age: 69
End: 2024-12-13

## 2024-12-13 LAB
CCP IGG SERPL-ACNC: 1 U/ML (ref 0–6.9)
RHEUMATOID FACT SERPL-ACNC: 5.1 IU/ML (ref ?–14)

## 2024-12-13 NOTE — TELEPHONE ENCOUNTER
Last visit 10/23/24      2/5/2025 10:00 AM Alexandria Hassan MD [facility] [address]   3/12/2025 10:20 AM Alexandria Hassan MD [facility] [address]

## 2024-12-13 NOTE — TELEPHONE ENCOUNTER
Patient LVM to call her back regarding this message.  I called her back to discuss.  She had a recent CMP per rheumatology and her glucose was 139.  She feels she needs medicine and wants to resume the metformin since it helped and she feels she needs to lower the glucose again. I asked her why she would want to resume metformin since she felt it caused shortness of breath and pre-syncope?  She said when she stopped metformin, she felt better within 2 days.   I advised her to stay off metformin and I would relay her concerns to the provider.    She was not fasting for the lab.  There was no new A1c done - the previous was 5.9% on 9/5/24.  I explained that we would not go by the non-fasting CMP to say that she needs treatment for her glucose and  Would likely not want her to return to taking metformin if she felt sick with it.    She would like me to send Dr. Hassan all the information she sent back to us and see if she had other recommendations for her glucose/weight.    She is also going to need spinal surgery sometime in January and wanted you to be aware.  She is unable to be active at this time to help with weight loss d/t the pain.

## 2024-12-14 ENCOUNTER — HOSPITAL ENCOUNTER (OUTPATIENT)
Dept: CT IMAGING | Facility: HOSPITAL | Age: 69
Discharge: HOME | End: 2024-12-14
Attending: NEUROLOGICAL SURGERY
Payer: MEDICARE

## 2024-12-14 DIAGNOSIS — M43.16 SPONDYLOLISTHESIS OF LUMBAR REGION: ICD-10-CM

## 2024-12-14 LAB
M TB IFN-G CD4+ T-CELLS BLD-ACNC: 0 IU/ML
M TB TUBERC IFN-G BLD QL: NEGATIVE
M TB TUBERC IGNF/MITOGEN IGNF CONTROL: >10 IU/ML
QFT TB1 AG MINUS NIL: 0 IU/ML
QFT TB2 AG MINUS NIL: 0.01 IU/ML

## 2024-12-14 PROCEDURE — 72131 CT LUMBAR SPINE W/O DYE: CPT | Performed by: NEUROLOGICAL SURGERY

## 2024-12-16 ENCOUNTER — APPOINTMENT (OUTPATIENT)
Dept: PHYSICAL THERAPY | Age: 69
End: 2024-12-16
Attending: STUDENT IN AN ORGANIZED HEALTH CARE EDUCATION/TRAINING PROGRAM
Payer: MEDICARE

## 2024-12-17 ENCOUNTER — TELEPHONE (OUTPATIENT)
Dept: RHEUMATOLOGY | Facility: CLINIC | Age: 69
End: 2024-12-17

## 2024-12-17 NOTE — PATIENT INSTRUCTIONS
Refill policies:    Allow 2-3 business days for refills; controlled substances may take longer.  Contact your pharmacy at least 5 days prior to running out of medication and have them send an electronic request or submit request through the “request refill” option in your [employer] account.  Refills are not addressed on weekends; covering physicians do not authorize routine medications on weekends.  No narcotics or controlled substances are refilled after noon on Fridays or by on call physicians.  By law, narcotics must be electronically prescribed.  A 30 day supply with no refills is the maximum allowed.  If your prescription is due for a refill, you may be due for a follow up appointment.  To best provide you care, patients receiving routine medications need to be seen at least once a year.  Patients receiving narcotic/controlled substance medications need to be seen at least once every 3 months.  In the event that your preferred pharmacy does not have the requested medication in stock (e.g. Backordered), it is your responsibility to find another pharmacy that has the requested medication available.  We will gladly send a new prescription to that pharmacy at your request.    Scheduling Tests:    If your physician has ordered radiology tests such as MRI or CT scans, please contact Central Scheduling at [phone] right away to schedule the test.  Once scheduled, the [facility] Centralized Referral Team will work with your insurance carrier to obtain pre-certification or prior authorization.  Depending on your insurance carrier, approval may take 3-10 days.  It is highly recommended patients assure they have received an authorization before having a test performed.  If test is done without insurance authorization, patient may be responsible for the entire amount billed.      Precertification and Prior Authorizations:  If your physician has recommended that you have a procedure or additional testing performed the [facility]  Centralized Referral Team will contact your insurance carrier to obtain pre-certification or prior authorization.    You are strongly encouraged to contact your insurance carrier to verify that your procedure/test has been approved and is a COVERED benefit.  Although the [facility] Centralized Referral Team does its due diligence, the insurance carrier gives the disclaimer that \"Although the procedure is authorized, this does not guarantee payment.\"    Ultimately the patient is responsible for payment.   Thank you for your understanding in this matter.  Paperwork Completion:  If you require FMLA or disability paperwork for your recovery, please make sure to either drop it off or have it faxed to our office at [phone]. Be sure the form has your name and date of birth on it.  The form will be faxed to our Forms Department and they will complete it for you.  There is a 25$ fee for all forms that need to be filled out.  Please be aware there is a 10-14 day turnaround time.  You will need to sign a release of information (DESI) form if your paperwork does not come with one.  You may call the Forms Department with any questions at [phone].  Their fax number is [phone].

## 2024-12-17 NOTE — TELEPHONE ENCOUNTER
Her symptoms and evidence of presyncope.  I would recommend holding off on metformin until a follow-up visit and further evaluation done.  Discussed repeating an A1c at her next office visit right now it would not be incredibly helpful because she has been intermittently on the metformin so if her numbers improve it may be because of the metformin versus just natural improvement with her weight loss.  Thus I would recommend repeating A1c 3 months once she is off metformin therapy

## 2024-12-18 ENCOUNTER — OFFICE VISIT (OUTPATIENT)
Dept: SURGERY | Facility: CLINIC | Age: 69
End: 2024-12-18
Payer: MEDICARE

## 2024-12-18 ENCOUNTER — TELEPHONE (OUTPATIENT)
Dept: SURGERY | Facility: CLINIC | Age: 69
End: 2024-12-18

## 2024-12-18 ENCOUNTER — APPOINTMENT (OUTPATIENT)
Dept: PHYSICAL THERAPY | Age: 69
End: 2024-12-18
Attending: STUDENT IN AN ORGANIZED HEALTH CARE EDUCATION/TRAINING PROGRAM
Payer: MEDICARE

## 2024-12-18 VITALS
BODY MASS INDEX: 31.89 KG/M2 | HEIGHT: 63 IN | WEIGHT: 180 LBS | SYSTOLIC BLOOD PRESSURE: 118 MMHG | DIASTOLIC BLOOD PRESSURE: 78 MMHG | HEART RATE: 52 BPM

## 2024-12-18 DIAGNOSIS — M43.16 SPONDYLOLISTHESIS OF LUMBAR REGION: Primary | ICD-10-CM

## 2024-12-18 PROCEDURE — 99214 OFFICE O/P EST MOD 30 MIN: CPT | Performed by: NEUROLOGICAL SURGERY

## 2024-12-18 NOTE — TELEPHONE ENCOUNTER
Patient to reach out next week to advise on what decision she will make regarding scheduling surgery.
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
## 2024-12-19 ENCOUNTER — OFFICE VISIT (OUTPATIENT)
Dept: PHYSICAL MEDICINE AND REHAB | Facility: CLINIC | Age: 69
End: 2024-12-19
Payer: MEDICARE

## 2024-12-19 VITALS — HEIGHT: 63 IN | BODY MASS INDEX: 31.89 KG/M2 | WEIGHT: 180 LBS

## 2024-12-19 DIAGNOSIS — M48.062 SPINAL STENOSIS OF LUMBAR REGION WITH NEUROGENIC CLAUDICATION: ICD-10-CM

## 2024-12-19 DIAGNOSIS — M35.3 PMR (POLYMYALGIA RHEUMATICA) (HCC): Primary | ICD-10-CM

## 2024-12-19 DIAGNOSIS — M85.89 OSTEOPENIA OF MULTIPLE SITES: ICD-10-CM

## 2024-12-19 DIAGNOSIS — I25.10 CORONARY ARTERY DISEASE INVOLVING NATIVE CORONARY ARTERY OF NATIVE HEART WITHOUT ANGINA PECTORIS: ICD-10-CM

## 2024-12-19 DIAGNOSIS — Z79.01 ANTICOAGULANT LONG-TERM USE: ICD-10-CM

## 2024-12-19 PROCEDURE — 99204 OFFICE O/P NEW MOD 45 MIN: CPT | Performed by: PHYSICAL MEDICINE & REHABILITATION

## 2024-12-19 RX ORDER — GABAPENTIN 300 MG/1
300 CAPSULE ORAL NIGHTLY
Qty: 30 CAPSULE | Refills: 0 | Status: SHIPPED | OUTPATIENT
Start: 2024-12-19 | End: 2025-01-18

## 2024-12-19 NOTE — PROGRESS NOTES
[facility]  Progress Note    CHIEF COMPLAINT:    Chief Complaint   Patient presents with    Low Back Pain     New patient ref'd for BL low back pain. Lumbar MRI 11/19/24, lumbar CT 12/14/24. Pain is a constant ache, N/T in BL legs and feet if walking long periods. Uses heat PRN with relief, did some PT without relief. Received injection last year, doesn't know where but states it didn't help. LOP 7/10       History of Present Illness:  Katelyn Gutierrez is a 69 year old female who is being seen in consultation at the request of Dr. Givens. She has a chief complaint of bilateral low back pain that radiates down both legs exacerbated by ambulation.  She has seen Dr. Givens and has discussed a two-stage surgical procedure involving decompression and fusion anteriorly and posteriorly.  She is considering surgical intervention in early 2025 but in the meantime seeks relief for claudication symptoms.    PAST MEDICAL HISTORY:  Past Medical History:    Abnormal pelvic exam    Coronary atherosclerosis    Diabetes (HCC)    Fuchs' corneal dystrophy    High blood pressure    High cholesterol    Hyperlipidemia    Hypertension    Lyme disease    OSTEOARTHRITIS    Post-operative nausea and vomiting    pt states she often wakes up during surgery    PTSD (post-traumatic stress disorder)    9/11    Rotator cuff injury       SURGICAL HISTORY:  Past Surgical History:   Procedure Laterality Date    Benign biopsy left Left 1993    Carpal tunnel release      Cath drug eluting stent      Eye surgery      Foot surgery      Hand/finger surgery unlisted Right 10/19/20--Dr. Paulo Shay    ring finger A1 pulley release/trigger finger release    Jose localization wire 1 site left (cpt=19281)      1990's bn    Other  1994    Other accessory      dissected LAD, double stented 8/11/23    Other surgical history      A RCR RIGHT SHOULDER     Other surgical history      TRIGGER RELEASE RIGHT THUMB     Other surgical history      CTR BILATERAL      Other surgical history      NEUROMA REMOVED FOOT     Repair rotator cuff,acute         SOCIAL HISTORY:   Social History     Occupational History    Occupation: business      Comment: [employer]    Occupation: unemployed now   Tobacco Use    Smoking status: Former     Current packs/day: 0.00     Average packs/day: 1 pack/day for 2.0 years (2.0 ttl pk-yrs)     Types: Cigarettes     Start date: 1972     Quit date: 1974     Years since quittin.6    Smokeless tobacco: Never    Tobacco comments:     Started slowly at age 18 and quit at age 20.   Vaping Use    Vaping status: Never Used   Substance and Sexual Activity    Alcohol use: Not Currently     Comment: 1 glass of wine at holiday dinner    Drug use: No    Sexual activity: Yes     Partners: Female, Male     Comment: patient has one child        CURRENT MEDICATIONS:   Current Outpatient Medications   Medication Sig Dispense Refill    gabapentin 300 MG Oral Cap Take 1 capsule (300 mg total) by mouth nightly. 30 capsule 0    L-methylfolate 15 MG Oral Tab Take 1 tablet (15 mg total) by mouth daily. 90 tablet 3    Evolocumab (REPATHA SURECLICK) 140 MG/ML Subcutaneous Solution Auto-injector Inject into the skin Every 2 (two) weeks.      losartan 25 MG Oral Tab Take 1 tablet (25 mg total) by mouth daily.      aspirin 81 MG Oral Tab EC aspirin 81 mg tablet,delayed release, [RxNorm: 459893]      prasugrel 10 MG Oral Tab Take 1 tablet (10 mg total) by mouth daily. 90 tablet 3    loteprednol 0.5 % Ophthalmic Suspension Place 1 drop into both eyes daily. 1% per pt         ALLERGIES:   Allergies[1]              PHYSICAL EXAM:   Ht 63\"   Wt 180 lb (81.6 kg)   LMP  (LMP Unknown)   BMI 31.89 kg/m²     Body mass index is 31.89 kg/m².      General: No immediate distress  Head: Normocephalic/ Atraumatic  Extremities: No lower extremity edema bilaterally. Peripheral pulses intact.   Spine:  Limited lumbar extension  Hips: full and painfree ROM   Neuro:   Cognition: alert & oriented x 3, attentive, able to follow 2 step commands, comprehention intact, spontaneous speech intact  Strength: Lower extremities have 5/5 strength  Sensation: Normal lower extremities  Reflexes: Areflexic lower extremities  SLR: neg    Data    Radiology Imaging:  I personally reviewed a lumbar MRI from November 2024 showing grade 1 L4-5 spondylolisthesis with moderate to severe central canal stenosis at that level.  There is also widespread facet arthropathy.      ASSESSMENT AND PLAN:  1. Spinal stenosis of lumbar region with neurogenic claudication  I think the patient would benefit from a lumbar epidural injection.  We will set her up for bilateral L5 transforaminal injections and she will return 2 weeks that.  - Lumbar Transforaminal Epidural Injection (TFESI); Future    2. PMR (polymyalgia rheumatica) (HCC)  Complicates painful conditions    3. Anticoagulant long-term use  Will need to hold anticoagulants for the epidural.  No NSAIDs, limits treatment options.    4. Coronary artery disease involving native coronary artery of native heart without angina pectoris  No NSAIDs    5. Osteopenia of multiple sites  Caution with steroids             The patient was in agreement with the assessment and plan.  All questions were answered.        Wilmer Del Castillo MD  Physical Medicine and Rehabilitation/Sports Medicine  [facility]         [1]   Allergies  Allergen Reactions    Codeine UNKNOWN     Vomittingall pain medications  Vomittingall pain medications    Monosodium Glutamate SWELLING    Monosodium Glutamate SWELLING     swelling    Sulfa Antibiotics HIVES    Atorvastatin MYALGIA    Fentanyl NAUSEA AND VOMITING    Hydrocodone NAUSEA AND VOMITING    Hydrocodone-Acetaminophen NAUSEA AND VOMITING     vomittingall pain medications  vomittingall pain medications  vomittingall pain medications  vomittingall pain  medications    Jardiance [Empagliflozin] DIZZINESS    Augmentin [Amoxicillin-Pot Clavulanate]      Myalgia  Ringing in ear    Propoxyphene UNKNOWN     Vomittingall pain medications  Vomittingall pain medications    Ezetimibe NAUSEA ONLY    Propoxyphene N-Apap NAUSEA ONLY     Most pain medications cause nausea

## 2024-12-19 NOTE — TELEPHONE ENCOUNTER
MCM sent to pt relaying provider message as stated below. Awaiting possible response. Signing encounter with no further action required from office at this time.

## 2024-12-19 NOTE — TELEPHONE ENCOUNTER
Let her know that I did receive the CD. Thanks very much to the patient. Will review the CD on Friday with some other imaging. The process to upload drives and view images CD takes a while actually. Will get back to her then.

## 2024-12-19 NOTE — TELEPHONE ENCOUNTER
Message below noted.    Patient acknowledged and appreciative of message.     Nothing needed further with this encounter.

## 2024-12-20 ENCOUNTER — TELEPHONE (OUTPATIENT)
Dept: PHYSICAL MEDICINE AND REHAB | Facility: CLINIC | Age: 69
End: 2024-12-20

## 2024-12-20 DIAGNOSIS — M47.816 LUMBAR SPONDYLOSIS: Primary | ICD-10-CM

## 2024-12-20 NOTE — TELEPHONE ENCOUNTER
Per CMS Guidelines -no authorization is required for Bilateral L5 TFESI under fluoroscopic guidance  CPT code: 07701-29       Status: Authorization is not required based on medical necessity however is not a guarantee of payment and may be subject to review once claim is submitted-Covered Benefit.  This will be #1 in a rolling 12 month period

## 2024-12-23 NOTE — TELEPHONE ENCOUNTER
Status of Anticoag  [x] Clearance pending to hold Brillinta for 7 days prior to bilateral L5 transforaminal epidural steroid injections F: [phone]  [] Clearance approved  [] Clearance denied

## 2024-12-26 ENCOUNTER — HOSPITAL ENCOUNTER (OUTPATIENT)
Dept: CT IMAGING | Facility: HOSPITAL | Age: 69
Discharge: HOME | End: 2024-12-26
Attending: INTERNAL MEDICINE
Payer: MEDICARE

## 2024-12-26 ENCOUNTER — TELEPHONE (OUTPATIENT)
Dept: INTEGRATIVE MEDICINE | Facility: CLINIC | Age: 69
End: 2024-12-26

## 2024-12-26 DIAGNOSIS — M1A.9XX1 TOPHUS OF RIGHT HAND DUE TO GOUT: ICD-10-CM

## 2024-12-26 PROCEDURE — 73200 CT UPPER EXTREMITY W/O DYE: CPT | Performed by: INTERNAL MEDICINE

## 2024-12-26 NOTE — TELEPHONE ENCOUNTER
RN received voicemail from patient. Patient would like advice from RADHA Schaeffer regarding her upcoming surgery.     Patient is having \"double spine surgery\" in February. Patient would like to discuss how to prepare and recover from surgery with RADHA Diaz and how to manage pain/pain medications.

## 2024-12-27 PROBLEM — M48.062 SPINAL STENOSIS OF LUMBAR REGION WITH NEUROGENIC CLAUDICATION: Status: ACTIVE | Noted: 2024-12-27

## 2025-01-02 ENCOUNTER — TELEPHONE (OUTPATIENT)
Dept: RHEUMATOLOGY | Facility: CLINIC | Age: 70
End: 2025-01-02

## 2025-01-02 ENCOUNTER — TELEMEDICINE (OUTPATIENT)
Dept: RHEUMATOLOGY | Facility: CLINIC | Age: 70
End: 2025-01-02
Payer: MEDICARE

## 2025-01-02 DIAGNOSIS — M10.9 GOUT, UNSPECIFIED CAUSE, UNSPECIFIED CHRONICITY, UNSPECIFIED SITE: ICD-10-CM

## 2025-01-02 DIAGNOSIS — M35.3 PMR (POLYMYALGIA RHEUMATICA) (HCC): ICD-10-CM

## 2025-01-02 DIAGNOSIS — Z51.81 THERAPEUTIC DRUG MONITORING: ICD-10-CM

## 2025-01-02 DIAGNOSIS — M10.9 GOUT, UNSPECIFIED CAUSE, UNSPECIFIED CHRONICITY, UNSPECIFIED SITE: Primary | ICD-10-CM

## 2025-01-02 DIAGNOSIS — M1A.9XX1 TOPHUS OF RIGHT HAND DUE TO GOUT: Primary | ICD-10-CM

## 2025-01-02 PROCEDURE — 99214 OFFICE O/P EST MOD 30 MIN: CPT | Performed by: INTERNAL MEDICINE

## 2025-01-02 PROCEDURE — G2211 COMPLEX E/M VISIT ADD ON: HCPCS | Performed by: INTERNAL MEDICINE

## 2025-01-02 RX ORDER — ALLOPURINOL 100 MG/1
TABLET ORAL
Qty: 195 TABLET | Refills: 1 | Status: SHIPPED | OUTPATIENT
Start: 2025-01-02 | End: 2025-04-02

## 2025-01-02 RX ORDER — COLCHICINE 0.6 MG/1
0.6 TABLET ORAL DAILY
Qty: 90 TABLET | Refills: 1 | Status: SHIPPED | OUTPATIENT
Start: 2025-01-02

## 2025-01-02 RX ORDER — PREDNISONE 5 MG/1
TABLET ORAL
Qty: 19 TABLET | Refills: 2 | Status: SHIPPED | OUTPATIENT
Start: 2025-01-02 | End: 2025-01-02

## 2025-01-02 RX ORDER — PREDNISONE 5 MG/1
TABLET ORAL
Qty: 19 TABLET | Refills: 2 | Status: SHIPPED | OUTPATIENT
Start: 2025-01-02 | End: 2025-01-12

## 2025-01-02 NOTE — PATIENT INSTRUCTIONS
Allopurinol uptitration (This will cure gout):  1 tablet (100 mg total) daily for 30 days  THEN 2 tablets (200 mg total) daily for 30 days  THEN 3 tablets (300 mg total) daily. After 30 days of being on this dose, repeat bloodwork. Including a comprehensive metabolic panel (CMP) and uric acid     Colchicine (to prevent gout flares while starting on allopurinol). Generally, you will need to be on colchicine for 6-12 months, when the risk of gout flares is highest during allopurinol initiation.  Colchicine 0.6 mg tab: take 1 tab daily.  This may be expensive, if it is then this is the back-up  --Check out the following pharmacy.  It may save you some money.  [facility]. Phone: [phone]  [email or web address]     Prednisone taper given (only as needed for gout flares):  take this today. Patient cannot tolerate high doses of prednisone  THEN 3 tabs (15 mg) daily for 3 day  THEN 2 tabs (10 mg ) daily for 3 days  THEN 1 tablet (5 mg total) daily for 3 days.

## 2025-01-02 NOTE — TELEPHONE ENCOUNTER
Lab Results   Component Value Date    URIC 5.1 12/12/2024     Allopurinol uptitration (This will cure gout):  1 tablet (100 mg total) daily for 30 days  THEN 2 tablets (200 mg total) daily for 30 days  THEN 3 tablets (300 mg total) daily. After 30 days of being on this dose, repeat bloodwork. Including a comprehensive metabolic panel (CMP) and uric acid    Colchicine (to prevent gout flares while starting on allopurinol). Generally, you will need to be on colchicine for 6-12 months, when the risk of gout flares is highest during allopurinol initiation.  Colchicine 0.6 mg tab: take 1 tab daily.  This may be expensive, if it is then this is the back-up  --Check out the following pharmacy.  It may save you some money.  [facility]. Phone: [phone]  [email or web address]      Prednisone taper given (only as needed for gout flares):  take this today  THEN 3 tabs (15 mg) daily for 3 day  THEN 2 tabs (10 mg ) daily for 3 days  THEN 1 tablet (5 mg total) daily for 3 days.
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
## 2025-01-09 PROBLEM — M48.062 LUMBAR STENOSIS WITH NEUROGENIC CLAUDICATION: Status: ACTIVE | Noted: 2024-12-27

## 2025-01-21 ENCOUNTER — OFFICE VISIT (OUTPATIENT)
Dept: INTERNAL MEDICINE CLINIC | Facility: CLINIC | Age: 70
End: 2025-01-21
Payer: MEDICARE

## 2025-01-21 VITALS — WEIGHT: 180 LBS | BODY MASS INDEX: 32 KG/M2

## 2025-01-21 DIAGNOSIS — E66.811 OBESITY (BMI 30.0-34.9): Primary | ICD-10-CM

## 2025-01-21 NOTE — PROGRESS NOTES
FOLLOW UP NUTRITION CONSULTATION  Nutrition Assessment    Nutrition related diagnoses:Obesity, Gout, dissected LAD, prediabetes, HTN    Number of consultations with dietitian: 5    Height:  Ht Readings from Last 1 Encounters:   01/03/25 5' 3\" (1.6 m)       Weight:   Wt Readings from Last 2 Encounters:   01/21/25 180 lb (81.6 kg)   01/03/25 180 lb (81.6 kg)       BMI:  BMI Readings from Last 1 Encounters:   01/21/25 31.89 kg/m²       Weight change: No change of  in the past 2 months    Current weight loss medications: none at this time      Current Diet/Changes in Eating Habits: Increased eating out, less structure to meals due to family stressors      Diet/Lifestyle Modifications Following Previous Nutrition Consultation      Food journal: no    Physical activity: No structured activity.  Was more active while caring for mother in law for 1-2 weeks    Spent 14 minutes in consultation with the patient.    Assessment:   Pt with no net weight gain.  Diet deteriorated due to stressors including more fast food, imbalanced meals and snacking. Pt recently diagnosed with gout in her hands.    Advised:  Discussed getting back on track now that family issues have settled down.  Getting back to regular meals, primarily prepared at home was reviewed. Following a low purine diet for gout was recommended.    Assisted :  Provided written materials about foods to eat and what to avoid with gout.  Reviewed materials and discussed protein options on a low purine diet.  Pt is to have spinal fusion.  Preparing meals to freeze and have while recuperating ws suggested.    Agreed:  Pt agreed to return to structured meals and start a low purine diet.  Also agreed to [employer] in advance of surgery.    Arranged:   Pt to have surgery ~ March.  Encouraged calling to schedule follow up after able to drive and walk.    Westley Blanchard MS, RD, LDN

## 2025-01-31 ENCOUNTER — OFFICE VISIT (OUTPATIENT)
Dept: INTEGRATIVE MEDICINE | Facility: CLINIC | Age: 70
End: 2025-01-31
Payer: MEDICARE

## 2025-01-31 VITALS — HEART RATE: 71 BPM | DIASTOLIC BLOOD PRESSURE: 60 MMHG | SYSTOLIC BLOOD PRESSURE: 110 MMHG | OXYGEN SATURATION: 97 %

## 2025-01-31 DIAGNOSIS — M1A.9XX1 CHRONIC GOUT WITH TOPHUS, UNSPECIFIED CAUSE, UNSPECIFIED SITE: ICD-10-CM

## 2025-01-31 DIAGNOSIS — R55 SYNCOPE, UNSPECIFIED SYNCOPE TYPE: ICD-10-CM

## 2025-01-31 DIAGNOSIS — L65.9 HAIR LOSS: Primary | ICD-10-CM

## 2025-01-31 DIAGNOSIS — F43.10 PTSD (POST-TRAUMATIC STRESS DISORDER): ICD-10-CM

## 2025-01-31 DIAGNOSIS — U09.9 COVID-19 LONG HAULER: ICD-10-CM

## 2025-01-31 PROCEDURE — 99214 OFFICE O/P EST MOD 30 MIN: CPT | Performed by: PHYSICIAN ASSISTANT
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
## 2025-01-31 NOTE — PATIENT INSTRUCTIONS
Plan  1) Stay Probiotic 50billion CFU     2) Springer 2 calm - 1 twice a day when having a flare   One once a day and try to decrease to only as needed   Take as needed   3) Restart omega - monitor for syncope   DHA goal 500-1000mg/day  4)  histdao - only with high hystamine   5) continue electrolyte and aminos body fit   6) Gi repair powder 3 times a week and can decrease further   7) She is doing 2 mitocore   9) Orthomune 2 twice a day   Vit C helps excrete the uric acid  Quercetin - anti inflammatory and anti oxidant

## 2025-02-07 ENCOUNTER — OFFICE VISIT (OUTPATIENT)
Dept: PHYSICAL MEDICINE AND REHAB | Facility: CLINIC | Age: 70
End: 2025-02-07
Payer: MEDICARE

## 2025-02-07 VITALS — WEIGHT: 180 LBS | BODY MASS INDEX: 31.89 KG/M2 | HEIGHT: 63 IN

## 2025-02-07 DIAGNOSIS — M85.89 OSTEOPENIA OF MULTIPLE SITES: ICD-10-CM

## 2025-02-07 DIAGNOSIS — I25.10 CORONARY ARTERY DISEASE INVOLVING NATIVE CORONARY ARTERY OF NATIVE HEART WITHOUT ANGINA PECTORIS: ICD-10-CM

## 2025-02-07 DIAGNOSIS — Z79.01 ANTICOAGULANT LONG-TERM USE: ICD-10-CM

## 2025-02-07 DIAGNOSIS — M48.062 SPINAL STENOSIS OF LUMBAR REGION WITH NEUROGENIC CLAUDICATION: Primary | ICD-10-CM

## 2025-02-07 PROCEDURE — 99213 OFFICE O/P EST LOW 20 MIN: CPT | Performed by: PHYSICAL MEDICINE & REHABILITATION

## 2025-02-07 NOTE — PROGRESS NOTES
[facility]  Progress Note    CHIEF COMPLAINT:    Chief Complaint   Patient presents with    Follow - Up     LOV 12/19/24 patient following up on BL L5 TFESI done 1/9/25. Admits to 100% relief from this, is starting to wear off but is still helpful. Denies new sx. LOP 0/10       History of Present Illness:  Katelyn Gutierrez is a 69 year old female who presents today for follow up for symptoms of lumbar stenosis.  I last saw her in mid December for bilateral L5 transforaminal epidurals.  She has had significant relief from these.  She is still seeing Dr. Givens and is considering lumbar surgery.  Right now she has no pain.  She is also seeking other surgical opinions.    PAST MEDICAL HISTORY:  Past Medical History:    Abnormal pelvic exam    Coronary atherosclerosis    Diabetes (HCC)    Fuchs' corneal dystrophy    High blood pressure    High cholesterol    Hyperlipidemia    Hypertension    Lyme disease    OSTEOARTHRITIS    Post-operative nausea and vomiting    pt states she often wakes up during surgery    PTSD (post-traumatic stress disorder)    9/11    Rotator cuff injury       SURGICAL HISTORY:  Past Surgical History:   Procedure Laterality Date    Benign biopsy left Left 1993    Carpal tunnel release      Cath drug eluting stent      Eye surgery      Foot surgery      Hand/finger surgery unlisted Right 10/19/20--Dr. Paulo Shay    ring finger A1 pulley release/trigger finger release    Jose localization wire 1 site left (cpt=19281)      1990's bn    Other  1994    Other accessory      dissected LAD, double stented 8/11/23    Other surgical history      A RCR RIGHT SHOULDER     Other surgical history      TRIGGER RELEASE RIGHT THUMB    Other surgical history      CTR BILATERAL      Other surgical history      NEUROMA REMOVED FOOT     Repair rotator cuff,acute         SOCIAL HISTORY:   Social History     Occupational History    Occupation: business technical  consultant     Comment: [employer]    Occupation: unemployed now   Tobacco Use    Smoking status: Former     Current packs/day: 0.00     Average packs/day: 1 pack/day for 2.0 years (2.0 ttl pk-yrs)     Types: Cigarettes     Start date: 1972     Quit date: 1974     Years since quittin.7    Smokeless tobacco: Never    Tobacco comments:     Started slowly at age 18 and quit at age 20.   Vaping Use    Vaping status: Never Used   Substance and Sexual Activity    Alcohol use: Not Currently     Comment: 1 glass of wine at holiday dinner    Drug use: Never    Sexual activity: Yes     Partners: Female, Male     Comment: patient has one child        CURRENT MEDICATIONS:   Current Outpatient Medications   Medication Sig Dispense Refill    colchicine 0.6 MG Oral Tab Take 1 tablet (0.6 mg total) by mouth daily. 90 tablet 1    allopurinol 100 MG Oral Tab Take 1 tablet (100 mg total) by mouth daily for 30 days, THEN 2 tablets (200 mg total) daily for 30 days, THEN 3 tablets (300 mg total) daily. 195 tablet 1    L-methylfolate 15 MG Oral Tab Take 1 tablet (15 mg total) by mouth daily. 90 tablet 3    Evolocumab (REPATHA SURECLICK) 140 MG/ML Subcutaneous Solution Auto-injector Inject into the skin Every 2 (two) weeks.      losartan 25 MG Oral Tab Take 1 tablet (25 mg total) by mouth daily.      aspirin 81 MG Oral Tab EC aspirin 81 mg tablet,delayed release, [RxNorm: 392035]      prasugrel 10 MG Oral Tab Take 1 tablet (10 mg total) by mouth daily. 90 tablet 3    loteprednol 0.5 % Ophthalmic Suspension Place 1 drop into both eyes daily. 1% per pt         ALLERGIES:   Allergies[1]        PHYSICAL EXAM:   Ht 63\"   Wt 180 lb (81.6 kg)   LMP  (LMP Unknown)   BMI 31.89 kg/m²     Body mass index is 31.89 kg/m².      General: No immediate distress  Extremities: No lower extremity edema bilaterally   Spine: full and painfree lumbar ROM in all directions  Neuro:   Cognition: alert & oriented x 3, attentive, able to  follow 2 step commands, comprehention intact, spontaneous speech intact  Strength: Lower extremities have 5/5 strength          Data    Radiology Imaging:  I reviewed a lumbar MRI from November 2024 showing grade 1 L4-5 spondylolisthesis with moderate to severe central canal stenosis at that level. There is also widespread facet arthropathy.       ASSESSMENT AND PLAN:  1. Spinal stenosis of lumbar region with neurogenic claudication  Symptoms mostly abolished.  She is fully functional.  She is seeking surgical opinions.  She will return if symptoms worsen and if I can help her with another injection.    2. Anticoagulant long-term use  No NSAIDs.  Complicates administration of spinal injections    3. Coronary artery disease involving native coronary artery of native heart without angina pectoris  No NSAIDs    4. Osteopenia of multiple sites  Caution with steroids        RTC as needed      The patient was in agreement with the assessment and plan.  All questions were answered.        Wilmer Del Castillo MD  Physical Medicine and Rehabilitation/Sports Medicine  [facility]           [1]   Allergies  Allergen Reactions    Codeine UNKNOWN     Vomittingall pain medications  Vomittingall pain medications    Monosodium Glutamate SWELLING    Monosodium Glutamate SWELLING     swelling    Statins MYALGIA    Sulfa Antibiotics HIVES    Atorvastatin MYALGIA    Fentanyl NAUSEA AND VOMITING    Hydrocodone NAUSEA AND VOMITING    Hydrocodone-Acetaminophen NAUSEA AND VOMITING     vomittingall pain medications  vomittingall pain medications  vomittingall pain medications  vomittingall pain medications    Jardiance [Empagliflozin] DIZZINESS    Augmentin [Amoxicillin-Pot Clavulanate]      Myalgia  Ringing in ear    Ezetimibe NAUSEA ONLY    Propoxyphene UNKNOWN and NAUSEA ONLY     Vomittingall pain medications    Most pain medications cause nausea    Propoxyphene N-Apap NAUSEA ONLY     Most pain medications cause nausea

## 2025-02-20 ENCOUNTER — LAB ENCOUNTER (OUTPATIENT)
Dept: LAB | Age: 70
End: 2025-02-20
Attending: INTERNAL MEDICINE
Payer: MEDICARE

## 2025-02-20 DIAGNOSIS — E78.00 HYPERCHOLESTEROLEMIA: ICD-10-CM

## 2025-02-20 LAB
ALT SERPL-CCNC: 33 U/L
AST SERPL-CCNC: 37 U/L (ref ?–34)
CHOLEST SERPL-MCNC: 241 MG/DL (ref ?–200)
FASTING PATIENT LIPID ANSWER: YES
HDLC SERPL-MCNC: 66 MG/DL (ref 40–59)
LDLC SERPL CALC-MCNC: 148 MG/DL (ref ?–100)
NONHDLC SERPL-MCNC: 175 MG/DL (ref ?–130)
TRIGL SERPL-MCNC: 154 MG/DL (ref 30–149)
VLDLC SERPL CALC-MCNC: 29 MG/DL (ref 0–30)

## 2025-02-20 PROCEDURE — 80061 LIPID PANEL: CPT

## 2025-02-20 PROCEDURE — 84460 ALANINE AMINO (ALT) (SGPT): CPT

## 2025-02-20 PROCEDURE — 84450 TRANSFERASE (AST) (SGOT): CPT

## 2025-02-20 PROCEDURE — 36415 COLL VENOUS BLD VENIPUNCTURE: CPT

## 2025-02-26 RX ORDER — METFORMIN HYDROCHLORIDE 750 MG/1
750 TABLET, EXTENDED RELEASE ORAL 2 TIMES DAILY WITH MEALS
Qty: 180 TABLET | Refills: 0 | OUTPATIENT
Start: 2025-02-26

## 2025-02-27 ENCOUNTER — TELEPHONE (OUTPATIENT)
Dept: DERMATOLOGY CLINIC | Facility: CLINIC | Age: 70
End: 2025-02-27

## 2025-02-27 NOTE — TELEPHONE ENCOUNTER
Patient called    Asking to be referred to general surgeon in [facility] area. Discussed surgeon at last visit on Please call  lov 6/17/24

## 2025-02-27 NOTE — TELEPHONE ENCOUNTER
LOV 6/17/24 - Dr. Rand - is there a general surgeon in [place] that you would recommend?  Thank you.

## 2025-02-28 ENCOUNTER — PATIENT OUTREACH (OUTPATIENT)
Dept: CASE MANAGEMENT | Age: 70
End: 2025-02-28

## 2025-02-28 ENCOUNTER — TELEPHONE (OUTPATIENT)
Dept: FAMILY MEDICINE CLINIC | Facility: CLINIC | Age: 70
End: 2025-02-28

## 2025-02-28 ENCOUNTER — HOSPITAL ENCOUNTER (EMERGENCY)
Facility: HOSPITAL | Age: 70
Discharge: HOME | End: 2025-02-28
Attending: EMERGENCY MEDICINE
Payer: MEDICARE

## 2025-02-28 VITALS
BODY MASS INDEX: 32 KG/M2 | OXYGEN SATURATION: 99 % | TEMPERATURE: 98 F | DIASTOLIC BLOOD PRESSURE: 80 MMHG | WEIGHT: 179.69 LBS | RESPIRATION RATE: 18 BRPM | HEART RATE: 100 BPM | SYSTOLIC BLOOD PRESSURE: 134 MMHG

## 2025-02-28 DIAGNOSIS — L72.3 INFECTED SEBACEOUS CYST: Primary | ICD-10-CM

## 2025-02-28 DIAGNOSIS — L08.9 INFECTED SEBACEOUS CYST: Primary | ICD-10-CM

## 2025-02-28 PROCEDURE — 10061 I&D ABSCESS COMP/MULTIPLE: CPT

## 2025-02-28 PROCEDURE — 99284 EMERGENCY DEPT VISIT MOD MDM: CPT

## 2025-02-28 PROCEDURE — 87205 SMEAR GRAM STAIN: CPT | Performed by: EMERGENCY MEDICINE

## 2025-02-28 PROCEDURE — 87070 CULTURE OTHR SPECIMN AEROBIC: CPT | Performed by: EMERGENCY MEDICINE

## 2025-02-28 RX ORDER — CLINDAMYCIN HYDROCHLORIDE 300 MG/1
300 CAPSULE ORAL 3 TIMES DAILY
Qty: 21 CAPSULE | Refills: 0 | Status: SHIPPED | OUTPATIENT
Start: 2025-02-28 | End: 2025-03-07

## 2025-02-28 RX ORDER — ACETAMINOPHEN 500 MG
500 TABLET ORAL EVERY 6 HOURS PRN
COMMUNITY

## 2025-02-28 NOTE — TELEPHONE ENCOUNTER
Patient seen in ED today for open wound and was told she needs to see pcp in 2-3 days to take tape out. Patient requesting appointment. Dr. Lamar not here next week. Please advise.

## 2025-02-28 NOTE — TELEPHONE ENCOUNTER
Spoke to patient and informed of ED recommendation to follow up with general surgery after 2 or 3 days for reexamination and packing removal.     Patient verbalized understanding, states that she was able to schedule an appointment with general surgery on 3/3. Patient does not need an appointment with PCP at this time.

## 2025-02-28 NOTE — ED PROVIDER NOTES
Patient Seen in: [facility] Emergency Department      History     Chief Complaint   Patient presents with    Abscess     Stated Complaint: abcess on back    Subjective:   HPI      Patient tells me that she has had a lump on her back for some time now but just recently it has become more swollen and more sore.  She saw a dermatologist who referred her to a general surgeon for excision.  Patient reports that she cannot contact the general surgeon and is afraid that the area may be infected  No fevers.  No chills.    Objective:     Past Medical History:    Abnormal pelvic exam    Coronary atherosclerosis    Diabetes (HCC)    Fuchs' corneal dystrophy    High blood pressure    High cholesterol    Hyperlipidemia    Hypertension    Lyme disease    OSTEOARTHRITIS    Post-operative nausea and vomiting    pt states she often wakes up during surgery    PTSD (post-traumatic stress disorder)    9/11    Rotator cuff injury              Past Surgical History:   Procedure Laterality Date    Benign biopsy left Left 1993    Carpal tunnel release      Cath drug eluting stent      Eye surgery      Foot surgery      Hand/finger surgery unlisted Right 10/19/20--Dr. Paulo Shay    ring finger A1 pulley release/trigger finger release    Jose localization wire 1 site left (cpt=19281)      1990's bn    Other  1994    Other accessory      dissected LAD, double stented 8/11/23    Other surgical history      A RCR RIGHT SHOULDER     Other surgical history      TRIGGER RELEASE RIGHT THUMB    Other surgical history      CTR BILATERAL      Other surgical history      NEUROMA REMOVED FOOT     Repair rotator cuff,acute                  Social History     Socioeconomic History    Marital status: Life Partner    Number of children: 1    Years of education: 17   Occupational History    Occupation: business      Comment: [employer]    Occupation: unemployed now   Tobacco Use    Smoking status: Former     Current  packs/day: 0.00     Average packs/day: 1 pack/day for 2.0 years (2.0 ttl pk-yrs)     Types: Cigarettes     Start date: 1972     Quit date: 1974     Years since quittin.7    Smokeless tobacco: Never    Tobacco comments:     Started slowly at age 18 and quit at age 20.   Vaping Use    Vaping status: Never Used   Substance and Sexual Activity    Alcohol use: Not Currently     Comment: 1 glass of wine at holiday dinner    Drug use: Never    Sexual activity: Yes     Partners: Female, Male     Comment: patient has one child    Other Topics Concern    Caffeine Concern Yes     Comment: 1 c. coffee in the morning    Exercise Yes     Comment: 1-3 miles/day    Seat Belt Yes    Reaction to local anesthetic No     Comment: Heart palpatations at Dentist    Pt has a pacemaker No    Pt has a defibrillator No   Social History Narrative    ** Merged History Encounter **          Social Drivers of Health     Food Insecurity: No Food Insecurity (2025)    Received from [facility]    Food Insecurity     Currently or in the past 3 months, have you worried your food would run out before you had money to buy more?: No     In the past 12 months, have you run out of food or been unable to get more?: No   Transportation Needs: No Transportation Needs (2025)    Received from [facility]    Transportation Needs     Currently or in the past 3 months, has lack of transportation kept you from medical appointments, getting food or medicine, or providing care to a family member?: Unrecognized value     Medical Transportation Needs?: No                  Physical Exam     ED Triage Vitals [25 0849]   /80   Pulse 100   Resp 18   Temp 98 °F (36.7 °C)   Temp src Temporal   SpO2 99 %   O2 Device None (Room air)       Current Vitals:   Vital Signs  BP: 134/80  Pulse: 100  Resp: 18  Temp: 98 °F (36.7 °C)  Temp src: Temporal    Oxygen Therapy  SpO2: 99 %  O2 Device: None (Room  air)        Physical Exam  This is an alert adult woman who appears in no extraordinary distress awake and conversant  Just the left of midline on the lower thoracic back there is what appears to be a sebaceous cyst.  There is a nickel sized area of soft tissue swelling with exquisite tenderness even to gentle palpation.  No erythema is noted.  No drainage.    ED Course     Labs Reviewed   AEROBIC BACTERIAL CULTURE                   MDM      Patient has likely inclusion cyst of the back.  Considering how tender it is even without erythema I believe there may be abscess formation.  I discussed the risk and benefits of incision and drainage.  Patient consented    Patient placed prone.  The area anesthetized, then prepped and draped.   A 1 cm incision was made over the area of maximal soft tissue swelling.  There was immediate release of cheesy material that was foul-smelling and also purulent  The wound was gently probed with a cotton tip applicator and culture obtained  With gentle massage, additional sebaceous material was expressed.  Half-inch plain gauze packing was placed  Wound was dressed and bandaged    I discussed incision and drainage aftercare  I recommend follow-up with general surgery for reexamination and packing removal after 2 or 3 days        Medical Decision Making      Disposition and Plan     Clinical Impression:  1. Infected sebaceous cyst         Disposition:  Discharge  2/28/2025  9:33 am    Follow-up:  Quique Garcia MD  [address] [facility] [address] [phone]    Call today  For wound re-check and packing removal after 2 or 3 days          Medications Prescribed:  Current Discharge Medication List        START taking these medications    Details   clindamycin 300 MG Oral Cap Take 1 capsule (300 mg total) by mouth 3 (three) times daily for 7 days.  Qty: 21 capsule, Refills: 0                 Supplementary Documentation:

## 2025-02-28 NOTE — PROGRESS NOTES
Transitions of Care Navigation  Discharge Date: 25  Contact Date: 2025       Disposition and Plan      Clinical Impression:  1. Infected sebaceous cyst      Transitions of Care Assessment:  LAM Initial Assessment    General:  Assessment completed with: Patient  Patient Subjective: Pt feeling much better, since ER discharge--dressing dry and intact--taking Tylenol 500 mg one tab, q 6 hrs as needed for minimal discomfort, icing, as needed. Pt confirms she started Cipro--has taken two doses, already, appetite good, staying hydrated, independent with ADLs.   Pt denies fever, chills, headache, vision changes, dizziness, nausea, vomiting, diarrhea, bleeding, irregular heartbeat or fast pulse, loss of vision, speech or strength or coordination in any body part, intractable back pain, chest pain or shortness of breath at this time.  Chief Complaint: Clinical Impression:  1. Infected sebaceous cyst  Verify patient name and  with patient/ caregiver: Yes    Hospital Stay/Discharge:  Tell me what you understand of why you were in the hospital or emergency department: Abscess to central back, has been growing in size recently. Has had it for about two years, was able to pop \"something out\". Seen Derm late last year, was told to see Gen Surg to have it removed, didn't follow up.  Prior to leaving the hospital were your Discharge Instructions reviewed with you?: Yes  Did you receive a copy of your written Discharge Instructions?: Yes  What questions do you have about your Discharge Instructions?: none  Do you feel better or worse since you left the hospital or emergency department?: Better    Follow - Up Appointment:  Do you have a follow-up appointment?: Yes  Date: 25  Physician: gen sx PA  Are there any barriers to getting to your follow-up appointment?: No    Home Health/DME:  Prior to leaving the hospital was Home Health (HH) arranged for you?: No     Prior to leaving the hospital or emergency department was  Durable Medical Equipment (DME), medical supplies, or infusions arranged for you?: No  Are DME/medical supply/infusions needs identified by staff during this assessment?: No     Medications/Diet:  Did any of your medications change, during or after your hospital stay or ED visit?: Yes  Do you have your new or updated medications?: Yes  Do you understand what your medications are for and possible side effects?: Yes  Are there any reasons that keep you from taking your medication as prescribed?: No  Any concerns about medication refills?: No    Were you given a different diet per your Discharge Instructions?: No  Reason: n/a     Questions/Concerns:  Do you have any questions or concerns that have not been discussed?: No     LAM Follow-up Assessment    General:  Assessment completed with: Patient  Patient Subjective: Pt feeling much better, since ER discharge--dressing dry and intact--taking Tylenol 500 mg one tab, q 6 hrs as needed for minimal discomfort, icing, as needed. Pt confirms she started Cipro--has taken two doses, already, appetite good, staying hydrated, independent with ADLs.   Pt denies fever, chills, headache, vision changes, dizziness, nausea, vomiting, diarrhea, bleeding, irregular heartbeat or fast pulse, loss of vision, speech or strength or coordination in any body part, intractable back pain, chest pain or shortness of breath at this time.  Chief Complaint: Clinical Impression:  1. Infected sebaceous cyst  Community Resources: Other (none)    Progress/Care Plan:  Is the patient progressing as planned?: Yes  Frequency/Follow Up Plan: Patient has met goals, no further outreach needed.     Nursing Interventions: Discussed diet, activity, medications and need for f/u visits. Pt confirms gen sx appts, as scheduled. Pt already spoke with Dr. Lamar's office today--see notes--no need for PCP appt, at this time. Patient has met goals, no further outreach needed. Patient aware when to contact  PCP/specialists and when to seek emergency care. No further questions/concerns at this time.    Medications:  Medication Reconciliation:  I am aware of an inpatient discharge within the last 30 days.  The discharge medication list has been reconciled with the patient's current medication list and reviewed by me. See medication list for additions of new medication, and changes to current doses of medications and discontinued medications.  Current Outpatient Medications   Medication Sig Dispense Refill    clindamycin 300 MG Oral Cap Take 1 capsule (300 mg total) by mouth 3 (three) times daily for 7 days. 21 capsule 0    colchicine 0.6 MG Oral Tab Take 1 tablet (0.6 mg total) by mouth daily. 90 tablet 1    allopurinol 100 MG Oral Tab Take 1 tablet (100 mg total) by mouth daily for 30 days, THEN 2 tablets (200 mg total) daily for 30 days, THEN 3 tablets (300 mg total) daily. 195 tablet 1    L-methylfolate 15 MG Oral Tab Take 1 tablet (15 mg total) by mouth daily. 90 tablet 3    Evolocumab (REPATHA SURECLICK) 140 MG/ML Subcutaneous Solution Auto-injector Inject into the skin Every 2 (two) weeks.      losartan 25 MG Oral Tab Take 1 tablet (25 mg total) by mouth daily.      aspirin 81 MG Oral Tab EC aspirin 81 mg tablet,delayed release, [RxNorm: 619669]      prasugrel 10 MG Oral Tab Take 1 tablet (10 mg total) by mouth daily. 90 tablet 3    loteprednol 0.5 % Ophthalmic Suspension Place 1 drop into both eyes daily. 1% per pt           START taking these medications     Details   clindamycin 300 MG Oral Cap Take 1 capsule (300 mg total) by mouth 3 (three) times daily for 7 days.  Qty: 21 capsule, Refills: 0     Follow-up Appointments:  Your appointments       Date & Time Appointment Department (Center)    Mar 03, 2025 9:00 AM CST Exam - New Patient with [facility]        Mar 24, 2025 10:00 AM CDT Exam - New Patient with Dickson  MD Pamela [place], [facility] ([facility])        Apr 11, 2025 11:45 AM CDT Exam - Established with Claudia Singleton MD [place], [facility] ([facility])        Apr 25, 2025 10:45 AM CDT Exam - Established with Emily Aguilar PA-C [place], [facility] [place] ([facility])        Aug 16, 2025 9:40 AM CDT Screening Mammogram 3D [place] with [facility] RM1 [facility] Mammography - [address] ([facility])    You may be subject to a fee if you do not show up for your appointment or you cancel within 24 hours of your appointment.    Please arrive 15 minutes prior to your appointment.    If breastfeeding, pump or breastfeed one hour prior to your appointment time.    Continuous glucose monitors must be removed so the appointment should be scheduled near the normal replacement date.    It is important to bring your previous mammography films to your appointment so that the radiologist has previous images to compare this exam to. Having your previous mammograms on file helps the radiologist notice subtle changes in your breast tissue that can alert us to a need for further studies. Without these images, the radiologist will not be able to detect the subtle changes and your final reading will be delayed until we receive them.    It is important that the annual screening mammogram be scheduled at least a year and a day after your last screening mammogram to ensure your insurance plan will cover the cost, unless you are experiencing breast related symptoms.    Do NOT use perfumes, deodorant, talcum powder, lotions, or creams on your breasts or underarms. They leave a coating that may be picked up by the x-rays, thereby distorting the mammogram.    Wear a two piece outfit the day of the exam. This allows you to be more comfortable during the exam.      Aug 16, 2025  9:40 AM CDT Screening Mammogram 3D Ryan with [facility] DEXA RM1 [facility] DEXA - [address] ([facility] [place])    You may be subject to a fee if you do not show up for your appointment or you cancel within 24 hours of your appointment.    Please arrive 15 minutes prior to your appointment.    If breastfeeding, pump or breastfeed one hour prior to your appointment time.    Continuous glucose monitors must be removed so the appointment should be scheduled near the normal replacement date.    It is important to bring your previous mammography films to your appointment so that the radiologist has previous images to compare this exam to. Having your previous mammograms on file helps the radiologist notice subtle changes in your breast tissue that can alert us to a need for further studies. Without these images, the radiologist will not be able to detect the subtle changes and your final reading will be delayed until we receive them.    It is important that the annual screening mammogram be scheduled at least a year and a day after your last screening mammogram to ensure your insurance plan will cover the cost, unless you are experiencing breast related symptoms.    Do NOT use perfumes, deodorant, talcum powder, lotions, or creams on your breasts or underarms. They leave a coating that may be picked up by the x-rays, thereby distorting the mammogram.    Wear a two piece outfit the day of the exam. This allows you to be more comfortable during the exam.      Aug 16, 2025 9:40 AM CDT Screening Mammogram 3D Ryan with [facility] XR RM1 [facility] X-ray - [address] ([place] [place])    You may be subject to a fee if you do not show up for your appointment or you cancel within 24 hours of your appointment.    Please arrive 15 minutes prior to your appointment.    If breastfeeding, pump or breastfeed one hour prior to your appointment time.    Continuous glucose monitors must be removed so the appointment should be scheduled near the normal  replacement date.    It is important to bring your previous mammography films to your appointment so that the radiologist has previous images to compare this exam to. Having your previous mammograms on file helps the radiologist notice subtle changes in your breast tissue that can alert us to a need for further studies. Without these images, the radiologist will not be able to detect the subtle changes and your final reading will be delayed until we receive them.    It is important that the annual screening mammogram be scheduled at least a year and a day after your last screening mammogram to ensure your insurance plan will cover the cost, unless you are experiencing breast related symptoms.    Do NOT use perfumes, deodorant, talcum powder, lotions, or creams on your breasts or underarms. They leave a coating that may be picked up by the x-rays, thereby distorting the mammogram.    Wear a two piece outfit the day of the exam. This allows you to be more comfortable during the exam.            [facility] DEXA - [facility]  [place] [address]  [address]  [facility] [address]  [phone] [facility] Mammography - [address]  [place]  [address]  [facility] [address]  [phone] [facility] X-ray - [facility]  [place] [address]  [address]  [facility] [address]  [phone]    [facility], [facility] [address]  [address]  [facility] [id number] [phone] [facility], [facility] at [address]  [facility] [address] [phone] [facility], [facility] Three [facility]  [address] [facility] [id number] [phone]            Transitional Care Clinic  Was TCC Ordered: No    Primary Care Provider (If no TCC appointment)  Does patient already have a PCP appointment scheduled?  No  Nurse Care Manager Attempted to schedule PCP office ER Follow-up appointment with patient   -If no appointment scheduled: Explain: pt and PCP deny need--pt to f/u with gen sx only, as scheduled.    Specialist  Does the patient have any other follow-up appointment(s) need to be scheduled? No     Book By Date: 3/07/25

## 2025-02-28 NOTE — ED INITIAL ASSESSMENT (HPI)
Abscess to central back, has been growing in size recently. Has had it for about two years, was able to pop \"something out\". Seen Derm late last year, was told to see Gen Surg to have it removed, didn't follow up.

## 2025-02-28 NOTE — DISCHARGE INSTRUCTIONS
Keep wound clean and dry  Tylenol for pain  You may apply a cool compress to the area 20 minutes at a time    Wound check with your primary care provider and/or general surgeon in 2 or 3 days for reexamination, culture check, and packing removal

## 2025-02-28 NOTE — PROGRESS NOTES
Left message on mailbox for patient to call nurse care manager back for transitions of care call.  Nurse care  information [phone] included in message.       Disposition and Plan      Clinical Impression:  1. Infected sebaceous cyst             START taking these medications     Details   clindamycin 300 MG Oral Cap Take 1 capsule (300 mg total) by mouth 3 (three) times daily for 7 days.  Qty: 21 capsule, Refills: 0     Follow-up Appointments:  Follow-up:  Quique Garcia MD  [address] [facility] [address]  [phone]     Call today  For wound re-check and packing removal after 2 or 3 days     Future Appointments   Date Time Provider Department Center   3/3/2025  9:00 AM EMG GEN SURG PA [facility] [id number]   3/24/2025 10:00 AM Pamela Forman MD [facility] [id number]   4/11/2025 11:45 AM Claudia Singleton MD [id number] EM[facility] [place]   4/25/2025 10:45 AM Emily Aguilar, JUAN LUIS EMMG INT MED E[facility] [place]   8/16/2025  9:40 AM HOB BRETT RM1 [facility] MAMMO [place]

## 2025-03-03 ENCOUNTER — OFFICE VISIT (OUTPATIENT)
Facility: LOCATION | Age: 70
End: 2025-03-03
Payer: MEDICARE

## 2025-03-03 VITALS
RESPIRATION RATE: 16 BRPM | DIASTOLIC BLOOD PRESSURE: 66 MMHG | HEART RATE: 76 BPM | SYSTOLIC BLOOD PRESSURE: 114 MMHG | TEMPERATURE: 98 F | OXYGEN SATURATION: 98 %

## 2025-03-03 DIAGNOSIS — L08.9 INFECTED SEBACEOUS CYST: Primary | ICD-10-CM

## 2025-03-03 DIAGNOSIS — L72.3 INFECTED SEBACEOUS CYST: Primary | ICD-10-CM

## 2025-03-03 DIAGNOSIS — Z51.89 ENCOUNTER FOR WOUND RE-CHECK: ICD-10-CM

## 2025-03-03 PROCEDURE — 99212 OFFICE O/P EST SF 10 MIN: CPT

## 2025-03-03 NOTE — PROGRESS NOTES
New Patient Visit Note       Active Problems  1. Infected sebaceous cyst    2. Encounter for wound re-check         Chief Complaint   Chief Complaint   Patient presents with    New Patient     NP- ER f/u 02/28/2025 Abscess on back I@D wound check/remove packing. Pt reports a bit of pain. Pt reports she is overall feeling better but has a difficult time assessing the wound.           History of Present Illness   The patient presents for continued care and evaluation following an emergency department visit requiring an I&D of an infected sebaceous cyst on her back with Dr. Bacon on 2/28/2025.     Dr. Franklin made a 1 m incision over the area of maximal soft tissue swelling.  Foul smelling purulent drainage was expressed.  The wound was packed.  She was instructed to follow-up with general surgery in 2 to 3 days.  She presents today for wound check.    The patient states she has had the cyst for 2 years. She states it was  drained once. She was seen by two different dermatologists who recommended  she follow up with a general surgeon.     She states for the last 6  months, the cyst has increased in size and become more painful.     She states she did have associated aching of her arms bilaterally and decreased appetite. She states she felt \"sick.\" She denies fevers or chills.     She is currently on antibiotics.     The patient states since drainage of her back cyst, her symptoms have improved.  She currently has a dry gauze over the wound and packing in place.  She denies significant drainage from the wound.  She states the swelling of the cyst has decreased over the last 24 hours.      Allergies  Katelyn is allergic to codeine, monosodium glutamate, monosodium glutamate, statins, sulfa antibiotics, atorvastatin, fentanyl, hydrocodone, hydrocodone-acetaminophen, jardiance [empagliflozin], augmentin [amoxicillin-pot clavulanate], ezetimibe, propoxyphene, and propoxyphene n-apap.    Past Medical / Surgical / Social / Family  History    The past medical and past surgical history have been reviewed by me today.     Past Medical History:    Abnormal pelvic exam    Coronary atherosclerosis    Diabetes (HCC)    Fuchs' corneal dystrophy    High blood pressure    High cholesterol    Hyperlipidemia    Hypertension    Lyme disease    OSTEOARTHRITIS    Post-operative nausea and vomiting    pt states she often wakes up during surgery    PTSD (post-traumatic stress disorder)        Rotator cuff injury     Past Surgical History:   Procedure Laterality Date    Benign biopsy left Left     Carpal tunnel release      Cath drug eluting stent      Eye surgery      Foot surgery      Hand/finger surgery unlisted Right 10/19/20--Dr. Paulo Shay    ring finger A1 pulley release/trigger finger release    Jose localization wire 1 site left (cpt=19281)       bn    Other      Other accessory      dissected LAD, double stented 23    Other surgical history      A RCR RIGHT SHOULDER     Other surgical history      TRIGGER RELEASE RIGHT THUMB    Other surgical history      CTR BILATERAL      Other surgical history      NEUROMA REMOVED FOOT     Repair rotator cuff,acute         The family history and social history have been reviewed by me today.    Family History   Problem Relation Age of Onset    Heart Disorder Father     Heart Attack Father          1980 age 80.  First heart incident age 56. Gout, failed kidneys.    Migraines Father         chronic when alive    Depression Sister     Kidney Disease Sister     Carotid stenosis Sister     Heart Disease Sister         Aorta valve leakage    Lipids Sister     Heart Disorder Maternal Grandfather     Heart Attack Maternal Grandfather         Heart attack - cause of death    Heart Disorder Paternal Grandfather     Heart Attack Paternal Grandfather             Breast Cancer Paternal Aunt 70        Dx Breast CA age 70    Dementia Mother         Alcohol induced dementia    Alcohol and  Other Disorders Associated Mother     Heart Attack Mother          2020.  Stent in heart .    Heart Attack Maternal Grandmother         .    Stroke Paternal Grandmother         minor stroke,     Tremor Paternal Grandmother         Parkinson's Disease    Bleeding Disorders Neg     Colon Cancer Neg     Ovarian Cancer Neg      Social History     Socioeconomic History    Marital status: Life Partner    Number of children: 1    Years of education: 17   Occupational History    Occupation: business      Comment: [employer]    Occupation: unemployed now   Tobacco Use    Smoking status: Former     Current packs/day: 0.00     Average packs/day: 1 pack/day for 2.0 years (2.0 ttl pk-yrs)     Types: Cigarettes     Start date: 1972     Quit date: 1974     Years since quittin.8    Smokeless tobacco: Never    Tobacco comments:     Started slowly at age 18 and quit at age 20.   Vaping Use    Vaping status: Never Used   Substance and Sexual Activity    Alcohol use: Not Currently     Comment: 1 glass of wine at holiday dinner    Drug use: Never    Sexual activity: Yes     Partners: Female, Male     Comment: patient has one child    Other Topics Concern    Caffeine Concern Yes     Comment: 1 c. coffee in the morning    Exercise Yes     Comment: 1-3 miles/day    Seat Belt Yes    Reaction to local anesthetic No     Comment: Heart palpatations at Dentist    Pt has a pacemaker No    Pt has a defibrillator No        Current Outpatient Medications:     clindamycin 300 MG Oral Cap, Take 1 capsule (300 mg total) by mouth 3 (three) times daily for 7 days., Disp: 21 capsule, Rfl: 0    acetaminophen 500 MG Oral Tab, Take 1 tablet (500 mg total) by mouth every 6 (six) hours as needed for Pain., Disp: , Rfl:     colchicine 0.6 MG Oral Tab, Take 1 tablet (0.6 mg total) by mouth daily., Disp: 90 tablet, Rfl: 1    allopurinol 100 MG Oral Tab, Take 1 tablet (100 mg total)  by mouth daily for 30 days, THEN 2 tablets (200 mg total) daily for 30 days, THEN 3 tablets (300 mg total) daily., Disp: 195 tablet, Rfl: 1    L-methylfolate 15 MG Oral Tab, Take 1 tablet (15 mg total) by mouth daily., Disp: 90 tablet, Rfl: 3    Evolocumab (REPATHA SURECLICK) 140 MG/ML Subcutaneous Solution Auto-injector, Inject into the skin Every 2 (two) weeks., Disp: , Rfl:     losartan 25 MG Oral Tab, Take 1 tablet (25 mg total) by mouth daily., Disp: , Rfl:     aspirin 81 MG Oral Tab EC, aspirin 81 mg tablet,delayed release, [RxNorm: 232344], Disp: , Rfl:     prasugrel 10 MG Oral Tab, Take 1 tablet (10 mg total) by mouth daily., Disp: 90 tablet, Rfl: 3    loteprednol 0.5 % Ophthalmic Suspension, Place 1 drop into both eyes daily. 1% per pt, Disp: , Rfl:       Review of Systems  The Review of Systems has been reviewed by me during today.  Review of Systems    Physical Findings   /66 (BP Location: Right arm, Patient Position: Sitting, Cuff Size: adult)   Pulse 76   Temp 97.8 °F (36.6 °C) (Temporal)   Resp 16   LMP  (LMP Unknown)   SpO2 98%   Physical Exam  Vitals and nursing note reviewed.   Constitutional:       General: She is not in acute distress.     Appearance: Normal appearance.   HENT:      Head: Normocephalic and atraumatic.      Right Ear: External ear normal.      Left Ear: External ear normal.      Nose: Nose normal.   Eyes:      General: No scleral icterus.     Conjunctiva/sclera: Conjunctivae normal.   Musculoskeletal:      Cervical back: Normal range of motion and neck supple.   Skin:            Comments: Clinical exam of the of the patient's mid back reveals an approximately 1 cm I&D site with surrounding induration.  No significant surrounding erythema or overlying warmth.  Took the opportunity at today's visit to remove her iodoform packing and placed a dry dressing over the wound.  No active drainage or bleeding at the time of today's exam.   Neurological:      Mental Status: She is  alert.   Psychiatric:         Mood and Affect: Mood normal.         Behavior: Behavior normal.         Thought Content: Thought content normal.             Assessment   1. Infected sebaceous cyst    2. Encounter for wound re-check          Plan   The patient is doing well status post emergency department visit with I&D of posterior back cyst on 2/28/2025.      The patient should continue with local wound care.  I recommend cleansing the wound with soap and water once daily.  She should avoid scrubbing the wound.  She may apply a dry dressing over the wound for any drainage.    I explained to the patient that her pain and swelling at the infected cyst site should continue to improve.    She should continue her antibiotic as prescribed by the emergency department.    She should continue to monitor for signs and symptoms of worsening infection such as fevers, chills, increased drainage.    The patient may take ibuprofen and Tylenol as needed for pain management.  They may also utilize heating pads or ice packs for comfort measures.    All of the patient's questions were answered.  The patient verbalized understanding and agreement with the plan of care.    I would like this patient to follow-up in approximately 10 days for a wound check.  She has a follow-up scheduled with Dr. Garcia next week. This patient should return urgently for any problems or complications related to the surgical intervention.         No orders of the defined types were placed in this encounter.      Imaging & Referrals   None    Follow Up  Return in about 9 days (around 3/12/2025).    Lucy Tee PA-C

## 2025-03-04 NOTE — TELEPHONE ENCOUNTER
I do not personally know any surgeons at [facility].  However, did check with colleagues who refer to surgeons at [facility], names of surgeons they are familiar with :Charly Snell and Taryn from [facility] ( It looks like Dr. Carmona -she has an appoint with him- is part of their group).

## 2025-03-07 ENCOUNTER — LAB ENCOUNTER (OUTPATIENT)
Dept: LAB | Age: 70
End: 2025-03-07
Attending: INTERNAL MEDICINE
Payer: MEDICARE

## 2025-03-07 DIAGNOSIS — M35.3 PMR (POLYMYALGIA RHEUMATICA) (HCC): ICD-10-CM

## 2025-03-07 LAB
CRP SERPL-MCNC: <0.4 MG/DL (ref ?–0.5)
ERYTHROCYTE [SEDIMENTATION RATE] IN BLOOD: 20 MM/HR

## 2025-03-07 PROCEDURE — 86140 C-REACTIVE PROTEIN: CPT

## 2025-03-12 ENCOUNTER — OFFICE VISIT (OUTPATIENT)
Facility: LOCATION | Age: 70
End: 2025-03-12
Payer: MEDICARE

## 2025-03-12 VITALS
OXYGEN SATURATION: 98 % | HEART RATE: 68 BPM | TEMPERATURE: 97 F | SYSTOLIC BLOOD PRESSURE: 129 MMHG | DIASTOLIC BLOOD PRESSURE: 74 MMHG

## 2025-03-12 DIAGNOSIS — L72.3 INFECTED SEBACEOUS CYST: Primary | ICD-10-CM

## 2025-03-12 DIAGNOSIS — L08.9 INFECTED SEBACEOUS CYST: Primary | ICD-10-CM

## 2025-03-12 PROCEDURE — 99202 OFFICE O/P NEW SF 15 MIN: CPT | Performed by: STUDENT IN AN ORGANIZED HEALTH CARE EDUCATION/TRAINING PROGRAM

## 2025-03-12 NOTE — PROGRESS NOTES
Follow Up Visit Note       Active Problems      1. Infected sebaceous cyst          Chief Complaint   Chief Complaint   Patient presents with    Follow - Up     ER f/u 2/28/2025,  ABSCESS on Back I/D for wound check and remove packing wound check, pt states slight drainage, no other symptoms.         History of Present Illness  69 year old female presents for evaluation of an abscess on her back. She was seen on 2/28/2025 and had an I/D performed. She is in need of a wound check and to remove the packing in the wound. She reports slight drainage, but no other symptoms. She does report a small lump on her right wrist that she would like checked out. She has been doing well in general with no fever or any other symptoms.    She is currently on baby aspirin and prasugrel. She has a history of SVT. She also reports a history of poor reactions to anesthesia.    Allergies  Katelyn is allergic to codeine, monosodium glutamate, monosodium glutamate, statins, sulfa antibiotics, atorvastatin, fentanyl, hydrocodone, hydrocodone-acetaminophen, jardiance [empagliflozin], augmentin [amoxicillin-pot clavulanate], ezetimibe, propoxyphene, and propoxyphene n-apap.    Past Medical / Surgical / Social / Family History    The past medical and past surgical history have been reviewed by me today.    Past Medical History:    Abnormal pelvic exam    Coronary atherosclerosis    Diabetes (HCC)    Fuchs' corneal dystrophy    High blood pressure    High cholesterol    Hyperlipidemia    Hypertension    Lyme disease    Obesity    OSTEOARTHRITIS    Osteoarthritis    Bad After covid    Post-operative nausea and vomiting    pt states she often wakes up during surgery    PTSD (post-traumatic stress disorder)    9/11    Rotator cuff injury     Past Surgical History:   Procedure Laterality Date    Benign biopsy left Left 1993    Breast biopsy  1994    Carpal tunnel release      Cataract  2015    Fuchs dystrophy    Cath drug eluting stent      Eye  surgery      Foot surgery      Hand/finger surgery unlisted Right 10/19/20--Dr. 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; w/ stent< tissue valve  2023    Disection of LAD 2 stents       The family history and social history have been reviewed by me today.    Family History   Problem Relation Age of Onset    Heart Disorder Father     Heart Attack Father           age 80.  First heart incident age 56. Gout, failed kidneys.    Migraines Father         chronic when alive    Heart Disease Father     Depression Sister     Kidney Disease Sister     Carotid stenosis Sister     Heart Disease Sister         Aorta valve leakage    Lipids Sister     Heart Disorder Maternal Grandfather     Heart Attack Maternal Grandfather         Heart attack - cause of death    Heart Disease Maternal Grandfather     Heart Disorder Paternal Grandfather     Heart Attack Paternal Grandfather             Heart Disease Paternal Grandfather     Breast Cancer Paternal Aunt 70        Dx Breast CA age 70    Dementia Mother         Alcohol induced dementia    Alcohol and Other Disorders Associated Mother     Heart Attack Mother          2020.  Stent in heart .    Heart Disease Mother     Heart Attack Maternal Grandmother         .    Heart Disease Maternal Grandmother     Stroke Paternal Grandmother         minor stroke,     Tremor Paternal Grandmother         Parkinson's Disease    Bleeding  Disorders Neg     Colon Cancer Neg     Ovarian Cancer Neg      Social History     Socioeconomic History    Marital status: Life Partner    Number of children: 1    Years of education: 17   Occupational History    Occupation: business      Comment: [employer]    Occupation: unemployed now   Tobacco Use    Smoking status: Former     Current packs/day: 0.00     Average packs/day: 1 pack/day for 2.0 years (2.0 ttl pk-yrs)     Types: Cigarettes     Start date: 1972     Quit date: 1974     Years since quittin.8    Smokeless tobacco: Never    Tobacco comments:     Started slowly at age 18 and quit at age 20.   Vaping Use    Vaping status: Never Used   Substance and Sexual Activity    Alcohol use: Not Currently     Comment: 1 glass of wine at holiday dinner    Drug use: Never    Sexual activity: Yes     Partners: Female, Male     Comment: patient has one child    Other Topics Concern    Caffeine Concern No    Stress Concern No    Weight Concern Yes    Special Diet Yes     Comment: Low purine diet Jw 3, 2025    Exercise Yes     Comment: Spine dosorder, Inflammation & shortnees of breath    Seat Belt No    Reaction to local anesthetic No     Comment: Heart palpatations at Dentist    Pt has a pacemaker No    Pt has a defibrillator No        Current Outpatient Medications:     colchicine 0.6 MG Oral Tab, Take 1 tablet (0.6 mg total) by mouth daily., Disp: 90 tablet, Rfl: 1    allopurinol 100 MG Oral Tab, Take 1 tablet (100 mg total) by mouth daily for 30 days, THEN 2 tablets (200 mg total) daily for 30 days, THEN 3 tablets (300 mg total) daily., Disp: 195 tablet, Rfl: 1    L-methylfolate 15 MG Oral Tab, Take 1 tablet (15 mg total) by mouth daily., Disp: 90 tablet, Rfl: 3    Evolocumab (REPATHA SURECLICK) 140 MG/ML Subcutaneous Solution Auto-injector, Inject into the skin Every 2 (two) weeks., Disp: , Rfl:     losartan 25 MG Oral Tab, Take 1 tablet (25 mg total) by mouth daily.,  Disp: , Rfl:     aspirin 81 MG Oral Tab EC, aspirin 81 mg tablet,delayed release, [RxNorm: 236642], Disp: , Rfl:     prasugrel 10 MG Oral Tab, Take 1 tablet (10 mg total) by mouth daily., Disp: 90 tablet, Rfl: 3    loteprednol 0.5 % Ophthalmic Suspension, Place 1 drop into both eyes daily. 1% per pt, Disp: , Rfl:     acetaminophen 500 MG Oral Tab, Take 1 tablet (500 mg total) by mouth every 6 (six) hours as needed for Pain., Disp: , Rfl:      Review of Systems  The Review of Systems has been reviewed by me during today.  Review of Systems   Constitutional: Negative.    HENT: Negative.     Eyes: Negative.    Respiratory: Negative.     Cardiovascular: Negative.    Gastrointestinal: Negative.    Genitourinary: Negative.    Musculoskeletal: Negative.    Skin:  Positive for wound.   Neurological: Negative.    Psychiatric/Behavioral: Negative.          Physical Findings   /74 (BP Location: Right arm, Patient Position: Sitting, Cuff Size: adult)   Pulse 68   Temp 97.2 °F (36.2 °C) (Temporal)   LMP  (LMP Unknown)   SpO2 98%   Physical Exam  Constitutional:       Appearance: She is well-developed.   Cardiovascular:      Rate and Rhythm: Normal rate and regular rhythm.      Heart sounds: Normal heart sounds.   Pulmonary:      Effort: Pulmonary effort is normal.      Breath sounds: Normal breath sounds.   Abdominal:      General: Bowel sounds are normal.      Palpations: Abdomen is soft.   Musculoskeletal:      Right wrist: Deformity present.      Comments: 2 cm cyst consistent with ganglion cyst in the right lateral wrist. Non-tender   Skin:     General: Skin is warm and dry.      Findings: Wound present.      Comments: 1 cm incision that is clean, dry, intact and healing well in the center of the back.   Neurological:      Mental Status: She is alert and oriented to person, place, and time.      Deep Tendon Reflexes: Reflexes are normal and symmetric.              Assessment/Plan  1. Infected sebaceous cyst         Katelyn Gutierrez is a 69 year old female referred for evaluation of a recently drained abscess on her upper back. She was seen in the ED for an I/D on 2/28/2025. Examination showed a well healing incision in the middle of her back. I reviewed continued wound care and continued management. She also had a 2 cm cyst on her right lateral wrist that I believe is consistent with a ganglion cyst. I offered her a referral to see Dr. Jacques Webb in orthopedics for further evaluation. Otherwise, she will return in 4 weeks for a follow up evaluation of the back cyst for excision if any remaining post drainage.         No orders of the defined types were placed in this encounter.      Imaging & Referrals   None    Follow Up  No follow-ups on file.    Quique Garcia MD    The documentation for this encounter was entered by Emily Figueroa acting as a Medical Scribe for Dr. Garcia.    All medical record entries made by Scribe were at my direction and personally dictated by me. I have reviewed the chart and agree that the record accurately reflects my personal performance of the history, physical exam, assessment and plan. I have personally directed, reviewed, and agree with the instructions.     EVETTE Lamar MD

## 2025-03-13 ENCOUNTER — HOSPITAL ENCOUNTER (EMERGENCY)
Facility: HOSPITAL | Age: 70
Discharge: HOME | End: 2025-03-13
Attending: STUDENT IN AN ORGANIZED HEALTH CARE EDUCATION/TRAINING PROGRAM
Payer: MEDICARE

## 2025-03-13 ENCOUNTER — APPOINTMENT (OUTPATIENT)
Dept: GENERAL RADIOLOGY | Facility: HOSPITAL | Age: 70
End: 2025-03-13
Payer: MEDICARE

## 2025-03-13 ENCOUNTER — OFFICE VISIT (OUTPATIENT)
Dept: FAMILY MEDICINE CLINIC | Facility: CLINIC | Age: 70
End: 2025-03-13
Payer: MEDICARE

## 2025-03-13 VITALS
HEART RATE: 98 BPM | DIASTOLIC BLOOD PRESSURE: 64 MMHG | OXYGEN SATURATION: 98 % | RESPIRATION RATE: 16 BRPM | TEMPERATURE: 99 F | BODY MASS INDEX: 34 KG/M2 | SYSTOLIC BLOOD PRESSURE: 120 MMHG | WEIGHT: 193 LBS

## 2025-03-13 VITALS
DIASTOLIC BLOOD PRESSURE: 74 MMHG | HEIGHT: 63 IN | WEIGHT: 180 LBS | BODY MASS INDEX: 31.89 KG/M2 | SYSTOLIC BLOOD PRESSURE: 135 MMHG | HEART RATE: 68 BPM | RESPIRATION RATE: 19 BRPM | OXYGEN SATURATION: 99 % | TEMPERATURE: 98 F

## 2025-03-13 DIAGNOSIS — R55 NEAR SYNCOPE: ICD-10-CM

## 2025-03-13 DIAGNOSIS — R07.9 CHEST PAIN IN ADULT: Primary | ICD-10-CM

## 2025-03-13 DIAGNOSIS — B34.9 VIRAL SYNDROME: Primary | ICD-10-CM

## 2025-03-13 DIAGNOSIS — J02.9 SORE THROAT: ICD-10-CM

## 2025-03-13 LAB
ALBUMIN SERPL-MCNC: 4.9 G/DL (ref 3.2–4.8)
ALBUMIN/GLOB SERPL: 1.9 {RATIO} (ref 1–2)
ALP LIVER SERPL-CCNC: 95 U/L
ALT SERPL-CCNC: 39 U/L
ANION GAP SERPL CALC-SCNC: 8 MMOL/L (ref 0–18)
AST SERPL-CCNC: 37 U/L (ref ?–34)
ATRIAL RATE: 80 BPM
BASOPHILS # BLD AUTO: 0.04 X10(3) UL (ref 0–0.2)
BASOPHILS NFR BLD AUTO: 0.6 %
BILIRUB SERPL-MCNC: 0.5 MG/DL (ref 0.2–1.1)
BUN BLD-MCNC: 13 MG/DL (ref 9–23)
CALCIUM BLD-MCNC: 10 MG/DL (ref 8.7–10.6)
CHLORIDE SERPL-SCNC: 105 MMOL/L (ref 98–112)
CO2 SERPL-SCNC: 28 MMOL/L (ref 21–32)
CONTROL LINE PRESENT WITH A CLEAR BACKGROUND (YES/NO): YES YES/NO
CREAT BLD-MCNC: 0.96 MG/DL
EGFRCR SERPLBLD CKD-EPI 2021: 64 ML/MIN/1.73M2 (ref 60–?)
EOSINOPHIL # BLD AUTO: 0.24 X10(3) UL (ref 0–0.7)
EOSINOPHIL NFR BLD AUTO: 3.4 %
ERYTHROCYTE [DISTWIDTH] IN BLOOD BY AUTOMATED COUNT: 13.6 %
FLUAV + FLUBV RNA SPEC NAA+PROBE: NEGATIVE
FLUAV + FLUBV RNA SPEC NAA+PROBE: NEGATIVE
GLOBULIN PLAS-MCNC: 2.6 G/DL (ref 2–3.5)
GLUCOSE BLD-MCNC: 105 MG/DL (ref 70–99)
HCT VFR BLD AUTO: 39 %
HGB BLD-MCNC: 13.1 G/DL
IMM GRANULOCYTES # BLD AUTO: 0.02 X10(3) UL (ref 0–1)
IMM GRANULOCYTES NFR BLD: 0.3 %
KIT LOT #: NORMAL NUMERIC
LYMPHOCYTES # BLD AUTO: 1.45 X10(3) UL (ref 1–4)
LYMPHOCYTES NFR BLD AUTO: 20.8 %
MCH RBC QN AUTO: 31.5 PG (ref 26–34)
MCHC RBC AUTO-ENTMCNC: 33.6 G/DL (ref 31–37)
MCV RBC AUTO: 93.8 FL
MONOCYTES # BLD AUTO: 0.72 X10(3) UL (ref 0.1–1)
MONOCYTES NFR BLD AUTO: 10.3 %
NEUTROPHILS # BLD AUTO: 4.5 X10 (3) UL (ref 1.5–7.7)
NEUTROPHILS # BLD AUTO: 4.5 X10(3) UL (ref 1.5–7.7)
NEUTROPHILS NFR BLD AUTO: 64.6 %
OSMOLALITY SERPL CALC.SUM OF ELEC: 292 MOSM/KG (ref 275–295)
P AXIS: 65 DEGREES
P-R INTERVAL: 164 MS
PLATELET # BLD AUTO: 255 10(3)UL (ref 150–450)
POTASSIUM SERPL-SCNC: 4.1 MMOL/L (ref 3.5–5.1)
PROT SERPL-MCNC: 7.5 G/DL (ref 5.7–8.2)
Q-T INTERVAL: 396 MS
QRS DURATION: 74 MS
QTC CALCULATION (BEZET): 456 MS
R AXIS: 29 DEGREES
RBC # BLD AUTO: 4.16 X10(6)UL
RSV RNA SPEC NAA+PROBE: NEGATIVE
SARS-COV-2 RNA RESP QL NAA+PROBE: NOT DETECTED
SODIUM SERPL-SCNC: 141 MMOL/L (ref 136–145)
STREP GRP A CUL-SCR: NEGATIVE
T AXIS: 65 DEGREES
TROPONIN I SERPL HS-MCNC: 5 NG/L
TROPONIN I SERPL HS-MCNC: 6 NG/L
VENTRICULAR RATE: 80 BPM
WBC # BLD AUTO: 7 X10(3) UL (ref 4–11)

## 2025-03-13 PROCEDURE — 99284 EMERGENCY DEPT VISIT MOD MDM: CPT

## 2025-03-13 PROCEDURE — 80053 COMPREHEN METABOLIC PANEL: CPT | Performed by: STUDENT IN AN ORGANIZED HEALTH CARE EDUCATION/TRAINING PROGRAM

## 2025-03-13 PROCEDURE — 93005 ELECTROCARDIOGRAM TRACING: CPT

## 2025-03-13 PROCEDURE — 84484 ASSAY OF TROPONIN QUANT: CPT

## 2025-03-13 PROCEDURE — 85025 COMPLETE CBC W/AUTO DIFF WBC: CPT | Performed by: STUDENT IN AN ORGANIZED HEALTH CARE EDUCATION/TRAINING PROGRAM

## 2025-03-13 PROCEDURE — 84484 ASSAY OF TROPONIN QUANT: CPT | Performed by: STUDENT IN AN ORGANIZED HEALTH CARE EDUCATION/TRAINING PROGRAM

## 2025-03-13 PROCEDURE — 71045 X-RAY EXAM CHEST 1 VIEW: CPT

## 2025-03-13 PROCEDURE — 99285 EMERGENCY DEPT VISIT HI MDM: CPT

## 2025-03-13 PROCEDURE — 0241U SARS-COV-2/FLU A AND B/RSV BY PCR (GENEXPERT): CPT | Performed by: STUDENT IN AN ORGANIZED HEALTH CARE EDUCATION/TRAINING PROGRAM

## 2025-03-13 PROCEDURE — 87880 STREP A ASSAY W/OPTIC: CPT | Performed by: NURSE PRACTITIONER

## 2025-03-13 PROCEDURE — 36415 COLL VENOUS BLD VENIPUNCTURE: CPT

## 2025-03-13 PROCEDURE — 85025 COMPLETE CBC W/AUTO DIFF WBC: CPT

## 2025-03-13 PROCEDURE — 93010 ELECTROCARDIOGRAM REPORT: CPT

## 2025-03-13 PROCEDURE — 80053 COMPREHEN METABOLIC PANEL: CPT

## 2025-03-13 PROCEDURE — 99215 OFFICE O/P EST HI 40 MIN: CPT | Performed by: NURSE PRACTITIONER

## 2025-03-13 NOTE — PROGRESS NOTES
CHIEF COMPLAINT:     Chief Complaint   Patient presents with    Sore Throat     Extremely sore throat, aches and pains, chest pain off and on, fatigue, lack of appetite, with some nausea, had a syncope on Tuesday  . Would like to test for flu, COVID, strep etc. - Entered by patient x yesterday       HPI:   Katelyn Gutierrez is a 69 year old female who presents for upper respiratory symptoms for  2 days. Patient reports sore throat, body aches, decreased appetite. Pt states last night she had cramping chest pain and was afraid to take tylenol. She also reports 1 episode of near syncope here she says has almost blacked out. Treating symptoms with no medication at this time.  Cramping chest pian, kicked in yesterday evening, gone now. Syncope on Tuesday, cleaning the bathroom, really bad, had to sit down, very close to blacking out, has a history of presyncope due to medication, no new medication a this time. Pt believes she has been exposed to a virus, her partner has a recent cold like illness.     Current Outpatient Medications   Medication Sig Dispense Refill    colchicine 0.6 MG Oral Tab Take 1 tablet (0.6 mg total) by mouth daily. 90 tablet 1    allopurinol 100 MG Oral Tab Take 1 tablet (100 mg total) by mouth daily for 30 days, THEN 2 tablets (200 mg total) daily for 30 days, THEN 3 tablets (300 mg total) daily. 195 tablet 1    L-methylfolate 15 MG Oral Tab Take 1 tablet (15 mg total) by mouth daily. 90 tablet 3    Evolocumab (REPATHA SURECLICK) 140 MG/ML Subcutaneous Solution Auto-injector Inject into the skin Every 2 (two) weeks.      losartan 25 MG Oral Tab Take 1 tablet (25 mg total) by mouth daily.      aspirin 81 MG Oral Tab EC aspirin 81 mg tablet,delayed release, [RxNorm: 773758]      prasugrel 10 MG Oral Tab Take 1 tablet (10 mg total) by mouth daily. 90 tablet 3    loteprednol 0.5 % Ophthalmic Suspension Place 1 drop into both eyes daily. 1% per pt      acetaminophen 500 MG Oral Tab Take 1 tablet  (500 mg total) by mouth every 6 (six) hours as needed for Pain.        Past Medical History:    Abnormal pelvic exam    Coronary atherosclerosis    Diabetes (HCC)    Fuchs' corneal dystrophy    High blood pressure    High cholesterol    Hyperlipidemia    Hypertension    Lyme disease    Obesity    OSTEOARTHRITIS    Osteoarthritis    Bad After covid    Post-operative nausea and vomiting    pt states she often wakes up during surgery    PTSD (post-traumatic stress disorder)        Rotator cuff injury      Past Surgical History:   Procedure Laterality Date    Benign biopsy left Left     Breast biopsy      Carpal tunnel release      Cataract  2015    Fuchs dystrophy    Cath drug eluting stent      Eye surgery      Foot surgery      Hand/finger surgery unlisted Right 10/19/20--Dr. 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; w/ stent< tissue valve  2023    Disection of LAD 2 stents         Social History     Socioeconomic History    Marital status: Life Partner    Number of children: 1    Years of education: 17   Occupational History    Occupation: business      Comment: [employer]    Occupation: unemployed now   Tobacco Use    Smoking status: Former     Current packs/day: 0.00     Average packs/day: 1 pack/day for 2.0 years (2.0 ttl pk-yrs)     Types: Cigarettes     Start date: 1972     Quit date: 1974     Years since quittin.8    Smokeless tobacco: Never     Tobacco comments:     Started slowly at age 18 and quit at age 20.   Vaping Use    Vaping status: Never Used   Substance and Sexual Activity    Alcohol use: Not Currently     Comment: 1 glass of wine at holiday dinner    Drug use: Never    Sexual activity: Yes     Partners: Female, Male     Comment: patient has one child    Other Topics Concern    Caffeine Concern No    Stress Concern No    Weight Concern Yes    Special Diet Yes     Comment: Low purine diet Jw 3, 2025    Exercise Yes     Comment: Spine dosorder, Inflammation & shortnees of breath    Seat Belt No    Reaction to local anesthetic No     Comment: Heart palpatations at Dentist    Pt has a pacemaker No    Pt has a defibrillator No   Social History Narrative    ** Merged History Encounter **          Social Drivers of Health     Food Insecurity: No Food Insecurity (1/27/2025)    Received from [facility]    Food Insecurity     Currently or in the past 3 months, have you worried your food would run out before you had money to buy more?: No     In the past 12 months, have you run out of food or been unable to get more?: No   Transportation Needs: No Transportation Needs (1/27/2025)    Received from [facility]    Transportation Needs     Currently or in the past 3 months, has lack of transportation kept you from medical appointments, getting food or medicine, or providing care to a family member?: Unrecognized value     Medical Transportation Needs?: No         REVIEW OF SYSTEMS:   Review of Systems   Constitutional:  Positive for appetite change and fatigue. Negative for chills and fever.   HENT:  Positive for sore throat.    Cardiovascular:  Positive for chest pain.   Gastrointestinal:  Positive for nausea.   Musculoskeletal:  Positive for myalgias.          EXAM:   /64   Pulse 98   Temp 98.8 °F (37.1 °C) (Oral)   Resp 16   Wt 193 lb (87.5 kg)   LMP  (LMP Unknown)   SpO2 98%   BMI 34.19 kg/m²   Physical  Exam  Vitals and nursing note reviewed.   Constitutional:       Appearance: Normal appearance. She is not ill-appearing.   HENT:      Head: Normocephalic and atraumatic.      Right Ear: Hearing, tympanic membrane, ear canal and external ear normal. No drainage. There is no impacted cerumen. Tympanic membrane is not injected, perforated, erythematous or bulging.      Left Ear: Hearing, tympanic membrane, ear canal and external ear normal. No drainage. There is no impacted cerumen. Tympanic membrane is not injected, perforated, erythematous or bulging.      Nose: Nose normal. No mucosal edema, congestion or rhinorrhea.      Right Sinus: No maxillary sinus tenderness or frontal sinus tenderness.      Left Sinus: No maxillary sinus tenderness or frontal sinus tenderness.      Mouth/Throat:      Lips: No lesions.      Mouth: Mucous membranes are moist. No oral lesions.      Palate: No lesions.      Pharynx: Oropharynx is clear. Uvula midline. No oropharyngeal exudate or posterior oropharyngeal erythema.      Tonsils: No tonsillar exudate or tonsillar abscesses.   Eyes:      General: No scleral icterus.        Right eye: No discharge.         Left eye: No discharge.      Conjunctiva/sclera: Conjunctivae normal.   Cardiovascular:      Rate and Rhythm: Normal rate and regular rhythm.      Heart sounds: Murmur heard.   Pulmonary:      Effort: Pulmonary effort is normal.      Breath sounds: Normal breath sounds.   Lymphadenopathy:      Cervical: No cervical adenopathy.   Skin:     General: Skin is warm and dry.   Neurological:      Mental Status: She is alert and oriented to person, place, and time.   Psychiatric:         Mood and Affect: Mood normal.         Behavior: Behavior normal.           ASSESSMENT AND PLAN:   Katelyn Gutierrez is a 69 year old female who presents with upper respiratory symptoms that are consistent with    ASSESSMENT:   Encounter Diagnosis   Name Primary?    Sore throat Yes       PLAN: We reviewed  the limitations of the walk in clinic, no rapid flu testing. With symptoms of chest pain and dizziness with significant cardia disease it is important to report to nearest emergency room for prompt to evaluation. Pt agrees with plan and prefers to go to [place] emergency room. EMS transfer recommended, pt declined and states she feels well to drive. Report entered to [facility] ER arrival board.     Negative rapid strep testing today.     Meds & Refills for this Visit:  Requested Prescriptions      No prescriptions requested or ordered in this encounter

## 2025-03-13 NOTE — ED INITIAL ASSESSMENT (HPI)
Patient arrives with reports of intermittent cramping type chest pain mid sternal, intermittent \"fluttering\" to chest along with \"killer sore throat\" and URI symptoms that kept her up all night. Reports near syncope Tuesday night while cleaning the bathroom, states was able to sit down and did not pass out. Was seen at  prior to arrival, had strep swab (negative per patient).     8/11/2023 - LAD dissection, two stents placed

## 2025-03-14 NOTE — ED PROVIDER NOTES
Patient Seen in: [facility] Emergency Department      History     Chief Complaint   Patient presents with    Chest Pain Angina    Dizziness     chest pain, near syncope, viral syndrome     Stated Complaint: chest pain, near syncope, viral syndrome    Subjective:   HPI    The patient is a pleasant 69-year-old female presented to the emergency room with reports of URI symptoms lightheadedness weakness.  She states she started developing symptoms of congestion and bodyaches weakness on Tuesday.  That time she was cleaning her bathroom and she fell like she was nearly about to pass out.  She notes she has had some slight chest tightness.  She went to immediate care but given her complex past medical history and cardiac history she was advised to come to the ER for further evaluation.  At this time patient denies any active chest pain.        Objective:     Past Medical History:    Abnormal pelvic exam    Coronary atherosclerosis    Diabetes (HCC)    Fuchs' corneal dystrophy    High blood pressure    High cholesterol    Hyperlipidemia    Hypertension    Lyme disease    Obesity    OSTEOARTHRITIS    Osteoarthritis    Bad After covid    Post-operative nausea and vomiting    pt states she often wakes up during surgery    PTSD (post-traumatic stress disorder)        Rotator cuff injury              Past Surgical History:   Procedure Laterality Date    Benign biopsy left Left     Breast biopsy      Carpal tunnel release      Cataract  2015    Fuchs dystrophy    Cath drug eluting stent      Eye surgery      Foot surgery      Hand/finger surgery unlisted Right 10/19/20--Dr. 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; w/ stent< tissue valve  2023    Disection of LAD 2 stents                Social History     Socioeconomic History    Marital status: Life Partner    Number of children: 1    Years of education: 17   Occupational History    Occupation: business      Comment: [employer]    Occupation: unemployed now   Tobacco Use    Smoking status: Former     Current packs/day: 0.00     Average packs/day: 1 pack/day for 2.0 years (2.0 ttl pk-yrs)     Types: Cigarettes     Start date: 1972     Quit date: 1974     Years since quittin.8    Smokeless tobacco: Never    Tobacco comments:     Started slowly at age 18 and quit at age 20.   Vaping Use    Vaping status: Never Used   Substance and Sexual Activity    Alcohol use: Not Currently     Comment: 1 glass of wine at holiday dinner    Drug use: Never    Sexual activity: Yes     Partners: Female, Male     Comment: patient has one child    Other Topics Concern    Caffeine Concern No    Stress Concern No    Weight Concern Yes    Special Diet Yes     Comment: Low purine diet Jw 3, 2025    Exercise Yes     Comment: Spine dosorder, Inflammation & shortnees of breath    Seat Belt No    Reaction to local anesthetic No     Comment: Heart palpatations at Dentist    Pt has a pacemaker No    Pt has a defibrillator No   Social History Narrative    ** Merged History Encounter **          Social Drivers of Health     Food Insecurity: No Food Insecurity (2025)    Received from [facility]    Food Insecurity     Currently or in the past 3 months, have you worried your food would run out before you had money to buy more?: No     In the past 12 months, have you run out of food or been unable to get more?: No   Transportation  Needs: No Transportation Needs (1/27/2025)    Received from [facility]    Transportation Needs     Currently or in the past 3 months, has lack of transportation kept you from medical appointments, getting food or medicine, or providing care to a family member?: Unrecognized value     Medical Transportation Needs?: No                  Physical Exam     ED Triage Vitals [03/13/25 1450]   /83   Pulse 86   Resp 16   Temp 98.2 °F (36.8 °C)   Temp src Temporal   SpO2 94 %   O2 Device None (Room air)       Current Vitals:   Vital Signs  BP: 135/74  Pulse: 68  Resp: 19  Temp: 98.2 °F (36.8 °C)  Temp src: Temporal  MAP (mmHg): 93    Oxygen Therapy  SpO2: 99 %  O2 Device: None (Room air)        Physical Exam  Vitals and nursing note reviewed.   Constitutional:       General: She is not in acute distress.     Appearance: Normal appearance.   HENT:      Head: Normocephalic.      Nose: Nose normal.      Mouth/Throat:      Mouth: Mucous membranes are moist.   Eyes:      Extraocular Movements: Extraocular movements intact.      Pupils: Pupils are equal, round, and reactive to light.   Cardiovascular:      Rate and Rhythm: Normal rate and regular rhythm.      Pulses: Normal pulses.      Heart sounds: Murmur heard.   Pulmonary:      Effort: Pulmonary effort is normal.   Chest:      Chest wall: No mass or tenderness.   Abdominal:      General: Abdomen is flat. Bowel sounds are normal. There is no distension.      Palpations: Abdomen is soft.      Tenderness: There is no abdominal tenderness. There is no right CVA tenderness, left CVA tenderness, guarding or rebound.      Hernia: No hernia is present.   Musculoskeletal:         General: No swelling or tenderness. Normal range of motion.      Cervical back: Normal range of motion.   Skin:     General: Skin is warm and dry.   Neurological:      Mental Status: She is alert and oriented to person, place, and time. Mental status is at baseline.   Psychiatric:          Mood and Affect: Mood normal.             ED Course     Labs Reviewed   COMP METABOLIC PANEL (14) - Abnormal; Notable for the following components:       Result Value    Glucose 105 (*)     AST 37 (*)     Albumin 4.9 (*)     All other components within normal limits   TROPONIN I HIGH SENSITIVITY - Normal   TROPONIN I HIGH SENSITIVITY - Normal   SARS-COV-2/FLU A AND B/RSV BY PCR (GENEXPERT) - Normal    Narrative:     This test is intended for the qualitative detection and differentiation of SARS-CoV-2, influenza A, influenza B, and respiratory syncytial virus (RSV) viral RNA in nasopharyngeal or nares swabs from individuals suspected of respiratory viral infection consistent with COVID-19 by their healthcare provider. Signs and symptoms of respiratory viral infection due to SARS-CoV-2, influenza, and RSV can be similar.    Test performed using the Xpert Xpress SARS-CoV-2/FLU/RSV (real time RT-PCR)  assay on the "[employer] instrument, [employer], [place] [address].   This test is being used under the Food and Drug Administration's Emergency Use Authorization.    The authorized Fact Sheet for Healthcare Providers for this assay is available upon request from the laboratory.   CBC WITH DIFFERENTIAL WITH PLATELET     EKG    Rate, intervals and axes as noted on EKG Report.  Rate: 80  Rhythm: Sinus Rhythm  Reading: No significant change from prior EKG            SheXR CHEST AP PORTABLE  (CPT=71045)    Result Date: 3/13/2025  CONCLUSION:  No active disease seen.   LOCATION:  [facility]      Dictated by (CST): Dennis Sommer MD on 3/13/2025 at 4:24 PM     Finalized by (CST): Dennis Sommer MD on 3/13/2025 at 4:24 PM                [facility]              Medical Decision Making    The differential includes the following  Viral respiratory infection, pneumonia, atypical ACS    Pertinent comorbidities include  As detailed above    Pertinent social history includes  As detailed above    Labs  Negative GEN expert troponin negative x  2    Imaging studies  I reviewed the images and my independent interpreation after review is no evidence of pneumonia. Additionaly, I reviewd the radiology report that states the following as detailed above    External data reviewed  Immediate care note    Discussion of management with external providers  [facility] for close follow-up    ER course  On arrival to the ER patient normotensive not tachycardic saturating 95 to 100% on room air.  She has clear lung fields.  She has no active chest pain.  Workup negative.  I still believe she has a viral respiratory infection.  She has been encouraged to hydrate and continue conservative management.  If she has any worsening symptoms any additional presyncopal episodes or actual syncope she needs to return to the ER immediately as this could indicate more serious pathology at this time she is stable for discharge.  She her case is also been discussed with [facility] who will be following up closely with the patient.  Disposition and Plan     Clinical Impression:  1. Viral syndrome         Disposition:  Discharge  3/13/2025  7:25 pm    Follow-up:  Kenia Lamar MD  [address] [place] [id number]  [phone]    [facility]      [facility]  [facility] [address] [id number] [phone]  Follow up            Medications Prescribed:  Discharge Medication List as of 3/13/2025  7:41 PM              Supplementary Documentation:

## 2025-04-08 ENCOUNTER — LAB ENCOUNTER (OUTPATIENT)
Dept: LAB | Age: 70
End: 2025-04-08
Attending: INTERNAL MEDICINE
Payer: MEDICARE

## 2025-04-08 DIAGNOSIS — M10.9 GOUT, UNSPECIFIED CAUSE, UNSPECIFIED CHRONICITY, UNSPECIFIED SITE: ICD-10-CM

## 2025-04-08 LAB
ALBUMIN SERPL-MCNC: 4.3 G/DL (ref 3.2–4.8)
ALBUMIN/GLOB SERPL: 1.8 {RATIO} (ref 1–2)
ALP LIVER SERPL-CCNC: 87 U/L
ALT SERPL-CCNC: 30 U/L
ANION GAP SERPL CALC-SCNC: 7 MMOL/L (ref 0–18)
AST SERPL-CCNC: 33 U/L (ref ?–34)
BILIRUB SERPL-MCNC: 0.7 MG/DL (ref 0.2–1.1)
BUN BLD-MCNC: 13 MG/DL (ref 9–23)
CALCIUM BLD-MCNC: 9.9 MG/DL (ref 8.7–10.6)
CHLORIDE SERPL-SCNC: 106 MMOL/L (ref 98–112)
CK SERPL-CCNC: 59 U/L
CO2 SERPL-SCNC: 29 MMOL/L (ref 21–32)
CREAT BLD-MCNC: 0.87 MG/DL
EGFRCR SERPLBLD CKD-EPI 2021: 72 ML/MIN/1.73M2 (ref 60–?)
FASTING STATUS PATIENT QL REPORTED: YES
GLOBULIN PLAS-MCNC: 2.4 G/DL (ref 2–3.5)
GLUCOSE BLD-MCNC: 101 MG/DL (ref 70–99)
OSMOLALITY SERPL CALC.SUM OF ELEC: 294 MOSM/KG (ref 275–295)
POTASSIUM SERPL-SCNC: 3.9 MMOL/L (ref 3.5–5.1)
PROT SERPL-MCNC: 6.7 G/DL (ref 5.7–8.2)
SODIUM SERPL-SCNC: 142 MMOL/L (ref 136–145)
URATE SERPL-MCNC: 3.1 MG/DL

## 2025-04-08 PROCEDURE — 80053 COMPREHEN METABOLIC PANEL: CPT

## 2025-04-08 PROCEDURE — 36415 COLL VENOUS BLD VENIPUNCTURE: CPT

## 2025-04-08 PROCEDURE — 84550 ASSAY OF BLOOD/URIC ACID: CPT

## 2025-04-08 PROCEDURE — 82550 ASSAY OF CK (CPK): CPT

## 2025-04-10 ENCOUNTER — OFFICE VISIT (OUTPATIENT)
Facility: LOCATION | Age: 70
End: 2025-04-10
Payer: MEDICARE

## 2025-04-10 VITALS
TEMPERATURE: 98 F | RESPIRATION RATE: 18 BRPM | HEART RATE: 68 BPM | OXYGEN SATURATION: 97 % | SYSTOLIC BLOOD PRESSURE: 135 MMHG | DIASTOLIC BLOOD PRESSURE: 85 MMHG

## 2025-04-10 DIAGNOSIS — Z51.89 ENCOUNTER FOR WOUND RE-CHECK: Primary | ICD-10-CM

## 2025-04-10 PROCEDURE — 99212 OFFICE O/P EST SF 10 MIN: CPT | Performed by: STUDENT IN AN ORGANIZED HEALTH CARE EDUCATION/TRAINING PROGRAM

## 2025-04-10 RX ORDER — ALLOPURINOL 100 MG/1
100 TABLET ORAL 4 TIMES DAILY
COMMUNITY
Start: 2025-03-31 | End: 2025-04-11

## 2025-04-10 NOTE — PROGRESS NOTES
Follow Up Visit Note       Active Problems      1. Encounter for wound re-check          Chief Complaint   Chief Complaint   Patient presents with    [facility]     EP- 4 week f/u back I&D. Pt is doing better. Pt denies any drainage or pain. Pt reports some itching. Pt denies any other symptoms.          History of Present Illness  69 year old female presents for for evaluation of a back epidermoid cyst. She denies any drainage or pain.     Imaging/Lab Results    Allergies  Katelyn is allergic to codeine, monosodium glutamate, monosodium glutamate, statins, sulfa antibiotics, atorvastatin, fentanyl, hydrocodone, hydrocodone-acetaminophen, jardiance [empagliflozin], augmentin [amoxicillin-pot clavulanate], ezetimibe, propoxyphene, and propoxyphene n-apap.    Past Medical / Surgical / Social / Family History    The past medical and past surgical history have been reviewed by me today.    Past Medical History[1]  Past Surgical History[2]    The family history and social history have been reviewed by me today.    Family History[3]  Social Hx on file[4]   Medications - Current[5]     Review of Systems  The Review of Systems has been reviewed by me during today.  Review of Systems   Constitutional:  Negative for chills, diaphoresis, fatigue and fever.   HENT:  Negative for ear discharge, ear pain and sore throat.    Eyes:  Negative for pain and discharge.   Respiratory:  Negative for cough, chest tightness and shortness of breath.    Cardiovascular:  Negative for chest pain, palpitations and leg swelling.   Gastrointestinal:  Negative for abdominal distention, abdominal pain, blood in stool, constipation, diarrhea, nausea and vomiting.   Genitourinary:  Negative for dysuria, frequency, hematuria and urgency.   Skin:  Negative for color change, pallor and rash.   Neurological:  Negative for weakness, light-headedness, numbness and headaches.   Hematological:  Negative for adenopathy. Does not bruise/bleed easily.    Psychiatric/Behavioral:  Negative for agitation and confusion.         Physical Findings   /85 (BP Location: Left arm, Patient Position: Sitting, Cuff Size: adult)   Pulse 68   Temp 97.8 °F (36.6 °C) (Temporal)   Resp 18   LMP  (LMP Unknown)   SpO2 97%   Physical Exam  Constitutional:       Appearance: Normal appearance.   HENT:      Head: Normocephalic and atraumatic.   Cardiovascular:      Pulses: Normal pulses.   Pulmonary:      Effort: Pulmonary effort is normal.   Skin:     General: Skin is warm.      Capillary Refill: Capillary refill takes less than 2 seconds.      Comments: There is a well healed incision in the mid back without any signs of residual cyst. No signs of infection or drainage   Neurological:      Mental Status: She is alert and oriented to person, place, and time. Mental status is at baseline.              Assessment/Plan  1. Encounter for wound re-check        Katelyn Gutierrez is a 69 year old female referred   for evaluation of infected back cyst.  On exam there is no residual cyst  on exam  No further intervention required at this time. She will monitor the area and follow up if she notes a cyst recur in the area.         No orders of the defined types were placed in this encounter.      Imaging & Referrals   None    Follow Up  No follow-ups on file.    Quique Garcia MD         [1]   Past Medical History:   Abnormal pelvic exam    ALCOHOL USE    Seasonal Famly dinners    Amenorrhea    Menopause    Contact allergic reaction    Poisin ivy, poison oak    Coronary atherosclerosis    Diabetes (HCC)    Fuchs' corneal dystrophy    Genital herpes simplex    High blood pressure    High cholesterol    Hyperlipidemia    Hypertension    Lyme disease    Obesity    OSTEOARTHRITIS    Osteoarthritis    Bad After covid    Pneumonia due to organism    Infant, almost     Post-operative nausea and vomiting    pt states she often wakes up during surgery    PTSD (post-traumatic stress  disorder)        Rotator cuff injury   [2]   Past Surgical History:  Procedure Laterality Date    Benign biopsy left Left 1993    Breast biopsy      Carpal tunnel release      Cataract  2015    Fuchs dystrophy    Cath drug eluting stent      Eye surgery      Foot surgery      Hand/finger surgery unlisted Right 10/19/20--Dr. Paulo Shay    ring finger A1 pulley release/trigger finger release    Insert intrauterine device      Jose localization wire 1 site left (cpt=19281)       bn      1979    Home birth    Other      Other accessory      dissected LAD, double stented 23    Other surgical history      A RCR RIGHT SHOULDER     Other surgical history      TRIGGER RELEASE RIGHT THUMB    Other surgical history      CTR BILATERAL      Other surgical history      NEUROMA REMOVED FOOT     Remove intrauterine device      Removed within a month of insertion due to pain and discomfo    Repair rotator cuff,acute      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; w/ stent< tissue valve  2023    Disection of LAD 2 stents   [3]   Family History  Problem Relation Age of Onset    Heart Disorder Father         Angioplasty age 54, open heart sugies, muliple stents, leaky heart valve    Heart Attack Father           age 80.  First heart incident age 56.    Migraines Father         chronic when alive    Heart Disease Father     Lipids Father         Leaky heart valve - kidney disease    Depression Sister     Kidney Disease Sister     Carotid stenosis Sister     Heart Disease Sister         Aorta valve leakage    Lipids Sister     Heart Disorder Sister         Leaky heart valve to be replaced soon, corotid artery sugery    Obesity Sister         Bulimia    Heart Disorder Maternal Grandfather          of heart attack in his early 60s    Heart Attack Maternal Grandfather         Heart attack - cause of death    Heart Disease Maternal Grandfather     Heart  Disorder Paternal Grandfather          of heart attack in his mid 60s    Heart Attack Paternal Grandfather             Heart Disease Paternal Grandfather     Breast Cancer Paternal Aunt 70        Found in her late 70s-80s and had no trace after treatment.    Dementia Mother         Alcohol induced dementia    Alcohol and Other Disorders Associated Mother     Heart Attack Mother          2020.  Stent in heart .    Heart Disease Mother     Anemia Mother         Temporary    Heart Attack Maternal Grandmother         .    Heart Disease Maternal Grandmother     Stroke Paternal Grandmother         minor stroke,     Tremor Paternal Grandmother         Parkinson's Disease    Genetic Disease Paternal Grandmother         Parkinson’s disease    Blood Cancer Niece         Survived Childhood leukemia    Genetic Disease Daughter         Fuchs Dystrophy, Rheumatoid Arthritis    Asthma Daughter     Asthma Daughter     Bleeding Disorders Neg     Colon Cancer Neg     Ovarian Cancer Neg    [4]   Social History  Socioeconomic History    Marital status: Life Partner    Number of children: 1    Years of education: 17   Occupational History    Occupation: business      Comment: [employer]    Occupation: unemployed now   Tobacco Use    Smoking status: Former     Current packs/day: 0.00     Average packs/day: 1 pack/day for 2.0 years (2.0 ttl pk-yrs)     Types: Cigarettes     Start date: 1972     Quit date: 1974     Years since quittin.9    Smokeless tobacco: Never    Tobacco comments:     Started slowly at age 18 and quit at age 20.   Vaping Use    Vaping status: Never Used   Substance and Sexual Activity    Alcohol use: Not Currently     Comment: 1 glass of wine at holiday dinner    Drug use: Never    Sexual activity: Yes     Partners: Female, Male     Comment: patient has one child    Other Topics Concern    Caffeine Concern No    Stress Concern  No    Weight Concern Yes    Special Diet Yes     Comment: Low purine diet Wj 3, 2025    Exercise Yes     Comment: Spine dosorder, Inflammation & shortnees of breath    Seat Belt No    Reaction to local anesthetic No     Comment: Heart palpatations at Dentist    Pt has a pacemaker No    Pt has a defibrillator No   [5]   Current Outpatient Medications:     allopurinol 100 MG Oral Tab, Take 1 tablet (100 mg total) by mouth in the morning, at noon, in the evening, and at bedtime., Disp: , Rfl:     colchicine 0.6 MG Oral Tab, Take 1 tablet (0.6 mg total) by mouth daily., Disp: 90 tablet, Rfl: 1    L-methylfolate 15 MG Oral Tab, Take 1 tablet (15 mg total) by mouth daily., Disp: 90 tablet, Rfl: 3    Evolocumab (REPATHA SURECLICK) 140 MG/ML Subcutaneous Solution Auto-injector, Inject into the skin Every 2 (two) weeks., Disp: , Rfl:     losartan 25 MG Oral Tab, Take 1 tablet (25 mg total) by mouth in the morning., Disp: , Rfl:     aspirin 81 MG Oral Tab EC, , Disp: , Rfl:     prasugrel 10 MG Oral Tab, Take 1 tablet (10 mg total) by mouth daily., Disp: 90 tablet, Rfl: 3    loteprednol 0.5 % Ophthalmic Suspension, Place 1 drop into both eyes in the morning. 1% per pt., Disp: , Rfl:     acetaminophen 500 MG Oral Tab, Take 1 tablet (500 mg total) by mouth every 6 (six) hours as needed for Pain., Disp: , Rfl:

## 2025-04-11 ENCOUNTER — OFFICE VISIT (OUTPATIENT)
Dept: RHEUMATOLOGY | Facility: CLINIC | Age: 70
End: 2025-04-11
Payer: MEDICARE

## 2025-04-11 VITALS
WEIGHT: 180 LBS | RESPIRATION RATE: 16 BRPM | HEART RATE: 64 BPM | BODY MASS INDEX: 31.89 KG/M2 | OXYGEN SATURATION: 96 % | DIASTOLIC BLOOD PRESSURE: 66 MMHG | HEIGHT: 63 IN | SYSTOLIC BLOOD PRESSURE: 120 MMHG | TEMPERATURE: 97 F

## 2025-04-11 DIAGNOSIS — M1A.9XX1 TOPHUS OF RIGHT HAND DUE TO GOUT: Primary | ICD-10-CM

## 2025-04-11 DIAGNOSIS — Z51.81 THERAPEUTIC DRUG MONITORING: ICD-10-CM

## 2025-04-11 DIAGNOSIS — M10.9 GOUT, UNSPECIFIED CAUSE, UNSPECIFIED CHRONICITY, UNSPECIFIED SITE: ICD-10-CM

## 2025-04-11 PROCEDURE — G2211 COMPLEX E/M VISIT ADD ON: HCPCS | Performed by: INTERNAL MEDICINE

## 2025-04-11 PROCEDURE — 99214 OFFICE O/P EST MOD 30 MIN: CPT | Performed by: INTERNAL MEDICINE

## 2025-04-11 RX ORDER — COLCHICINE 0.6 MG/1
0.6 TABLET ORAL DAILY
Qty: 90 TABLET | Refills: 1 | Status: SHIPPED | OUTPATIENT
Start: 2025-04-11

## 2025-04-11 RX ORDER — ALLOPURINOL 100 MG/1
400 TABLET ORAL DAILY
Qty: 360 TABLET | Refills: 1 | Status: SHIPPED | OUTPATIENT
Start: 2025-04-11

## 2025-04-11 NOTE — PATIENT INSTRUCTIONS
Continue allopurinol 400 mg daily for now.     Repeat blood work in late August 2025    Colchicine (to prevent gout flares while starting on allopurinol). Generally, you will need to be on colchicine for 6-12 months, when the risk of gout flares is highest during allopurinol initiation.  Colchicine 0.6 mg tab: take 1 tab daily.     Prednisone taper given (only as needed for gout flares):  take this today. Patient cannot tolerate high doses of prednisone  THEN 3 tabs (15 mg) daily for 3 day  THEN 2 tabs (10 mg ) daily for 3 days  THEN 1 tablet (5 mg total) daily for 3 days.
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
## 2025-04-12 NOTE — PROGRESS NOTES
The following individual(s) verbally consented to be recorded using ambient AI listening technology and understand that they can each withdraw their consent to this listening technology at any point by asking the clinician to turn off or pause the recording:    Patient name: Katelyn Mendoza Brenda  Additional names:

## 2025-04-25 ENCOUNTER — APPOINTMENT (OUTPATIENT)
Dept: GENERAL RADIOLOGY | Facility: HOSPITAL | Age: 70
End: 2025-04-25
Payer: MEDICARE

## 2025-04-25 ENCOUNTER — HOSPITAL ENCOUNTER (EMERGENCY)
Facility: HOSPITAL | Age: 70
Discharge: HOME | End: 2025-04-25
Attending: EMERGENCY MEDICINE
Payer: MEDICARE

## 2025-04-25 VITALS
RESPIRATION RATE: 16 BRPM | WEIGHT: 180 LBS | TEMPERATURE: 99 F | SYSTOLIC BLOOD PRESSURE: 125 MMHG | BODY MASS INDEX: 31.89 KG/M2 | DIASTOLIC BLOOD PRESSURE: 68 MMHG | HEIGHT: 63 IN | HEART RATE: 67 BPM | OXYGEN SATURATION: 100 %

## 2025-04-25 DIAGNOSIS — S80.00XA CONTUSION OF KNEE, UNSPECIFIED LATERALITY, INITIAL ENCOUNTER: Primary | ICD-10-CM

## 2025-04-25 PROCEDURE — 73560 X-RAY EXAM OF KNEE 1 OR 2: CPT | Performed by: EMERGENCY MEDICINE

## 2025-04-25 PROCEDURE — 99283 EMERGENCY DEPT VISIT LOW MDM: CPT

## 2025-04-25 RX ORDER — ACETAMINOPHEN 500 MG
1000 TABLET ORAL ONCE
Status: COMPLETED | OUTPATIENT
Start: 2025-04-25 | End: 2025-04-25

## 2025-04-25 NOTE — ED INITIAL ASSESSMENT (HPI)
Patient reports falling yesterday and landing on her knees. States she was babysitting a dog and fell over the dog, landing on a metal strip in the doorway. Today noticed that both knees are swollen, states left knee is more swollen. Able to ambulate, but has pain.   Denies hitting head. Takes Effient and baby Aspirin for history of dissected LAD with a stent.

## 2025-04-25 NOTE — ED PROVIDER NOTES
Patient Seen in: [facility] Emergency Department      History     Chief Complaint   Patient presents with    Knee Pain     Stated Complaint: fell yesterday, injured L knee, also feels light-headed    Subjective:   HPI  69-year-old woman here for evaluation of bilateral knee pain.  States she tripped over her small dog yesterday fell down onto her bilateral Duly knees, did not strike her head or sustain any other injuries is able to ambulate.    History of Present Illness               Objective:     Past Medical History:    Abnormal pelvic exam    ALCOHOL USE    Seasonal Famly dinners    Amenorrhea    Menopause    Contact allergic reaction    Lyudmilain ivy, poison oak    Coronary atherosclerosis    Diabetes (HCC)    Fuchs' corneal dystrophy    Genital herpes simplex    High blood pressure    High cholesterol    Hyperlipidemia    Hypertension    Lyme disease    Obesity    OSTEOARTHRITIS    Osteoarthritis    Bad After covid    Pneumonia due to organism    Infant, almost     Post-operative nausea and vomiting    pt states she often wakes up during surgery    PTSD (post-traumatic stress disorder)        Rotator cuff injury              Past Surgical History:   Procedure Laterality Date    Benign biopsy left Left     Breast biopsy      Carpal tunnel release      Cataract  2015    Fuchs dystrophy    Cath drug eluting stent      Eye surgery      Foot surgery      Hand/finger surgery unlisted Right 10/19/20--Dr. Paulo Shay    ring finger A1 pulley release/trigger finger release    Insert intrauterine device      [facility] localization wire 1 site left (cpt=19281)       bn      1979    Home birth    Other      Other accessory      dissected LAD, double stented 23    Other surgical history      A RCR RIGHT SHOULDER     Other surgical history      TRIGGER RELEASE RIGHT THUMB    Other surgical history      CTR BILATERAL      Other surgical history      NEUROMA REMOVED FOOT     Remove  intrauterine device  1977    Removed within a month of insertion due to pain and discomfo    Repair rotator cuff,acute      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; w/ stent< tissue valve  2023    Disection of LAD 2 stents                Social History     Socioeconomic History    Marital status: Life Partner    Number of children: 1    Years of education: 17   Occupational History    Occupation: business      Comment: [employer]    Occupation: unemployed now   Tobacco Use    Smoking status: Former     Current packs/day: 0.00     Average packs/day: 1 pack/day for 2.0 years (2.0 ttl pk-yrs)     Types: Cigarettes     Start date: 1972     Quit date: 1974     Years since quittin.9    Smokeless tobacco: Never    Tobacco comments:     Started slowly at age 18 and quit at age 20.   Vaping Use    Vaping status: Never Used   Substance and Sexual Activity    Alcohol use: Not Currently     Comment: 1 glass of wine at holiday dinner    Drug use: Never    Sexual activity: Yes     Partners: Female, Male     Comment: patient has one child    Other Topics Concern    Caffeine Concern No    Stress Concern No    Weight Concern Yes    Special Diet Yes     Comment: Low purine diet Jw 3, 2025    Exercise Yes     Comment: Spine dosorder, Inflammation & shortnees of breath    Seat Belt No    Reaction to local anesthetic No     Comment: Heart palpatations at Dentist    Pt has a pacemaker No    Pt has a defibrillator No   Social History Narrative    ** Merged History Encounter **          Social Drivers of Health     Food Insecurity: No Food Insecurity (2025)    Received from [facility]    Food Insecurity     Currently or in the past 3 months, have you worried your food would run out before you had money to buy more?: No     In the past 12 months, have you run out of food or been unable to get more?: No   Transportation Needs: No  Transportation Needs (1/27/2025)    Received from [facility]    Transportation Needs     Currently or in the past 3 months, has lack of transportation kept you from medical appointments, getting food or medicine, or providing care to a family member?: No     Medical Transportation Needs?: No                                Physical Exam     ED Triage Vitals [04/25/25 1534]   BP 98/71   Pulse 79   Resp 18   Temp 98.6 °F (37 °C)   Temp src Temporal   SpO2 96 %   O2 Device None (Room air)       Current Vitals:   Vital Signs  BP: 133/70  Pulse: 67  Resp: 18  Temp: 98.6 °F (37 °C)  Temp src: Temporal  MAP (mmHg): 88    Oxygen Therapy  SpO2: 100 %  O2 Device: None (Room air)        Physical Exam  Vitals signs and nursing note reviewed.   General: Well-appearing woman sitting in the chair no acute distress  Head: Normocephalic and atraumatic.   Pulmonary:  Breathing comfortably, no respiratory distress.  Knees: Bilateral knees with mild effusion, skin is intact mild bruising anteriorly bilaterally.  Tenderness to palpation anteriorly.  No gross instability with valgus or varus stress anterior and posterior drawer test negative bilaterally, compartments of bilateral legs are soft.  Neurological: Awake alert, speech is normal  Physical Exam                ED Course   Labs Reviewed - No data to display       Results            XR KNEE (1 OR 2 VIEWS), RIGHT (CPT=73560)  Result Date: 4/25/2025  PROCEDURE:  XR KNEE (1 OR 2 VIEWS), RIGHT (CPT=73560)  COMPARISON:  [facility] , XR, XR KNEE (1 OR 2 VIEWS), LEFT (CPT=73560), 4/25/2025, 3:54 PM.  INDICATIONS:  Knee pain  PATIENT STATED HISTORY: (As transcribed by Technologist)  Patient states she tripped over dog and fell onto both knees in the kitchen this morning. Patient complains of generalized bilateral anterior knee pain with swelling.   FINDINGS:  No evidence of acute displaced fracture or dislocation.  Normal mineralization.  Scattered vascular calcifications.   Minimal osteoarthritic changes in the patellofemoral compartment.            CONCLUSION:  No evidence of acute displaced fracture or dislocation in the right knee.  LOCATION:  [id number]   Dictated by (CST): Meet Zeng MD on 4/25/2025 at 4:12 PM     Finalized by (CST): Meet Zeng MD on 4/25/2025 at 4:12 PM       XR KNEE (1 OR 2 VIEWS), LEFT (CPT=73560)  Result Date: 4/25/2025  PROCEDURE:  XR KNEE (1 OR 2 VIEWS), LEFT (CPT=73560)  COMPARISON:  [facility] , XR, XR KNEE (1 OR 2 VIEWS), RIGHT (CPT=73560), 4/25/2025, 3:58 PM.  INDICATIONS:  fell yesterday, injured L knee, also feels light-headed, knee pain  PATIENT STATED HISTORY: (As transcribed by Technologist)  Patient states she tripped over dog and fell onto both knees in the kitchen this morning. Patient complains of generalized bilateral anterior knee pain with swelling.   FINDINGS:  No evidence of acute displaced fracture or dislocation.  Normal mineralization.  Unremarkable soft tissues.  Minimal osteoarthritic changes in the patellofemoral compartment.            CONCLUSION:  No evidence of acute displaced fracture or dislocation in the left knee.  LOCATION:  [id number]   Dictated by ([facility]): Meet Zeng MD on 4/25/2025 at 4:11 PM     Finalized by (CST): Meet Zeng MD on 4/25/2025 at 4:12 PM                            MDM   69-year-old woman here for evaluation after fall down onto bilateral knees, differential includes contusion versus fracture, x-rays of bilateral knees show no acute fracture no gross instability on exam x-rays of the bilateral knees are negative for fracture.  Believe likely contusions DC home with continued supportive care follow-up with PMD return precautions discussed patient is in agreement with plan        Medical Decision Making      Disposition and Plan     Clinical Impression:  1. Contusion of knee, unspecified laterality, initial encounter         Disposition:  Discharge  4/25/2025  5:08 pm    Follow-up:  Kenia Lamar,  MD  [address] [place] [id number]  [phone]    Follow up  Follow-up with your PMD for reevaluation in 24 to 48 hours.  Return to ER if symptoms worsen or change or if any other new concerns.          Medications Prescribed:  Current Discharge Medication List          Supplementary Documentation:

## 2025-05-05 ENCOUNTER — TELEPHONE (OUTPATIENT)
Dept: ORTHOPEDICS CLINIC | Facility: CLINIC | Age: 70
End: 2025-05-05

## 2025-05-05 DIAGNOSIS — M25.562 LEFT KNEE PAIN, UNSPECIFIED CHRONICITY: Primary | ICD-10-CM

## 2025-05-05 DIAGNOSIS — Z01.89 ENCOUNTER FOR LOWER EXTREMITY COMPARISON IMAGING STUDY: ICD-10-CM

## 2025-05-05 NOTE — TELEPHONE ENCOUNTER
Patient is seeing Dr. Shelton for left knee pain (patient fell). Please advise if imaging is needed.   Future Appointments   Date Time Provider Department Center   5/15/2025  9:40 AM Aruna Shelton MD [id number]   5/19/2025  2:30 PM Kenia Lamar MD [id number]   8/16/2025  9:40 AM [facility]   10/13/2025 12:15 PM [facility] [place]

## 2025-05-06 ENCOUNTER — PATIENT MESSAGE (OUTPATIENT)
Dept: RHEUMATOLOGY | Facility: CLINIC | Age: 70
End: 2025-05-06

## 2025-05-06 DIAGNOSIS — M10.9 GOUT, UNSPECIFIED CAUSE, UNSPECIFIED CHRONICITY, UNSPECIFIED SITE: Primary | ICD-10-CM

## 2025-05-06 DIAGNOSIS — M1A.9XX1 TOPHUS OF RIGHT HAND DUE TO GOUT: ICD-10-CM

## 2025-05-06 NOTE — TELEPHONE ENCOUNTER
Pt returning our call, she fell on 4/25 landed on her knees. Has some swelling and brusing. Denies any fluid on her knees. Went to ER and imaging was completed. Has a fu appt with Shanna on 5/15.   Patient feels she is experiencing a flare. Having all over joint pain. Taking allopurinol 400mg and colchicine once daily. She is wondering if prednisone might help help her.

## 2025-05-12 RX ORDER — ALLOPURINOL 300 MG/1
450 TABLET ORAL DAILY
Qty: 135 TABLET | Refills: 1 | Status: SHIPPED | OUTPATIENT
Start: 2025-05-12

## 2025-05-12 RX ORDER — ALLOPURINOL 200 MG/1
200 TABLET ORAL 2 TIMES DAILY
Qty: 180 TABLET | Refills: 0 | Status: CANCELLED
Start: 2025-05-12

## 2025-05-12 NOTE — TELEPHONE ENCOUNTER
Patient was asking about a 400 mg tablet to take instead of taking 4 tablets at bedtime. I pended the 200 mg BID.    Please sign if you are agreeable to this.

## 2025-05-15 ENCOUNTER — OFFICE VISIT (OUTPATIENT)
Dept: ORTHOPEDICS CLINIC | Facility: CLINIC | Age: 70
End: 2025-05-15
Payer: MEDICARE

## 2025-05-15 ENCOUNTER — HOSPITAL ENCOUNTER (OUTPATIENT)
Dept: GENERAL RADIOLOGY | Age: 70
Discharge: HOME | End: 2025-05-15
Attending: ORTHOPAEDIC SURGERY
Payer: MEDICARE

## 2025-05-15 VITALS — WEIGHT: 180 LBS | HEIGHT: 63 IN | BODY MASS INDEX: 31.89 KG/M2

## 2025-05-15 DIAGNOSIS — Z01.89 ENCOUNTER FOR LOWER EXTREMITY COMPARISON IMAGING STUDY: ICD-10-CM

## 2025-05-15 DIAGNOSIS — S80.11XA CONTUSION OF RIGHT KNEE AND LOWER LEG, INITIAL ENCOUNTER: ICD-10-CM

## 2025-05-15 DIAGNOSIS — S80.02XA CONTUSION OF LEFT KNEE AND LOWER LEG, INITIAL ENCOUNTER: Primary | ICD-10-CM

## 2025-05-15 DIAGNOSIS — S80.12XA CONTUSION OF LEFT KNEE AND LOWER LEG, INITIAL ENCOUNTER: Primary | ICD-10-CM

## 2025-05-15 DIAGNOSIS — M25.562 LEFT KNEE PAIN, UNSPECIFIED CHRONICITY: ICD-10-CM

## 2025-05-15 DIAGNOSIS — M17.0 PRIMARY OSTEOARTHRITIS OF BOTH KNEES: ICD-10-CM

## 2025-05-15 DIAGNOSIS — S80.01XA CONTUSION OF RIGHT KNEE AND LOWER LEG, INITIAL ENCOUNTER: ICD-10-CM

## 2025-05-15 PROCEDURE — 73564 X-RAY EXAM KNEE 4 OR MORE: CPT | Performed by: ORTHOPAEDIC SURGERY

## 2025-05-15 PROCEDURE — 99204 OFFICE O/P NEW MOD 45 MIN: CPT | Performed by: ORTHOPAEDIC SURGERY

## 2025-05-15 RX ORDER — PREDNISONE 5 MG/1
TABLET ORAL
COMMUNITY
Start: 2025-05-11

## 2025-05-15 NOTE — PROGRESS NOTES
[facility] New Consult    The following individual(s) verbally consented to be recorded using ambient AI listening technology and understand that they can each withdraw their consent to this listening technology at any point by asking the clinician to turn off or pause the recording:    Patient name: Katelyn Gutierrez    Chief Complaint   Patient presents with    Knee Pain     LEFT KNEE, DOI:04/24/25  -Patient sustained a fall       History of Present Illness  Katelyn Gutierrez is a 69 year old female with pervasive gout who presents with knee pain following a fall.    Three weeks ago, she tripped and fell while walking through a doorway, hitting her knees on a metal strip between two rooms. Since the fall, she has experienced significant pain in both knees, with the left knee being more painful and swollen. She describes a sensation of 'plastic crinkling' and popping sounds when bending the knee. The pain is particularly severe at the back of the knee and persists at night, affecting her sleep.    She has a history of pervasive gout and is currently on allopurinol and colchicine. Following the fall, she experienced a significant gout flare and resumed prednisone. The swelling in her knees was associated with numbness, particularly in the left leg, but this has improved.    She reports difficulty with stairs due to knee pain and lives in a split-level home, which requires frequent stair use. She manages stairs by leading with her right leg, which is less painful, and avoiding putting weight on the left leg. Despite the pain, she does not use a cane or walker and finds walking on flat surfaces manageable.    The pain in the left knee is primarily at the front and aches for hours after bending. No current numbness to light touch is reported, and she has good ankle motion.        Past Medical History[1]  Past Surgical History[2]  Current Medications[3]  Allergies[4]  Family  History[5]  Social History     Occupational History    Occupation: business      Comment: [employer]    Occupation: unemployed now   Tobacco Use    Smoking status: Former     Current packs/day: 0.00     Average packs/day: 1 pack/day for 2.0 years (2.0 ttl pk-yrs)     Types: Cigarettes     Start date: 1972     Quit date: 1974     Years since quittin.0    Smokeless tobacco: Never    Tobacco comments:     Started slowly at age 18 and quit at age 20.   Vaping Use    Vaping status: Never Used   Substance and Sexual Activity    Alcohol use: Not Currently     Comment: 1 glass of wine at holiday dinner    Drug use: Never    Sexual activity: Yes     Partners: Female, Male     Comment: patient has one child         ROS:  Complete ROS reviewed by me and non-contributory to the chief complaint except as mentioned above.    Physical Exam:    Ht 5' 3\" (1.6 m)   Wt 180 lb (81.6 kg)   LMP  (LMP Unknown)   BMI 31.89 kg/m²   Constitutional: Well developed, well nourished 69 year old female  Psychological: NAD, alert and appropriate  Respiratory: Breathing comfortably on room air with RR of 10-14  Cardiac: Palpable distal pulses with pink warm extremities distally  Physical Exam  MUSCULOSKELETAL: Fading ecchymosis on knees. No major effusions bilaterally. Full extension of knees. Excellent flexion of knees to 115 degrees. Apprehensive flexion of left knee. No medial or lateral collateral ligament tenderness in knees. No instability at collateral ligaments of knees. No numbness to light touch in knees. Good palpable pedal pulses. Good ankle motion. Pain in the front of left knee on flexion.      Imaging:     Multiple views right and left knees personally viewed, demonstrating moderate bilateral osteoarthritic changes with joint space narrowing, but no acute bony abnormalities.    XR KNEE, COMPLETE (4 OR MORE VIEWS), RIGHT (CPT=73564)  Result Date: 5/15/2025  CONCLUSION:  No evidence of  acute displaced fracture or dislocation in the right knee.  Degenerative changes as above.  LOCATION:  [facility]   Dictated by (CST): Meet Zeng MD on 5/15/2025 at 9:07 AM     Finalized by (CST): Meet Zeng MD on 5/15/2025 at 9:07 AM       XR KNEE, COMPLETE (4 OR MORE VIEWS), LEFT (CPT=73564)  Result Date: 5/15/2025  CONCLUSION:  No evidence of acute displaced fracture or dislocation in the left knee.  Degenerative changes as above.  LOCATION:  [facility]   Dictated by (CST): Meet Zeng MD on 5/15/2025 at 9:06 AM     Finalized by (CST): Meet Zeng MD on 5/15/2025 at 9:07 AM         Assessment/Diagnoses:  Diagnoses and all orders for this visit:    Contusion of left knee and lower leg, initial encounter    Contusion of right knee and lower leg, initial encounter    Primary osteoarthritis of both knees      Assessment & Plan  Knee pain due to arthritis and recent contusions  Chronic knee pain exacerbated by fall. Left knee pain under patella with arthritic changes on X-ray. Aggravated pre-existing arthritis with minor trauma. Pain during flexion, stair climbing, swelling, nocturnal aching, crepitus.  - Provided information on diclofenac gel for topical anti-inflammatory use.  - Advised to avoid kneeling, squatting, and minimize stair use.  - Encouraged proper stair technique: ascend with right knee, descend with left knee, use railing for support.    Gout  Pervasive gout managed with allopurinol and colchicine. Recent flare-up post-fall managed with prednisone, improvement noted.    Autoimmune attack  Recent autoimmune attack managed with prednisone, improvement noted.      Aruna Shelton MD, [facility]  Orthopaedic Surgery   Sports Medicine/Knee and Shoulder  [facility]  t: [phone]  f: [phone]           This document was partially prepared using Dragon Medical voice recognition software.  Although every attempt is made to correct errors during dictation,  discrepancies may still exist.        [1]   Past Medical History:   Abnormal pelvic exam    ALCOHOL USE    Seasonal Famly dinners    Amenorrhea    Menopause    Contact allergic reaction    Lyudmilain ivy, poison oak    Coronary atherosclerosis    Diabetes (HCC)    Fuchs' corneal dystrophy    Genital herpes simplex    High blood pressure    High cholesterol    Hyperlipidemia    Hypertension    Lyme disease    Obesity    OSTEOARTHRITIS    Osteoarthritis    Bad After covid    Pneumonia due to organism    Infant, almost     Post-operative nausea and vomiting    pt states she often wakes up during surgery    PTSD (post-traumatic stress disorder)        Rotator cuff injury   [2]   Past Surgical History:  Procedure Laterality Date    Benign biopsy left Left     Breast biopsy      Carpal tunnel release      Cataract  2015    Fuchs dystrophy    Cath drug eluting stent      Eye surgery      Foot surgery      Hand/finger surgery unlisted Right 10/19/20--Dr. Paulo Shay    ring finger A1 pulley release/trigger finger release    Insert intrauterine device      [facility] localization wire 1 site left (cpt=19281)       bn      1979    Home birth    Other      Other accessory      dissected LAD, double stented 23    Other surgical history      A RCR RIGHT SHOULDER     Other surgical history      TRIGGER RELEASE RIGHT THUMB    Other surgical history      CTR BILATERAL      Other surgical history      NEUROMA REMOVED FOOT     Remove intrauterine device      Removed within a month of insertion due to pain and discomfo    Repair rotator cuff,acute      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; w/ stent< tissue valve  2023    Disection of LAD 2 stents   [3]   Current Outpatient Medications   Medication Sig Dispense Refill    predniSONE 5 MG Oral Tab       allopurinol 300 MG Oral Tab Take 1.5 tablets (450 mg total) by mouth daily. 135 tablet 1    colchicine 0.6 MG Oral Tab  Take 1 tablet (0.6 mg total) by mouth daily. 90 tablet 1    L-methylfolate 15 MG Oral Tab Take 1 tablet (15 mg total) by mouth daily. 90 tablet 3    Evolocumab (REPATHA SURECLICK) 140 MG/ML Subcutaneous Solution Auto-injector Inject into the skin Every 2 (two) weeks.      aspirin 81 MG Oral Tab EC       prasugrel 10 MG Oral Tab Take 1 tablet (10 mg total) by mouth daily. 90 tablet 3    loteprednol 0.5 % Ophthalmic Suspension Place 1 drop into both eyes in the morning. 1% per pt.     [4]   Allergies  Allergen Reactions    Codeine UNKNOWN     Vomittingall pain medications  Vomittingall pain medications    Monosodium Glutamate SWELLING    Monosodium Glutamate SWELLING     swelling    Statins MYALGIA    Sulfa Antibiotics HIVES    Atorvastatin MYALGIA    Fentanyl NAUSEA AND VOMITING    Hydrocodone NAUSEA AND VOMITING    Hydrocodone-Acetaminophen NAUSEA AND VOMITING     vomittingall pain medications  vomittingall pain medications  vomittingall pain medications  vomittingall pain medications    Jardiance [Empagliflozin] DIZZINESS    Augmentin [Amoxicillin-Pot Clavulanate]      Myalgia  Ringing in ear    Ezetimibe NAUSEA ONLY    Propoxyphene UNKNOWN and NAUSEA ONLY     Vomittingall pain medications    Most pain medications cause nausea    Propoxyphene N-Apap NAUSEA ONLY     Most pain medications cause nausea   [5]   Family History  Problem Relation Age of Onset    Heart Disorder Father         Angioplasty age 54, open heart sugies, muliple stents, leaky heart valve    Heart Attack Father           age 80.  First heart incident age 56.    Migraines Father         chronic when alive    Heart Disease Father     Lipids Father         Leaky heart valve - kidney disease    Depression Sister     Kidney Disease Sister     Carotid stenosis Sister     Heart Disease Sister         Aorta valve leakage    Lipids Sister     Heart Disorder Sister         Leaky heart valve to be replaced soon, corotid artery sugery    Obesity  Sister         Bulimia    Heart Disorder Maternal Grandfather          of heart attack in his early 60s    Heart Attack Maternal Grandfather         Heart attack - cause of death    Heart Disease Maternal Grandfather     Heart Disorder Paternal Grandfather          of heart attack in his mid 60s    Heart Attack Paternal Grandfather             Heart Disease Paternal Grandfather     Breast Cancer Paternal Aunt 70        Found in her late 70s-80s and had no trace after treatment.    Dementia Mother         Alcohol induced dementia    Alcohol and Other Disorders Associated Mother     Heart Attack Mother          2020.  Stent in heart .    Heart Disease Mother     Anemia Mother         Temporary    Heart Attack Maternal Grandmother         .    Heart Disease Maternal Grandmother     Stroke Paternal Grandmother         minor stroke,     Tremor Paternal Grandmother         Parkinson's Disease    Genetic Disease Paternal Grandmother         Parkinson’s disease    Blood Cancer Niece         Survived Childhood leukemia    Genetic Disease Daughter         Fuchs Dystrophy, Rheumatoid Arthritis    Asthma Daughter     Asthma Daughter     Bleeding Disorders Neg     Colon Cancer Neg     Ovarian Cancer Neg

## 2025-05-19 ENCOUNTER — OFFICE VISIT (OUTPATIENT)
Dept: FAMILY MEDICINE CLINIC | Facility: CLINIC | Age: 70
End: 2025-05-19
Payer: MEDICARE

## 2025-05-19 VITALS
RESPIRATION RATE: 18 BRPM | HEART RATE: 68 BPM | DIASTOLIC BLOOD PRESSURE: 68 MMHG | TEMPERATURE: 97 F | BODY MASS INDEX: 32.89 KG/M2 | HEIGHT: 62.21 IN | WEIGHT: 181 LBS | SYSTOLIC BLOOD PRESSURE: 118 MMHG

## 2025-05-19 DIAGNOSIS — M48.062 SPINAL STENOSIS OF LUMBAR REGION WITH NEUROGENIC CLAUDICATION: ICD-10-CM

## 2025-05-19 DIAGNOSIS — Z12.31 VISIT FOR SCREENING MAMMOGRAM: ICD-10-CM

## 2025-05-19 DIAGNOSIS — Z12.11 SCREENING FOR COLON CANCER: ICD-10-CM

## 2025-05-19 DIAGNOSIS — Z00.00 ENCOUNTER FOR MEDICARE ANNUAL WELLNESS EXAM: Primary | ICD-10-CM

## 2025-05-19 DIAGNOSIS — I48.0 PAROXYSMAL ATRIAL FIBRILLATION (HCC): ICD-10-CM

## 2025-05-19 DIAGNOSIS — G47.00 INSOMNIA, UNSPECIFIED TYPE: ICD-10-CM

## 2025-05-19 NOTE — PROGRESS NOTES
Subjective:   Katelyn Gutierrez is a 69 year old female who presents for a Medicare Subsequent Annual Wellness visit (Pt already had Initial Annual Wellness) and scheduled follow up of multiple significant but stable problems.   History of Present Illness            Med/Surg/Allergy/Fam hx updates: see below  Home: lives with partner; split level home  Diet: gout diet  Exercise: going to improve soon  Sleep: see below  Periods: No LMP recorded (lmp unknown). Patient is postmenopausal.  Immunizations: waiting for now due to steroids   Screenings: due for mammogram in the summer    Changed rheumatologist to Dr. Singleton. Diagnosed with pervasive gout instead of PMR. On allopurinol and colchicine. Will get prednisone for flare up. Starting to feel normal again.     Was babysitting dog and fell, hurt her knees, had a lot of swelling, now has crepitus on the right and pain on the left. Saw ortho, Dr. Shelton.    Follows with neurology, Dr. Wilson. Got second opinions from neurosurgery.     Following with cardiology as well, Dr. Byrne.     Follows with Dr. Villalta, ophthalmology.     Saw weight loss clinic for nutritionist.     Saw integrative medicine. Was on supplements which helped.    Had spine injection which helped with pain.     Difficulty falling asleep at nighttime. Will try to go to bed at 11pm-12am, then will fall asleep around 4am. Will have coffee in the morning to help gout.  Now on gout diet. Tried anselmo tea. Has had less activity since 9/2025. Just joined gym.     Now off blood pressure medication due to cardiology.     Following with therapist. Patient worked with IT.    Saw eye doctor for bloody tears. Was told to not lift heavy things.     Helped get her partner's mother into nursing home. Tricky dealing with partner's father.     Some LUQ pain. Will have almond milk or cashew milk.     Wt Readings from Last 6 Encounters:   05/19/25 181 lb (82.1 kg)   05/15/25 180 lb (81.6 kg)   04/25/25 180 lb (81.6 kg)    04/11/25 180 lb (81.6 kg)   03/13/25 180 lb (81.6 kg)   03/13/25 193 lb (87.5 kg)     History/Other:   Fall Risk Assessment:   She has been screened for Falls and is High Risk. Fall Prevention information provided to patient in After Visit Summary.    Do you feel unsteady when standing or walking?: (Patient-Rptd) Yes  Do you worry about falling?: (Patient-Rptd) Yes  Have you fallen in the past year?: (Patient-Rptd) Yes  How many times have you fallen?: (Patient-Rptd) (P) 1  Were you injured?: (Patient-Rptd) (P) Yes     Cognitive Assessment:   She had a completely normal cognitive assessment - see flowsheet entries     Functional Ability/Status:   Katelyn Gutierrez has some abnormal functions as listed below:  She has Meal Preparation difficulties based on screening of functional status.She has Vision problems based on screening of functional status. She has Walking problems based on screening of functional status. She has problems with Daily Activities based on screening of functional status.     Depression Screening (PHQ):  PHQ-2 SCORE: 0  , done 5/19/2025     Advanced Directives:   She does NOT have a Living Will. [ ]  She does NOT have a Power of  for Health Care. [ ]  Discussed Advance Care Planning with patient (and family/surrogate if present). Standard forms made available to patient in After Visit Summary.      Patient Active Problem List   Diagnosis    PTSD (post-traumatic stress disorder)    Aortic valve sclerosis    Vertigo    Hypercholesterolemia    Osteopenia    Hair loss    Trigger ring finger of right hand    History of thumb surgery    Allergic conjunctivitis of both eyes    Bilateral carotid artery stenosis    CAD (coronary artery disease)    Elevated C-reactive protein (CRP)    ESR raised    Fuchs' endothelial dystrophy    LUQ pain    Claudication    Obesity    PMR (polymyalgia rheumatica) (HCC)    Polyarthralgia    Secondary hypercoagulable state (HCC)    Tear film insufficiency     Tachycardia    Thoracic aorta atherosclerosis    SVT (supraventricular tachycardia) (HCC)    Other specified disorders of bone density and structure, unspecified site    General symptom    Dyspnea on exertion    Scoliosis of lumbar spine    Bicuspid aortic valve    Hypertension    Paroxysmal atrial fibrillation (HCC)    Syncope    Heart valve disease    Anticoagulant long-term use    Spinal stenosis of lumbar region with neurogenic claudication     Allergies:  She is allergic to codeine, monosodium glutamate, monosodium glutamate, statins, sulfa antibiotics, atorvastatin, fentanyl, hydrocodone, hydrocodone-acetaminophen, jardiance [empagliflozin], augmentin [amoxicillin-pot clavulanate], ezetimibe, propoxyphene, and propoxyphene n-apap.    Current Medications:  Outpatient Medications Marked as Taking for the 5/19/25 encounter (Office Visit) with Kenia Lamar MD   Medication Sig    Coenzyme Q10 200 MG Oral Cap coenzyme Q10 200 mg capsule, [RxNorm: 791738]    predniSONE 5 MG Oral Tab     allopurinol 300 MG Oral Tab Take 1.5 tablets (450 mg total) by mouth daily.    colchicine 0.6 MG Oral Tab Take 1 tablet (0.6 mg total) by mouth daily.    L-methylfolate 15 MG Oral Tab Take 1 tablet (15 mg total) by mouth daily.    Evolocumab (REPATHA SURECLICK) 140 MG/ML Subcutaneous Solution Auto-injector Inject into the skin Every 2 (two) weeks.    aspirin 81 MG Oral Tab EC     prasugrel 10 MG Oral Tab Take 1 tablet (10 mg total) by mouth daily.    loteprednol 0.5 % Ophthalmic Suspension Place 1 drop into both eyes in the morning. 1% per pt.     Medical History:  She  has a past medical history of Abnormal pelvic exam (09/10/2020), ALCOHOL USE (2001), Amenorrhea (2004), Contact allergic reaction (1968), Coronary atherosclerosis, Diabetes (HCC), Fuchs' corneal dystrophy, Genital herpes simplex (1975), Gout, High blood pressure, High cholesterol, Hyperlipidemia, Hypertension (08/12/2024), Lyme disease, Obesity (2021),  OSTEOARTHRITIS, Osteoarthritis (), Pneumonia due to organism (), Post-operative nausea and vomiting, PTSD (post-traumatic stress disorder), and Rotator cuff injury.  Surgical History:  She  has a past surgical history that includes other surgical history; other surgical history; other surgical history; other surgical history; repair rotator cuff,acute; carpal tunnel release; foot surgery; hussein localization wire 1 site left (cpt=19281); other (); Eye surgery; benign biopsy left (Left, ); hand/finger surgery unlisted (Right, 10/19/20--Dr. Paulo Shay); other accessory; cath drug eluting stent; Breast biopsy (); replacement prosthetic aortic valve w/ cardiopulmonary bypass; w/ stent< tissue valve (2023); cataract ();  (1979); insert intrauterine device (); and remove intrauterine device ().   Family History:  Her family history includes Alcohol and Other Disorders Associated in her mother; Anemia in her mother; Asthma in her daughter and daughter; Blood Cancer in her niece; Breast Cancer (age of onset: 70) in her paternal aunt; Carotid stenosis in her sister; Dementia in her mother; Depression in her sister; Genetic Disease in her daughter and paternal grandmother; Heart Attack in her father, maternal grandfather, maternal grandmother, mother, and paternal grandfather; Heart Disease in her father, maternal grandfather, maternal grandmother, mother, paternal grandfather, and sister; Heart Disorder in her father, maternal grandfather, paternal grandfather, and sister; Kidney Disease in her sister; Lipids in her father and sister; Migraines in her father; Obesity in her sister; Stroke in her paternal grandmother; Tremor in her paternal grandmother.  Social History:  She  reports that she quit smoking about 51 years ago. Her smoking use included cigarettes. She started smoking about 53 years ago. She has a 2 pack-year smoking history. She has never used smokeless tobacco. She  reports that she does not currently use alcohol. She reports that she does not use drugs.    Tobacco:  She smoked tobacco in the past but quit greater than 12 months ago.  Tobacco Use[1]     CAGE Alcohol Screen:   CAGE screening score of 0 on 5/5/2025, showing low risk of alcohol abuse.      Patient Care Team:  Kenia Lamar MD as PCP - General (Family Medicine)  Alexandria Hassan MD (BARIATRICS)  Paulo Drummond PT as Physical Therapist (Physical Therapy)    Review of Systems  GENERAL: feels well otherwise  SKIN: denies any unusual skin lesions  EYES: denies blurred vision or double vision  HEENT: denies nasal congestion, sinus pain or ST  LUNGS: denies shortness of breath with exertion  CARDIOVASCULAR: denies chest pain on exertion  GI: denies abdominal pain, denies heartburn  : denies dysuria, vaginal discharge or itching, no complaint of urinary incontinence   MUSCULOSKELETAL: denies back pain  NEURO: denies headaches  PSYCHE: denies depression or anxiety  HEMATOLOGIC: denies hx of anemia  ENDOCRINE: denies thyroid history  ALL/ASTHMA: denies hx of allergy or asthma    Objective:   Physical Exam  General Appearance:  Alert, cooperative, no distress, appears stated age   Head:  Normocephalic, without obvious abnormality, atraumatic   Eyes:  PERRL, conjunctiva/corneas clear, EOM's intact both eyes   Ears:  Normal TM's and external ear canals, both ears   Nose: Nares normal, septum midline,mucosa normal, no drainage   Throat: Lips, mucosa, and tongue normal; teeth and gums normal   Neck: Supple, symmetrical, trachea midline, no adenopathy;  thyroid: not enlarged, symmetric, no tenderness/mass/nodules; no JVD   Back:   Symmetric, no curvature, ROM normal   Lungs:   Clear to auscultation bilaterally, respirations unlabored   Heart:  Regular rate and rhythm, S1 and S2 normal, no murmur, rub, or gallop   Abdomen:   Soft, non-tender, bowel sounds active all four quadrants,  no masses, no organomegaly   Pelvic:  Deferred   Extremities: Extremities normal, atraumatic, no cyanosis or edema   Pulses: 2+ and symmetric   Skin: Skin color, texture, turgor normal, no rashes or lesions   Lymph nodes: Cervical nodes normal   Neurologic: Normal     /68   Pulse 68   Temp 97.3 °F (36.3 °C) (Temporal)   Resp 18   Ht 5' 2.21\" (1.58 m)   Wt 181 lb (82.1 kg)   LMP  (LMP Unknown)   BMI 32.89 kg/m²  Estimated body mass index is 32.89 kg/m² as calculated from the following:    Height as of this encounter: 5' 2.21\" (1.58 m).    Weight as of this encounter: 181 lb (82.1 kg).    Medicare Hearing Assessment:   Hearing Screening    Time taken: 5/19/2025  2:48 PM  Entry User: Lorene Rouse MA  Screening Method: Finger Rub               Assessment & Plan:   Katelyn Gutierrez is a 69 year old female who presents for a Medicare Assessment.     1. Encounter for Medicare annual wellness exam (Primary)  2. Visit for screening mammogram  -     3D Mammogram Digital Screen, Bilateral (CPT=77067/50462); Future; Expected date: 05/19/2025  3. Screening for colon cancer  -     Occult Blood, Fecal, FIT Immunoassay; Future; Expected date: 05/19/2025  4. Insomnia, unspecified type  5. Spinal stenosis of lumbar region with neurogenic claudication  Assessment & Plan:         6. Paroxysmal atrial fibrillation (HCC)    Assessment & Plan  Encounter for Medicare annual wellness exam         Visit for screening mammogram    Orders:    3D Mammogram Digital Screen, Bilateral (CPT=77067/93332); Future    Screening for colon cancer    Orders:    Occult Blood, Fecal, FIT Immunoassay ([facility]); Future    Insomnia, unspecified type  -limit caffeine sources   -sleep hygiene including limiting screen time, making the room dark, keeping the room cool, not eating right before bed, reserving the bed for sleep        Spinal stenosis of lumbar region with neurogenic claudication  Following with specialists, appreciate evaluation and recommendations        Paroxysmal atrial fibrillation (HCC)  Following with cardiology, appreciate evaluation and recommendations                   The patient indicates understanding of these issues and agrees to the plan.        Return in 1 year (on 5/19/2026) for Medicare annual wellness visit or sooner if needed.     Kenia Lamar MD, 5/19/2025     Supplementary Documentation:   General Health:  In the past six months, have you lost more than 10 pounds without trying?: (Patient-Rptd) 2 - No  Type of Diet: (Patient-Rptd) Other  How does the patient maintain a good energy level?: (Patient-Rptd) Other  How would you describe your daily physical activity?: (Patient-Rptd) Moderate  How would you describe your current health state?: (Patient-Rptd) Fair  How do you maintain positive mental well-being?: (Patient-Rptd) Puzzles, Visiting Friends, Visiting Family  On a scale of 0 to 10, with 0 being no pain and 10 being severe pain, what is your pain level?: (Patient-Rptd) 4 - (Moderate)  In the past six months, have you experienced urine leakage?: (Patient-Rptd) 1-Yes  At any time do you feel concerned for the safety/well-being of yourself and/or your children, in your home or elsewhere?: (Patient-Rptd) No  Have you had any immunizations at another office such as Influenza, Hepatitis B, Tetanus, or Pneumococcal?: (Patient-Rptd) Yes    Health Maintenance   Topic Date Due    Colorectal Cancer Screening  Never done    Zoster Vaccines (1 of 2) Never done    Annual Depression Screening  01/01/2025    COVID-19 Vaccine (7 - 2024-25 season) 04/11/2025    Annual Physical  05/29/2025    Mammogram  08/15/2025    Influenza Vaccine  Completed    DEXA Scan  Completed    Fall Risk Screening (Annual)  Completed    Pneumococcal Vaccine: 50+ Years  Completed    Meningococcal B Vaccine  Aged Out          [1]   Social History  Tobacco Use   Smoking Status Former    Current packs/day: 0.00    Average packs/day: 1 pack/day for 2.0 years (2.0 ttl pk-yrs)     Types: Cigarettes    Start date: 1972    Quit date: 1974    Years since quittin.0   Smokeless Tobacco Never   Tobacco Comments    Started slowly at age 18 and quit at age 20.

## 2025-05-19 NOTE — PATIENT INSTRUCTIONS
Information about healthcare power of     [email or web address]    [email or web address]         Advance Medical Directive  An advance medical directive is a form that lets you plan ahead for the care you’d want if you could no longer express your wishes. You can decide the medical treatment you’d want. You can name the person you’d wish to make healthcare decisions for you. Laws vary from state to state. If needed, it may help to talk with an .     Writing down your wishes  An advance directive is important whether you’re young or old. Injury or illness can strike at any age.  Decide what's important to you. Think about the kind of treatment you’d want, or not want.  Some states allow only one kind of advance directive. Some let you do a durable power of  (POA) for healthcare and a living will. Some states put both of these on the same form.    A durable power of  (POA) for healthcare   This form lets you name a person that you have chosen and trust to speak and make decisions on your behalf.  This person can decide on treatment for you only when you can’t speak for yourself. Be sure to discuss your wishes with this person from time to time, especially if your health situation changes.  You don't need to be at the end of your life. This person can speak for you if you are not awake or aware, but likely to recover.    A living will  This form lets you list the care you want at the end of your life.  A living will applies only if you won’t live without medical treatment. It would apply if you have advanced cancer, a severe stroke, or other serious illness from which you won't recover.  It takes effect only when you can no longer express your wishes yourself.    [employer] last reviewed this educational content on 3/1/2023  ©  8206-2341 [employer]. All rights reserved. This information is not intended as a substitute for professional medical care. Always follow your healthcare professional's instructions.        An Agent’s Role for Durable Power of  for Health Care   It’s impossible to know which medical treatment choices you might face in the future. What if you aren't able to make these decisions for yourself? A durable power of  for healthcare lets you name an agent to speak and carry out your wishes on your behalf. This happens only if you can’t express your wishes yourself. An agent may also be known as a healthcare proxy, representative, or surrogate.     An agent’s duty  Your agent respects your wishes in the following ways:    Your agent’s duty is to see that your wishes are followed.  If your wishes aren’t known, your agent should try to decide what you want.  Your agent’s choices come before anyone else’s wishes for you.  A durable power of  for healthcare does not give your agent control over your money . Your agent also can’t be made to pay your bills.  Find out what your agent can do  Restrictions on what an agent can and can’t do vary by state. Check your state laws. In most states your agent can:   Choose or refuse life-sustaining and other medical treatment on your behalf  Consent to treatment, and then stop treatment if your condition doesn’t improve  Access and release your medical records  Request an autopsy and donate your organs, unless you’ve stated otherwise in your advance directive  Find out whether your state allows your agent to do the following:   Refuse or withdraw life-enhancing care  Refuse or stop tube feeding or other life-sustaining care--even if you haven’t stated on your advance directive that you don’t want these treatments  Order sterilization or   [employer] last reviewed this educational content on 2023 [employer]. All  rights reserved. This information is not intended as a substitute for professional medical care. Always follow your healthcare professional's instructions.             Refill policies:      Allow 3 business days for refills; controlled substances may take longer.  Contact your pharmacy at least 5-7 business days prior to running out of medication and have them send an electronic request or submit through the \"request refill\" option thru your [employer] account. No need to do both, as multiple requests will create an automated [employer] message to notify of a denial for one of the duplicated requests, causing you undue confusion.   Refills are NOT addressed on weekends; covering physicians do not authorize routine medications on weekends.  No narcotics or controlled substances are refilled after noon on Fridays or by on call physicians.  By law, narcotics cannot be faxed or phoned into your pharmacy.  If your prescription is due for a refill, you may be due for a follow up appointment. Please call our office at [phone] to make an appointment or schedule an appointment via [employer].  To best provide you care, patients receiving routine medications need to be seen at least twice a year. Patients receiving narcotic/controlled substance medications need to be seen at least once every 3 months.  In the event that your preferred pharmacy does not have the requested medication in stock (ie Backordered), it is your responsibility to find another pharmacy that has the requested medication available. We will gladly send a new prescription to that pharmacy at your request.  controlled substances may not be able to be filled out of state due to license restrictions.  If you have a planned trip, it's best to call your pharmacy at least 5-7 business days to prevent any delays in your medication refill.    Scheduling Tests:    If your physician has ordered radiology tests such as MRI or CT scans, please contact Central Scheduling at  [phone] right away to schedule the test.  Once scheduled, the [facility] Centralized Referral Team will work with your insurance carrier to obtain pre-certification or prior authorization.  Depending on your insurance carrier, approval may take 3-10 days.  It is highly recommended patients assure they have received an authorization before having a test performed.  If test is done without insurance authorization, patient may be responsible for the entire amount billed.      Precertification and Prior Authorizations:  If your physician has recommended that you have a procedure or additional testing performed the [facility] Centralized Referral Team will contact your insurance carrier to obtain pre-certification or prior authorization.    You are strongly encouraged to contact your insurance carrier to verify that your procedure/test has been approved and is a COVERED benefit.  Although the [facility] Centralized Referral Team does its due diligence, the insurance carrier gives the disclaimer that \"Although the procedure is authorized, this does not guarantee payment.\"    Ultimately the patient is responsible for payment.   Thank you for your understanding in this matter.  Paperwork Completion:  If you require FMLA or disability paperwork for your recovery, please make sure to either drop it off or have it faxed to our office at [phone]. Be sure the form has your name and date of birth on it.  The form will be faxed to our Forms Department and they will complete it for you.  There is a 25$ fee for all forms that need to be filled out.  Please be aware there is a 10-14 day turnaround time.  You will need to sign a release of information (DESI) form if your paperwork does not come with one.  You may call the Forms Department with any questions at [phone].  Their fax number is [phone].

## 2025-05-30 DIAGNOSIS — Z51.81 THERAPEUTIC DRUG MONITORING: ICD-10-CM

## 2025-05-30 DIAGNOSIS — M10.9 GOUT, UNSPECIFIED CAUSE, UNSPECIFIED CHRONICITY, UNSPECIFIED SITE: ICD-10-CM

## 2025-05-30 DIAGNOSIS — M1A.9XX1 TOPHUS OF RIGHT HAND DUE TO GOUT: ICD-10-CM

## 2025-05-30 RX ORDER — COLCHICINE 0.6 MG/1
0.6 TABLET ORAL DAILY
Qty: 90 TABLET | Refills: 1 | Status: SHIPPED | OUTPATIENT
Start: 2025-05-30

## 2025-05-30 NOTE — TELEPHONE ENCOUNTER
Last office visit: 04/11/2025    Next Rheum Apt:10/13/2025 Claudia Singleton MD    Last fill: 05/27/2025    Labs: Uric Acid  Order: [id number]   Collected 4/8/2025 11:25 AM       Status: Final result       Dx: Gout, unspecified cause, unspecified ...    1 Result Note       1 Patient Communication      Component  Ref Range & Units (hover) 4/8/25 11:25 AM   Uric Acid 3.1   Resulting Agency [facility] ([facility])             Specimen Collected: 04/08/25 11:25 AM Last Resulted: 04/08/25  1:10 PM           Lab Results   Component Value Date    CREATSERUM 0.87 04/08/2025    ALKPHO 87 04/08/2025    AST 33 04/08/2025    ALT 30 04/08/2025    BILT 0.7 04/08/2025    TP 6.7 04/08/2025    ALB 4.3 04/08/2025       Lab Results   Component Value Date    WBC 7.0 03/13/2025    HGB 13.1 03/13/2025    .0 03/13/2025    NEPRELIM 4.50 03/13/2025    NEPERCENT 64.6 03/13/2025    LYPERCENT 20.8 03/13/2025    NE 4.50 03/13/2025    LYMABS 1.45 03/13/2025

## 2025-06-06 ENCOUNTER — TELEPHONE (OUTPATIENT)
Facility: CLINIC | Age: 70
End: 2025-06-06

## 2025-06-30 ENCOUNTER — OFFICE VISIT (OUTPATIENT)
Dept: FAMILY MEDICINE CLINIC | Facility: CLINIC | Age: 70
End: 2025-06-30
Payer: MEDICARE

## 2025-06-30 ENCOUNTER — TELEPHONE (OUTPATIENT)
Facility: CLINIC | Age: 70
End: 2025-06-30

## 2025-06-30 VITALS
OXYGEN SATURATION: 95 % | TEMPERATURE: 97 F | HEART RATE: 86 BPM | RESPIRATION RATE: 18 BRPM | SYSTOLIC BLOOD PRESSURE: 132 MMHG | DIASTOLIC BLOOD PRESSURE: 84 MMHG

## 2025-06-30 DIAGNOSIS — H65.92 FLUID LEVEL BEHIND TYMPANIC MEMBRANE OF LEFT EAR: Primary | ICD-10-CM

## 2025-06-30 DIAGNOSIS — S10.91XA ABRASION, NECK W/O INFECTION: ICD-10-CM

## 2025-06-30 DIAGNOSIS — R42 VERTIGO: ICD-10-CM

## 2025-06-30 PROCEDURE — 99213 OFFICE O/P EST LOW 20 MIN: CPT

## 2025-06-30 NOTE — TELEPHONE ENCOUNTER
Patient called office, feels she is experiencing a itchy, red rash to her neck area. Feels it might be due to the heat, she is often on her ipad. She is worried this could be due to her medication. Patient denies taking any new medication, and has been taking allopurinol and colchicine since January. She will try to attach a photo of her neck for further review.. Denies taking Benadryl to see if this relieves her symptoms.     Patient also having co vertigo with nausea. Explained she will have to go to  for evaluation and treatment.

## 2025-06-30 NOTE — PROGRESS NOTES
Subjective:   Patient ID: Katelyn Gutierrez is a 69 year old female.    Patient presents to clinic with concerns about feeling like there is fluid in ear and vertigo for 2 days. Reports that she has had vertigo in the past but it has been a while.    Reports that for about 2 months has felt like the skin and area below her left ear is swollen and itchy. No known exposures.    Ear Problem   There is pain in the left ear. This is a new problem. The current episode started yesterday. The problem has been unchanged. There has been no fever. Associated symptoms include vomiting. Pertinent negatives include no ear discharge.       History/Other:   Review of Systems   Constitutional:  Negative for fever.   HENT:  Negative for ear discharge.    Gastrointestinal:  Positive for vomiting.   Skin:         See HPI   Neurological:  Positive for dizziness.   All other systems reviewed and are negative.    Current Medications[1]  Allergies:Allergies[2]    Objective:   Physical Exam  Vitals reviewed.   Constitutional:       General: She is not in acute distress.     Appearance: Normal appearance. She is not ill-appearing or toxic-appearing.   HENT:      Head: Normocephalic and atraumatic.      Right Ear: Tympanic membrane, ear canal and external ear normal.      Left Ear: Ear canal and external ear normal. A middle ear effusion is present. Tympanic membrane is not erythematous, retracted or bulging.      Ears:      Comments: Left TM with clear fluid     Nose: Nose normal.      Mouth/Throat:      Mouth: Mucous membranes are moist.      Pharynx: Oropharynx is clear.   Cardiovascular:      Rate and Rhythm: Normal rate and regular rhythm.      Pulses: Normal pulses.      Heart sounds: Normal heart sounds.   Pulmonary:      Effort: Pulmonary effort is normal. No respiratory distress.      Breath sounds: Normal breath sounds. No wheezing, rhonchi or rales.   Musculoskeletal:         General: Normal range of motion.      Cervical back:  Normal range of motion and neck supple.   Skin:     General: Skin is warm and dry.      Capillary Refill: Capillary refill takes less than 2 seconds.      Findings: Abrasion present.      Comments: Small abrasion to left neck. No signs of infection.   Neurological:      General: No focal deficit present.      Mental Status: She is alert and oriented to person, place, and time.   Psychiatric:         Mood and Affect: Mood normal.         Behavior: Behavior normal.         Assessment & Plan:   1. Fluid level behind tympanic membrane of left ear    2. Vertigo    3. Abrasion, neck w/o infection        Discussion about exam findings and supportive treatment. Follow up with PCP if symptoms continue.       Meds This Visit:  Requested Prescriptions      No prescriptions requested or ordered in this encounter       Imaging & Referrals:  None         [1]   Current Outpatient Medications   Medication Sig Dispense Refill    colchicine 0.6 MG Oral Tab Take 1 tablet (0.6 mg total) by mouth daily. 90 tablet 1    Coenzyme Q10 200 MG Oral Cap coenzyme Q10 200 mg capsule, [RxNorm: 561240]      predniSONE 5 MG Oral Tab       allopurinol 300 MG Oral Tab Take 1.5 tablets (450 mg total) by mouth daily. 135 tablet 1    L-methylfolate 15 MG Oral Tab Take 1 tablet (15 mg total) by mouth daily. 90 tablet 3    Evolocumab (REPATHA SURECLICK) 140 MG/ML Subcutaneous Solution Auto-injector Inject into the skin Every 2 (two) weeks.      aspirin 81 MG Oral Tab EC       prasugrel 10 MG Oral Tab Take 1 tablet (10 mg total) by mouth daily. 90 tablet 3    loteprednol 0.5 % Ophthalmic Suspension Place 1 drop into both eyes in the morning. 1% per pt.     [2]   Allergies  Allergen Reactions    Codeine UNKNOWN     Vomittingall pain medications  Vomittingall pain medications    Monosodium Glutamate SWELLING    Monosodium Glutamate SWELLING     swelling    Statins MYALGIA    Sulfa Antibiotics HIVES    Atorvastatin MYALGIA    Fentanyl NAUSEA AND VOMITING     Hydrocodone NAUSEA AND VOMITING    Hydrocodone-Acetaminophen NAUSEA AND VOMITING     vomittingall pain medications  vomittingall pain medications  vomittingall pain medications  vomittingall pain medications    Jardiance [Empagliflozin] DIZZINESS    Augmentin [Amoxicillin-Pot Clavulanate]      Myalgia  Ringing in ear    Ezetimibe NAUSEA ONLY    Propoxyphene UNKNOWN and NAUSEA ONLY     Vomittingall pain medications    Most pain medications cause nausea    Propoxyphene N-Apap NAUSEA ONLY     Most pain medications cause nausea

## 2025-06-30 NOTE — TELEPHONE ENCOUNTER
Call pt back. Would skip allopurinol just in case this is a drug rash. Though it is probably due to some non-medication issue. Send me pic to review. Once rash resolves, we will restart allopurinol.

## 2025-06-30 NOTE — TELEPHONE ENCOUNTER
Called patient, explained Dr. Singleton is advising to skip allopurinol just in case this is a drug rash. Though it is probably due to some non-medication issue. Send me pic to review. Once rash resolves, we will restart allopurinol. Pt voiced understanding.

## 2025-07-01 ENCOUNTER — MED REC SCAN ONLY (OUTPATIENT)
Dept: FAMILY MEDICINE CLINIC | Facility: CLINIC | Age: 70
End: 2025-07-01

## 2025-08-12 ENCOUNTER — LAB ENCOUNTER (OUTPATIENT)
Dept: LAB | Age: 70
End: 2025-08-12
Attending: NURSE PRACTITIONER

## 2025-08-12 DIAGNOSIS — I25.10 CORONARY ATHEROSCLEROSIS OF NATIVE CORONARY ARTERY: Primary | ICD-10-CM

## 2025-08-12 DIAGNOSIS — I10 HTN (HYPERTENSION): ICD-10-CM

## 2025-08-12 LAB
ALT SERPL-CCNC: 20 U/L (ref 10–49)
AST SERPL-CCNC: 26 U/L (ref ?–34)
CHOLEST SERPL-MCNC: 185 MG/DL (ref ?–200)
FASTING PATIENT LIPID ANSWER: YES
HDLC SERPL-MCNC: 64 MG/DL (ref 40–59)
LDLC SERPL CALC-MCNC: 89 MG/DL (ref ?–100)
NONHDLC SERPL-MCNC: 121 MG/DL (ref ?–130)
TRIGL SERPL-MCNC: 189 MG/DL (ref 30–149)
VLDLC SERPL CALC-MCNC: 31 MG/DL (ref 0–30)

## 2025-08-12 PROCEDURE — 36415 COLL VENOUS BLD VENIPUNCTURE: CPT

## 2025-08-12 PROCEDURE — 84460 ALANINE AMINO (ALT) (SGPT): CPT

## 2025-08-12 PROCEDURE — 80061 LIPID PANEL: CPT

## 2025-08-12 PROCEDURE — 84450 TRANSFERASE (AST) (SGOT): CPT

## 2025-08-13 ENCOUNTER — OFFICE VISIT (OUTPATIENT)
Dept: FAMILY MEDICINE CLINIC | Facility: CLINIC | Age: 70
End: 2025-08-13

## 2025-08-13 VITALS
RESPIRATION RATE: 19 BRPM | WEIGHT: 180 LBS | TEMPERATURE: 98 F | BODY MASS INDEX: 31.89 KG/M2 | HEIGHT: 63 IN | HEART RATE: 83 BPM | OXYGEN SATURATION: 97 %

## 2025-08-13 DIAGNOSIS — T14.8XXA SUPERFICIAL BRUISING: Primary | ICD-10-CM

## 2025-08-13 PROCEDURE — 99213 OFFICE O/P EST LOW 20 MIN: CPT | Performed by: NURSE PRACTITIONER

## 2025-08-14 DIAGNOSIS — M10.9 GOUT, UNSPECIFIED CAUSE, UNSPECIFIED CHRONICITY, UNSPECIFIED SITE: Primary | ICD-10-CM

## 2025-08-14 DIAGNOSIS — M1A.9XX1 TOPHUS OF RIGHT HAND DUE TO GOUT: ICD-10-CM

## 2025-08-14 RX ORDER — ALLOPURINOL 300 MG/1
450 TABLET ORAL DAILY
Qty: 135 TABLET | Refills: 1 | Status: SHIPPED | OUTPATIENT
Start: 2025-08-14

## 2025-08-16 ENCOUNTER — HOSPITAL ENCOUNTER (OUTPATIENT)
Dept: MAMMOGRAPHY | Age: 70
Discharge: HOME | End: 2025-08-16
Attending: SURGERY

## 2025-08-16 DIAGNOSIS — N64.52 NIPPLE DISCHARGE: ICD-10-CM

## 2025-08-16 DIAGNOSIS — Z12.31 ENCOUNTER FOR SCREENING MAMMOGRAM FOR MALIGNANT NEOPLASM OF BREAST: ICD-10-CM

## 2025-08-16 PROCEDURE — 77067 SCR MAMMO BI INCL CAD: CPT | Performed by: SURGERY

## 2025-08-16 PROCEDURE — 77063 BREAST TOMOSYNTHESIS BI: CPT | Performed by: SURGERY

## 2025-08-17 ENCOUNTER — RESULTS FOLLOW-UP (OUTPATIENT)
Facility: CLINIC | Age: 70
End: 2025-08-17

## 2025-08-28 ENCOUNTER — MED REC SCAN ONLY (OUTPATIENT)
Dept: FAMILY MEDICINE CLINIC | Facility: CLINIC | Age: 70
End: 2025-08-28

## 2025-08-29 ENCOUNTER — LAB ENCOUNTER (OUTPATIENT)
Dept: LAB | Age: 70
End: 2025-08-29
Attending: INTERNAL MEDICINE

## 2025-08-29 DIAGNOSIS — Z51.81 THERAPEUTIC DRUG MONITORING: ICD-10-CM

## 2025-08-29 DIAGNOSIS — M1A.9XX1 TOPHUS OF RIGHT HAND DUE TO GOUT: ICD-10-CM

## 2025-08-29 DIAGNOSIS — M10.9 GOUT, UNSPECIFIED CAUSE, UNSPECIFIED CHRONICITY, UNSPECIFIED SITE: ICD-10-CM

## 2025-08-29 LAB
ALBUMIN SERPL-MCNC: 4.4 G/DL (ref 3.2–4.8)
ALBUMIN/GLOB SERPL: 1.8 (ref 1–2)
ALP LIVER SERPL-CCNC: 94 U/L (ref 55–142)
ALT SERPL-CCNC: 32 U/L (ref 10–49)
ANION GAP SERPL CALC-SCNC: 12 MMOL/L (ref 0–18)
AST SERPL-CCNC: 35 U/L (ref ?–34)
BASOPHILS # BLD AUTO: 0.03 X10(3) UL (ref 0–0.2)
BASOPHILS NFR BLD AUTO: 0.7 %
BILIRUB SERPL-MCNC: 0.7 MG/DL (ref 0.2–1.1)
BUN BLD-MCNC: 11 MG/DL (ref 9–23)
CALCIUM BLD-MCNC: 9.6 MG/DL (ref 8.7–10.6)
CHLORIDE SERPL-SCNC: 104 MMOL/L (ref 98–112)
CK SERPL-CCNC: 50 U/L (ref 34–145)
CO2 SERPL-SCNC: 27 MMOL/L (ref 21–32)
CREAT BLD-MCNC: 0.89 MG/DL (ref 0.55–1.02)
EGFRCR SERPLBLD CKD-EPI 2021: 69 ML/MIN/1.73M2 (ref 60–?)
EOSINOPHIL # BLD AUTO: 0.22 X10(3) UL (ref 0–0.7)
EOSINOPHIL NFR BLD AUTO: 5.2 %
ERYTHROCYTE [DISTWIDTH] IN BLOOD BY AUTOMATED COUNT: 13.7 %
FASTING STATUS PATIENT QL REPORTED: NO
GLOBULIN PLAS-MCNC: 2.4 G/DL (ref 2–3.5)
GLUCOSE BLD-MCNC: 87 MG/DL (ref 70–99)
HCT VFR BLD AUTO: 38.3 % (ref 35–48)
HGB BLD-MCNC: 12.7 G/DL (ref 12–16)
IMM GRANULOCYTES # BLD AUTO: 0 X10(3) UL (ref 0–1)
IMM GRANULOCYTES NFR BLD: 0 %
LYMPHOCYTES # BLD AUTO: 1.48 X10(3) UL (ref 1–4)
LYMPHOCYTES NFR BLD AUTO: 34.8 %
MCH RBC QN AUTO: 32.1 PG (ref 26–34)
MCHC RBC AUTO-ENTMCNC: 33.2 G/DL (ref 31–37)
MCV RBC AUTO: 96.7 FL (ref 80–100)
MONOCYTES # BLD AUTO: 0.48 X10(3) UL (ref 0.1–1)
MONOCYTES NFR BLD AUTO: 11.3 %
NEUTROPHILS # BLD AUTO: 2.04 X10 (3) UL (ref 1.5–7.7)
NEUTROPHILS # BLD AUTO: 2.04 X10(3) UL (ref 1.5–7.7)
NEUTROPHILS NFR BLD AUTO: 48 %
OSMOLALITY SERPL CALC.SUM OF ELEC: 295 MOSM/KG (ref 275–295)
PLATELET # BLD AUTO: 246 10(3)UL (ref 150–450)
POTASSIUM SERPL-SCNC: 3.7 MMOL/L (ref 3.5–5.1)
PROT SERPL-MCNC: 6.8 G/DL (ref 5.7–8.2)
RBC # BLD AUTO: 3.96 X10(6)UL (ref 3.8–5.3)
SODIUM SERPL-SCNC: 143 MMOL/L (ref 136–145)
URATE SERPL-MCNC: 2.2 MG/DL (ref 3.1–7.8)
WBC # BLD AUTO: 4.3 X10(3) UL (ref 4–11)

## 2025-08-29 PROCEDURE — 36415 COLL VENOUS BLD VENIPUNCTURE: CPT

## 2025-08-29 PROCEDURE — 85025 COMPLETE CBC W/AUTO DIFF WBC: CPT

## 2025-08-29 PROCEDURE — 80053 COMPREHEN METABOLIC PANEL: CPT

## 2025-08-29 PROCEDURE — 82550 ASSAY OF CK (CPK): CPT

## 2025-08-29 PROCEDURE — 84550 ASSAY OF BLOOD/URIC ACID: CPT

## (undated) NOTE — LETTER
WHERE IS YOUR PAIN NOW?  Taemra the areas on your body where you feel the described sensations.  Use the appropriate symbol.  Tamera the areas of radiation.  Include all affected areas.  Just to complete the picture, please draw in the face.     ACHE:  ^ ^ ^   NUMBNESS:  0000   PINS & NEEDLES:  = = = =                              ^ ^ ^                       0000              = = = =                                    ^ ^ ^                       0000            = = = =      BURNING:  XXXX   STABBING: ////                  XXXX                ////                         XXXX          ////     Please tamera the line below indicating your degree of pain right now  with 0 being no pain 10 being the worst pain possible.                                         0             1             2              3             4              5              6              7             8             9             10         Patient Signature:

## (undated) NOTE — Clinical Note
Thank you for allowing me to care for your patient! I have ordered her next mammo and copied you on results. I am not concerned about the nipple discharge at this time but she can see me in about 1 year or as needed, especially if anything changes with the discharge. I recommend maybe general surgery for the sebaceous cyst on her back - I'd be happy to remove it if it was on the chest! Thanks, Reji

## (undated) NOTE — LETTER
24        To Whom It May Concern:      Katelyn Gutierrez  :  1955    []  Patient has been cleared to hold Effient for 7 days prior to bilateral L5 transforaminal epidural steroid injections.  Holding the medication(s) may put the patient at an increased risk for stroke, heart attack, or neurologic or cardiac events.    []  Patient has NOT been cleared to hold Effient for procedure.        X_________________________________________  (Provider signature)                                     (Date)      Please make a selection, sign and fax this letter back to our office    If this office may be of further assistance, please do not hesitate to contact us.      Sincerely,    MARÍA MILLER MD    Phone #: [phone]  Fax #: [phone]

## (undated) NOTE — Clinical Note
Initial assessment completed with patient.  Pt confirms gen sx appts, as scheduled. Pt already spoke with your office today--no need for PCP appt, at this time. Patient has met goals, no further outreach needed. Thank you!  Future Appointments 3/3/2025   9:00 AM    [facility]            [facility]        [id number] 3/24/2025  10:00 AM   Pamela Forman MD       [facility]        [id number] 4/11/2025  11:45 AM   Claudia Singleton MD           [id number]        [facility] 4/25/2025  10:45 AM   Emily Aguilar PA-C      [facility]        [facility] 8/16/2025  9:40 AM    [facility]                [facility] NINO Osorio